# Patient Record
Sex: FEMALE | Race: WHITE | NOT HISPANIC OR LATINO | Employment: OTHER | URBAN - METROPOLITAN AREA
[De-identification: names, ages, dates, MRNs, and addresses within clinical notes are randomized per-mention and may not be internally consistent; named-entity substitution may affect disease eponyms.]

---

## 2021-05-25 ENCOUNTER — RECORDS - HEALTHEAST (OUTPATIENT)
Dept: ADMINISTRATIVE | Facility: CLINIC | Age: 86
End: 2021-05-25

## 2021-05-26 ENCOUNTER — RECORDS - HEALTHEAST (OUTPATIENT)
Dept: ADMINISTRATIVE | Facility: CLINIC | Age: 86
End: 2021-05-26

## 2021-05-27 ENCOUNTER — RECORDS - HEALTHEAST (OUTPATIENT)
Dept: ADMINISTRATIVE | Facility: CLINIC | Age: 86
End: 2021-05-27

## 2021-05-31 ENCOUNTER — RECORDS - HEALTHEAST (OUTPATIENT)
Dept: ADMINISTRATIVE | Facility: CLINIC | Age: 86
End: 2021-05-31

## 2021-06-01 ENCOUNTER — RECORDS - HEALTHEAST (OUTPATIENT)
Dept: ADMINISTRATIVE | Facility: CLINIC | Age: 86
End: 2021-06-01

## 2021-06-02 ENCOUNTER — RECORDS - HEALTHEAST (OUTPATIENT)
Dept: ADMINISTRATIVE | Facility: CLINIC | Age: 86
End: 2021-06-02

## 2021-07-13 ENCOUNTER — RECORDS - HEALTHEAST (OUTPATIENT)
Dept: ADMINISTRATIVE | Facility: CLINIC | Age: 86
End: 2021-07-13

## 2021-07-14 ENCOUNTER — RECORDS - HEALTHEAST (OUTPATIENT)
Dept: ADMINISTRATIVE | Facility: CLINIC | Age: 86
End: 2021-07-14

## 2021-07-23 ENCOUNTER — RECORDS - HEALTHEAST (OUTPATIENT)
Dept: ADMINISTRATIVE | Facility: CLINIC | Age: 86
End: 2021-07-23

## 2022-03-03 ENCOUNTER — HOSPITAL ENCOUNTER (OUTPATIENT)
Dept: MRI IMAGING | Facility: CLINIC | Age: 87
Discharge: HOME | End: 2022-03-03
Attending: PHYSICIAN ASSISTANT | Admitting: PHYSICIAN ASSISTANT
Payer: COMMERCIAL

## 2022-03-03 DIAGNOSIS — R93.89 ABNORMAL CT SCAN: ICD-10-CM

## 2022-03-03 PROCEDURE — A9585 GADOBUTROL INJECTION: HCPCS

## 2022-03-03 PROCEDURE — 250N000011 HC RX IP 250 OP 636: Performed by: RADIOLOGY

## 2022-03-03 PROCEDURE — 72197 MRI PELVIS W/O & W/DYE: CPT

## 2022-03-03 PROCEDURE — 74183 MRI ABD W/O CNTR FLWD CNTR: CPT

## 2022-03-03 PROCEDURE — 255N000002 HC RX 255 OP 636

## 2022-03-03 RX ORDER — GADOBUTROL 604.72 MG/ML
7.5 INJECTION INTRAVENOUS ONCE
Status: COMPLETED | OUTPATIENT
Start: 2022-03-03 | End: 2022-03-03

## 2022-03-03 RX ADMIN — GLUCAGON HYDROCHLORIDE 0.5 MG: KIT at 08:48

## 2022-03-03 RX ADMIN — GLUCAGON HYDROCHLORIDE 0.5 MG: KIT at 09:21

## 2022-03-03 RX ADMIN — GADOBUTROL 7.5 ML: 604.72 INJECTION INTRAVENOUS at 09:25

## 2022-07-06 ENCOUNTER — ANESTHESIA EVENT (OUTPATIENT)
Dept: SURGERY | Facility: HOSPITAL | Age: 87
End: 2022-07-06
Payer: COMMERCIAL

## 2022-07-06 RX ORDER — AMLODIPINE BESYLATE 10 MG/1
10 TABLET ORAL DAILY
COMMUNITY

## 2022-07-06 RX ORDER — MONTELUKAST SODIUM 4 MG/1
1 TABLET, CHEWABLE ORAL 2 TIMES DAILY
COMMUNITY

## 2022-07-06 ASSESSMENT — LIFESTYLE VARIABLES: TOBACCO_USE: 1

## 2022-07-06 ASSESSMENT — ENCOUNTER SYMPTOMS: SEIZURES: 1

## 2022-07-07 ENCOUNTER — HOSPITAL ENCOUNTER (OUTPATIENT)
Facility: HOSPITAL | Age: 87
Discharge: HOME | End: 2022-07-07
Attending: INTERNAL MEDICINE | Admitting: INTERNAL MEDICINE
Payer: COMMERCIAL

## 2022-07-07 ENCOUNTER — ANESTHESIA (OUTPATIENT)
Dept: SURGERY | Facility: HOSPITAL | Age: 87
End: 2022-07-07
Payer: COMMERCIAL

## 2022-07-07 VITALS
TEMPERATURE: 98 F | OXYGEN SATURATION: 93 % | SYSTOLIC BLOOD PRESSURE: 146 MMHG | DIASTOLIC BLOOD PRESSURE: 103 MMHG | WEIGHT: 160.6 LBS | RESPIRATION RATE: 18 BRPM | HEART RATE: 63 BPM

## 2022-07-07 LAB
COLONOSCOPY: NORMAL
UPPER GI ENDOSCOPY: NORMAL

## 2022-07-07 PROCEDURE — 272N000001 HC OR GENERAL SUPPLY STERILE: Performed by: INTERNAL MEDICINE

## 2022-07-07 PROCEDURE — 258N000003 HC RX IP 258 OP 636: Performed by: ANESTHESIOLOGY

## 2022-07-07 PROCEDURE — 710N000012 HC RECOVERY PHASE 2, PER MINUTE: Performed by: INTERNAL MEDICINE

## 2022-07-07 PROCEDURE — 250N000011 HC RX IP 250 OP 636: Performed by: NURSE ANESTHETIST, CERTIFIED REGISTERED

## 2022-07-07 PROCEDURE — 250N000009 HC RX 250: Performed by: NURSE ANESTHETIST, CERTIFIED REGISTERED

## 2022-07-07 PROCEDURE — 250N000009 HC RX 250: Performed by: INTERNAL MEDICINE

## 2022-07-07 PROCEDURE — 88305 TISSUE EXAM BY PATHOLOGIST: CPT | Mod: TC | Performed by: INTERNAL MEDICINE

## 2022-07-07 PROCEDURE — 370N000017 HC ANESTHESIA TECHNICAL FEE, PER MIN: Performed by: INTERNAL MEDICINE

## 2022-07-07 PROCEDURE — 360N000075 HC SURGERY LEVEL 2, PER MIN: Performed by: INTERNAL MEDICINE

## 2022-07-07 PROCEDURE — 999N000141 HC STATISTIC PRE-PROCEDURE NURSING ASSESSMENT: Performed by: INTERNAL MEDICINE

## 2022-07-07 RX ORDER — ONDANSETRON 2 MG/ML
4 INJECTION INTRAMUSCULAR; INTRAVENOUS EVERY 30 MIN PRN
Status: DISCONTINUED | OUTPATIENT
Start: 2022-07-07 | End: 2022-07-07 | Stop reason: HOSPADM

## 2022-07-07 RX ORDER — NALOXONE HYDROCHLORIDE 1 MG/ML
0.2 INJECTION INTRAMUSCULAR; INTRAVENOUS; SUBCUTANEOUS
Status: DISCONTINUED | OUTPATIENT
Start: 2022-07-07 | End: 2022-07-07 | Stop reason: HOSPADM

## 2022-07-07 RX ORDER — NALOXONE HYDROCHLORIDE 1 MG/ML
0.4 INJECTION INTRAMUSCULAR; INTRAVENOUS; SUBCUTANEOUS
Status: DISCONTINUED | OUTPATIENT
Start: 2022-07-07 | End: 2022-07-07 | Stop reason: HOSPADM

## 2022-07-07 RX ORDER — FENTANYL CITRATE 50 UG/ML
50 INJECTION, SOLUTION INTRAMUSCULAR; INTRAVENOUS EVERY 5 MIN PRN
Status: DISCONTINUED | OUTPATIENT
Start: 2022-07-07 | End: 2022-07-07 | Stop reason: HOSPADM

## 2022-07-07 RX ORDER — FENTANYL CITRATE 50 UG/ML
50 INJECTION, SOLUTION INTRAMUSCULAR; INTRAVENOUS
Status: DISCONTINUED | OUTPATIENT
Start: 2022-07-07 | End: 2022-07-07 | Stop reason: HOSPADM

## 2022-07-07 RX ORDER — ONDANSETRON 4 MG/1
4 TABLET, ORALLY DISINTEGRATING ORAL EVERY 30 MIN PRN
Status: DISCONTINUED | OUTPATIENT
Start: 2022-07-07 | End: 2022-07-07 | Stop reason: HOSPADM

## 2022-07-07 RX ORDER — OXYCODONE HYDROCHLORIDE 5 MG/1
5 TABLET ORAL EVERY 4 HOURS PRN
Status: DISCONTINUED | OUTPATIENT
Start: 2022-07-07 | End: 2022-07-07 | Stop reason: HOSPADM

## 2022-07-07 RX ORDER — HYDROMORPHONE HYDROCHLORIDE 1 MG/ML
0.4 INJECTION, SOLUTION INTRAMUSCULAR; INTRAVENOUS; SUBCUTANEOUS EVERY 5 MIN PRN
Status: DISCONTINUED | OUTPATIENT
Start: 2022-07-07 | End: 2022-07-07 | Stop reason: HOSPADM

## 2022-07-07 RX ORDER — PROPOFOL 10 MG/ML
INJECTION, EMULSION INTRAVENOUS PRN
Status: DISCONTINUED | OUTPATIENT
Start: 2022-07-07 | End: 2022-07-07

## 2022-07-07 RX ORDER — LIDOCAINE HYDROCHLORIDE 10 MG/ML
INJECTION, SOLUTION INFILTRATION; PERINEURAL PRN
Status: DISCONTINUED | OUTPATIENT
Start: 2022-07-07 | End: 2022-07-07

## 2022-07-07 RX ORDER — SODIUM CHLORIDE, SODIUM LACTATE, POTASSIUM CHLORIDE, CALCIUM CHLORIDE 600; 310; 30; 20 MG/100ML; MG/100ML; MG/100ML; MG/100ML
INJECTION, SOLUTION INTRAVENOUS CONTINUOUS
Status: DISCONTINUED | OUTPATIENT
Start: 2022-07-07 | End: 2022-07-07 | Stop reason: HOSPADM

## 2022-07-07 RX ORDER — MEPERIDINE HYDROCHLORIDE 25 MG/ML
12.5 INJECTION INTRAMUSCULAR; INTRAVENOUS; SUBCUTANEOUS
Status: DISCONTINUED | OUTPATIENT
Start: 2022-07-07 | End: 2022-07-07 | Stop reason: HOSPADM

## 2022-07-07 RX ORDER — PROPOFOL 10 MG/ML
INJECTION, EMULSION INTRAVENOUS CONTINUOUS PRN
Status: DISCONTINUED | OUTPATIENT
Start: 2022-07-07 | End: 2022-07-07

## 2022-07-07 RX ORDER — LIDOCAINE 40 MG/G
CREAM TOPICAL
Status: DISCONTINUED | OUTPATIENT
Start: 2022-07-07 | End: 2022-07-07 | Stop reason: HOSPADM

## 2022-07-07 RX ORDER — HALOPERIDOL 5 MG/ML
1 INJECTION INTRAMUSCULAR
Status: DISCONTINUED | OUTPATIENT
Start: 2022-07-07 | End: 2022-07-07 | Stop reason: HOSPADM

## 2022-07-07 RX ADMIN — PROPOFOL 150 MCG/KG/MIN: 10 INJECTION, EMULSION INTRAVENOUS at 09:21

## 2022-07-07 RX ADMIN — PROPOFOL 30 MG: 10 INJECTION, EMULSION INTRAVENOUS at 09:25

## 2022-07-07 RX ADMIN — LIDOCAINE HYDROCHLORIDE 3 ML: 10 INJECTION, SOLUTION INFILTRATION; PERINEURAL at 09:19

## 2022-07-07 RX ADMIN — PROPOFOL 50 MG: 10 INJECTION, EMULSION INTRAVENOUS at 09:23

## 2022-07-07 RX ADMIN — SODIUM CHLORIDE, POTASSIUM CHLORIDE, SODIUM LACTATE AND CALCIUM CHLORIDE: 600; 310; 30; 20 INJECTION, SOLUTION INTRAVENOUS at 07:06

## 2022-07-07 RX ADMIN — SODIUM CHLORIDE, POTASSIUM CHLORIDE, SODIUM LACTATE AND CALCIUM CHLORIDE: 600; 310; 30; 20 INJECTION, SOLUTION INTRAVENOUS at 09:18

## 2022-07-07 NOTE — DISCHARGE INSTRUCTIONS
Discharge Instructions: After Your Surgery  You ve just had surgery. During surgery, you were given medicine called anesthesia to keep you relaxed and free of pain. After surgery, you may have some pain or nausea. This is common. Here are some tips for feeling better and getting well after surgery.     Stay on schedule with your medicine.   Going home  Your healthcare provider will show you how to take care of yourself when you go home. He or she will also answer your questions. Have an adult family member or friend drive you home. For the first 24 hours after your surgery:  Don't drive or use heavy equipment.  Don't make important decisions or sign legal papers.  Don't drink alcohol.  Have someone stay with you, if needed. He or she can watch for problems and help keep you safe.  Be sure to go to all follow-up visits with your healthcare provider. And rest after your surgery for as long as your healthcare provider tells you to.  Coping with pain  If you have pain after surgery, pain medicine will help you feel better. Take it as told, before pain becomes severe. Also, ask your healthcare provider or pharmacist about other ways to control pain. This might be with heat, ice, or relaxation. And follow any other instructions your surgeon or nurse gives you.  Tips for taking pain medicine  To get the best relief possible, remember these points:  Pain medicines can upset your stomach. Taking them with a little food may help.  Most pain relievers taken by mouth need at least 20 to 30 minutes to start to work.  Don't wait till your pain becomes severe before you take your medicine. Try to time your medicine so that you can take it before starting an activity. This might be before you get dressed, go for a walk, or sit down for dinner.  Constipation is a common side effect of pain medicines. Call your healthcare provider before taking any medicines such as laxatives or stool softeners to help ease constipation. Also ask  if you should skip any foods. Drinking lots of fluids and eating foods such as fruits and vegetables that are high in fiber can also help. Remember, don't take laxatives unless your surgeon has prescribed them.  Drinking alcohol and taking pain medicine can cause dizziness and slow your breathing. It can even be deadly. Don't drink alcohol while taking pain medicine.  Pain medicine can make you react more slowly to things. Don't drive or run machinery while taking pain medicine.  Your healthcare provider may tell you to take acetaminophen to help ease your pain. Ask him or her how much you are supposed to take each day. Acetaminophen or other pain relievers may interact with your prescription medicines or other over-the-counter (OTC) medicines. Some prescription medicines have acetaminophen and other ingredients. Using both prescription and OTC acetaminophen for pain can cause you to overdose. Read the labels on your OTC medicines with care. This will help you to clearly know the list of ingredients, how much to take, and any warnings. It may also help you not take too much acetaminophen. If you have questions or don't understand the information, ask your pharmacist or healthcare provider to explain it to you before you take the OTC medicine.  Managing nausea  Some people have an upset stomach after surgery. This is often because of anesthesia, pain, or pain medicine, or the stress of surgery. These tips will help you handle nausea and eat healthy foods as you get better. If you were on a special food plan before surgery, ask your healthcare provider if you should follow it while you get better. These tips may help:  Don't push yourself to eat. Your body will tell you when to eat and how much.  Start off with clear liquids and soup. They are easier to digest.  Next try semi-solid foods, such as mashed potatoes, applesauce, and gelatin, as you feel ready.  Slowly move to solid foods. Don t eat fatty, rich, or spicy  foods at first.  Don't force yourself to have 3 large meals a day. Instead eat smaller amounts more often.  Take pain medicines with a small amount of solid food, such as crackers or toast, to prevent nausea.  When to call your healthcare provider  Call your healthcare provider if:  You still have intolerable pain an hour after taking medicine. The medicine may not be strong enough.  You feel too sleepy, dizzy, or groggy. The medicine may be too strong.  You have side effects such as nausea or vomiting, or skin changes such as rash, itching, or hives. Your healthcare provider may suggest other medicines to control side effects.  Rash, itching, or hives may mean you have an allergic reaction. Report this right away. If you have trouble breathing or facial swelling, call 911 right away.  If you have obstructive sleep apnea  You were given anesthesia medicine during surgery to keep you comfortable and free of pain. After surgery, you may have more apnea spells because of this medicine and other medicines you were given. The spells may last longer than usual.   At home:  Keep using the continuous positive airway pressure (CPAP) device when you sleep. Unless your healthcare provider tells you not to, use it when you sleep, day or night. CPAP is a common device used to treat obstructive sleep apnea.  Talk with your provider before taking any pain medicine, muscle relaxants, or sedatives. Your provider will tell you about the possible dangers of taking these medicines.  [employer] last reviewed this educational content on 3/1/2019    4636-8820 [employer]. All rights reserved. This information is not intended as a substitute for professional medical care. Always follow your healthcare professional's instructions.

## 2022-07-07 NOTE — ANESTHESIA POSTPROCEDURE EVALUATION
Patient: Chavez Tavares    Procedure: Procedure(s):  ESOPHAGOGASTRODUODENOSCOPY WITH  COLONOSCOPY       Anesthesia Type:  MAC    Note:  Disposition: Inpatient   Postop Pain Control: Uneventful            Sign Out: Well controlled pain   PONV: No   Neuro/Psych: Uneventful            Sign Out: Acceptable/Baseline neuro status   Airway/Respiratory: Uneventful            Sign Out: Acceptable/Baseline resp. status   CV/Hemodynamics: Uneventful            Sign Out: Acceptable CV status; No obvious hypovolemia; No obvious fluid overload   Other NRE: NONE   DID A NON-ROUTINE EVENT OCCUR? No           Last vitals:  Vitals Value Taken Time   /77 07/07/22 1047   Temp 36.7  C (98  F) 07/07/22 1045   Pulse 63 07/07/22 1050   Resp 18 07/07/22 1004   SpO2 95 % 07/07/22 1050   Vitals shown include unvalidated device data.    Electronically Signed By: Maria Ines Rice MD  July 7, 2022  3:28 PM

## 2022-07-07 NOTE — ANESTHESIA PREPROCEDURE EVALUATION
Anesthesia Pre-Procedure Evaluation    Patient: Chavez Tavares   MRN: [id number] : 1931        Procedure : Procedure(s):  ESOPHAGOGASTRODUODENOSCOPY WITH  COLONOSCOPY          Past Medical History:   Diagnosis Date     Chronic colitis      Epilepsy (H)      Hypertension      Mixed hyperlipidemia       Past Surgical History:   Procedure Laterality Date     ENT SURGERY       LUMPECTOMY BREAST Right 2005      Allergies   Allergen Reactions     Lisinopril Cough     Simvastatin Muscle Pain (Myalgia)     Carbamazepine Rash      Social History     Tobacco Use     Smoking status: Former Smoker     Quit date: 2000     Years since quittin.5     Smokeless tobacco: Not on file   Substance Use Topics     Alcohol use: Not on file      Wt Readings from Last 1 Encounters:   No data found for Wt        Anesthesia Evaluation   Pt has had prior anesthetic.     No history of anesthetic complications       ROS/MED HX  ENT/Pulmonary:     (+) tobacco use, Past use,     Neurologic:     (+) seizures (Epilepsy),     Cardiovascular:     (+) Dyslipidemia hypertension-----    METS/Exercise Tolerance: >4 METS    Hematologic: Comments: thrombocytopenia      Musculoskeletal:  - neg musculoskeletal ROS     GI/Hepatic:  - neg GI/hepatic ROS     Renal/Genitourinary:  - neg Renal ROS     Endo:  - neg endo ROS     Psychiatric/Substance Use:       Infectious Disease:  - neg infectious disease ROS     Malignancy:       Other:            Physical Exam    Airway        Mallampati: III   TM distance: > 3 FB   Neck ROM: full   Mouth opening: > 3 cm    Respiratory Devices and Support         Dental         B=Bridge, C=Chipped, L=Loose, M=Missing    Cardiovascular   cardiovascular exam normal          Pulmonary   pulmonary exam normal                OUTSIDE LABS:  CBC: No results found for: WBC, HGB, HCT, PLT  BMP: No results found for: NA, POTASSIUM, CHLORIDE, CO2, BUN, CR, GLC  COAGS: No results found for: PTT, INR, FIBR  POC: No  results found for: BGM, HCG, HCGS  HEPATIC: No results found for: ALBUMIN, PROTTOTAL, ALT, AST, GGT, ALKPHOS, BILITOTAL, BILIDIRECT, NICK  OTHER: No results found for: PH, LACT, A1C, SONG, PHOS, MAG, LIPASE, AMYLASE, TSH, T4, T3, CRP, SED    Anesthesia Plan    ASA Status:  2   NPO Status:  NPO Appropriate    Anesthesia Type: MAC.              Consents    Anesthesia Plan(s) and associated risks, benefits, and realistic alternatives discussed. Questions answered and patient/representative(s) expressed understanding.     - Discussed: Risks, Benefits and Alternatives for BOTH SEDATION and the PROCEDURE were discussed     - Discussed with:  Patient      - Extended Intubation/Ventilatory Support Discussed: No.      - Patient is DNR/DNI Status: No         Postoperative Care       PONV prophylaxis: Ondansetron (or other 5HT-3), Dexamethasone or Solumedrol     Comments:    Other Comments: Propofol sedation.            Maria Ines Rice MD

## 2022-07-08 LAB
PATH REPORT.COMMENTS IMP SPEC: NORMAL
PATH REPORT.COMMENTS IMP SPEC: NORMAL
PATH REPORT.FINAL DX SPEC: NORMAL
PATH REPORT.GROSS SPEC: NORMAL
PATH REPORT.MICROSCOPIC SPEC OTHER STN: NORMAL
PATH REPORT.RELEVANT HX SPEC: NORMAL
PHOTO IMAGE: NORMAL

## 2022-07-08 PROCEDURE — 88305 TISSUE EXAM BY PATHOLOGIST: CPT | Mod: 26 | Performed by: PATHOLOGY

## 2024-11-01 ENCOUNTER — APPOINTMENT (OUTPATIENT)
Dept: ULTRASOUND IMAGING | Facility: HOSPITAL | Age: 89
End: 2024-11-01
Attending: EMERGENCY MEDICINE
Payer: COMMERCIAL

## 2024-11-01 ENCOUNTER — APPOINTMENT (OUTPATIENT)
Dept: CT IMAGING | Facility: HOSPITAL | Age: 89
End: 2024-11-01
Attending: EMERGENCY MEDICINE
Payer: COMMERCIAL

## 2024-11-01 ENCOUNTER — APPOINTMENT (OUTPATIENT)
Dept: CT IMAGING | Facility: HOSPITAL | Age: 89
End: 2024-11-01
Attending: INTERNAL MEDICINE
Payer: COMMERCIAL

## 2024-11-01 ENCOUNTER — HOSPITAL ENCOUNTER (OUTPATIENT)
Facility: HOSPITAL | Age: 89
Setting detail: OBSERVATION
Discharge: HOME | End: 2024-11-02
Attending: EMERGENCY MEDICINE | Admitting: EMERGENCY MEDICINE
Payer: COMMERCIAL

## 2024-11-01 DIAGNOSIS — R55 NEAR SYNCOPE: Primary | ICD-10-CM

## 2024-11-01 DIAGNOSIS — R55 SYNCOPE, UNSPECIFIED SYNCOPE TYPE: ICD-10-CM

## 2024-11-01 DIAGNOSIS — I10 HYPERTENSION, UNSPECIFIED TYPE: ICD-10-CM

## 2024-11-01 LAB
ALBUMIN SERPL BCG-MCNC: 4.1 G/DL (ref 3.5–5.2)
ALP SERPL-CCNC: 88 U/L (ref 40–150)
ALT SERPL W P-5'-P-CCNC: 18 U/L (ref 0–50)
ANION GAP SERPL CALCULATED.3IONS-SCNC: 12 MMOL/L (ref 7–15)
AST SERPL W P-5'-P-CCNC: 25 U/L (ref 0–45)
BASOPHILS # BLD AUTO: 0.1 10E3/UL (ref 0–0.2)
BASOPHILS NFR BLD AUTO: 1 %
BILIRUB DIRECT SERPL-MCNC: <0.2 MG/DL (ref 0–0.3)
BILIRUB SERPL-MCNC: 0.3 MG/DL
BUN SERPL-MCNC: 24 MG/DL (ref 8–23)
CALCIUM SERPL-MCNC: 8.7 MG/DL (ref 8.8–10.4)
CHLORIDE SERPL-SCNC: 101 MMOL/L (ref 98–107)
CREAT SERPL-MCNC: 0.99 MG/DL (ref 0.51–0.95)
D DIMER PPP FEU-MCNC: 1.07 UG/ML FEU (ref 0–0.5)
EGFRCR SERPLBLD CKD-EPI 2021: 53 ML/MIN/1.73M2
EOSINOPHIL # BLD AUTO: 0.1 10E3/UL (ref 0–0.7)
EOSINOPHIL NFR BLD AUTO: 2 %
ERYTHROCYTE [DISTWIDTH] IN BLOOD BY AUTOMATED COUNT: 13.2 % (ref 10–15)
GLUCOSE SERPL-MCNC: 133 MG/DL (ref 70–99)
HCO3 SERPL-SCNC: 25 MMOL/L (ref 22–29)
HCT VFR BLD AUTO: 38.5 % (ref 35–47)
HGB BLD-MCNC: 12.9 G/DL (ref 11.7–15.7)
HOLD SPECIMEN: NORMAL
IMM GRANULOCYTES # BLD: 0 10E3/UL
IMM GRANULOCYTES NFR BLD: 0 %
LACTATE SERPL-SCNC: 0.9 MMOL/L (ref 0.7–2)
LYMPHOCYTES # BLD AUTO: 1.2 10E3/UL (ref 0.8–5.3)
LYMPHOCYTES NFR BLD AUTO: 20 %
MCH RBC QN AUTO: 31.6 PG (ref 26.5–33)
MCHC RBC AUTO-ENTMCNC: 33.5 G/DL (ref 31.5–36.5)
MCV RBC AUTO: 94 FL (ref 78–100)
MONOCYTES # BLD AUTO: 0.5 10E3/UL (ref 0–1.3)
MONOCYTES NFR BLD AUTO: 9 %
NEUTROPHILS # BLD AUTO: 4 10E3/UL (ref 1.6–8.3)
NEUTROPHILS NFR BLD AUTO: 68 %
NRBC # BLD AUTO: 0 10E3/UL
NRBC BLD AUTO-RTO: 0 /100
PHENYTOIN SERPL-MCNC: 1.4 UG/ML
PLATELET # BLD AUTO: 114 10E3/UL (ref 150–450)
POTASSIUM SERPL-SCNC: 3.8 MMOL/L (ref 3.4–5.3)
PROT SERPL-MCNC: 6.8 G/DL (ref 6.4–8.3)
RBC # BLD AUTO: 4.08 10E6/UL (ref 3.8–5.2)
SODIUM SERPL-SCNC: 138 MMOL/L (ref 135–145)
TROPONIN T SERPL HS-MCNC: 18 NG/L
TROPONIN T SERPL HS-MCNC: 19 NG/L
WBC # BLD AUTO: 5.9 10E3/UL (ref 4–11)

## 2024-11-01 PROCEDURE — 82248 BILIRUBIN DIRECT: CPT | Performed by: EMERGENCY MEDICINE

## 2024-11-01 PROCEDURE — 250N000011 HC RX IP 250 OP 636: Performed by: EMERGENCY MEDICINE

## 2024-11-01 PROCEDURE — 99285 EMERGENCY DEPT VISIT HI MDM: CPT | Mod: 25

## 2024-11-01 PROCEDURE — 80185 ASSAY OF PHENYTOIN TOTAL: CPT | Performed by: EMERGENCY MEDICINE

## 2024-11-01 PROCEDURE — 85379 FIBRIN DEGRADATION QUANT: CPT | Performed by: EMERGENCY MEDICINE

## 2024-11-01 PROCEDURE — 84484 ASSAY OF TROPONIN QUANT: CPT | Performed by: EMERGENCY MEDICINE

## 2024-11-01 PROCEDURE — G0378 HOSPITAL OBSERVATION PER HR: HCPCS

## 2024-11-01 PROCEDURE — 71275 CT ANGIOGRAPHY CHEST: CPT

## 2024-11-01 PROCEDURE — 36415 COLL VENOUS BLD VENIPUNCTURE: CPT | Performed by: EMERGENCY MEDICINE

## 2024-11-01 PROCEDURE — 93971 EXTREMITY STUDY: CPT | Mod: LT

## 2024-11-01 PROCEDURE — 80048 BASIC METABOLIC PNL TOTAL CA: CPT | Performed by: EMERGENCY MEDICINE

## 2024-11-01 PROCEDURE — 99222 1ST HOSP IP/OBS MODERATE 55: CPT | Performed by: INTERNAL MEDICINE

## 2024-11-01 PROCEDURE — 85004 AUTOMATED DIFF WBC COUNT: CPT | Performed by: EMERGENCY MEDICINE

## 2024-11-01 PROCEDURE — 93005 ELECTROCARDIOGRAM TRACING: CPT | Performed by: STUDENT IN AN ORGANIZED HEALTH CARE EDUCATION/TRAINING PROGRAM

## 2024-11-01 PROCEDURE — 70450 CT HEAD/BRAIN W/O DYE: CPT

## 2024-11-01 PROCEDURE — 36415 COLL VENOUS BLD VENIPUNCTURE: CPT | Performed by: STUDENT IN AN ORGANIZED HEALTH CARE EDUCATION/TRAINING PROGRAM

## 2024-11-01 PROCEDURE — 83605 ASSAY OF LACTIC ACID: CPT | Performed by: EMERGENCY MEDICINE

## 2024-11-01 RX ORDER — AMOXICILLIN 250 MG
1 CAPSULE ORAL 2 TIMES DAILY PRN
Status: DISCONTINUED | OUTPATIENT
Start: 2024-11-01 | End: 2024-11-02 | Stop reason: HOSPADM

## 2024-11-01 RX ORDER — ONDANSETRON 4 MG/1
4 TABLET, ORALLY DISINTEGRATING ORAL EVERY 6 HOURS PRN
Status: DISCONTINUED | OUTPATIENT
Start: 2024-11-01 | End: 2024-11-02 | Stop reason: HOSPADM

## 2024-11-01 RX ORDER — LOSARTAN POTASSIUM 100 MG/1
100 TABLET ORAL DAILY
COMMUNITY
Start: 2024-09-13

## 2024-11-01 RX ORDER — TRAZODONE HYDROCHLORIDE 50 MG/1
50 TABLET, FILM COATED ORAL AT BEDTIME
Status: DISCONTINUED | OUTPATIENT
Start: 2024-11-02 | End: 2024-11-02 | Stop reason: HOSPADM

## 2024-11-01 RX ORDER — PHENYTOIN SODIUM 100 MG/1
100 CAPSULE, EXTENDED RELEASE ORAL DAILY
Status: DISCONTINUED | OUTPATIENT
Start: 2024-11-02 | End: 2024-11-01

## 2024-11-01 RX ORDER — CALCIUM CARBONATE 500 MG/1
1 TABLET, CHEWABLE ORAL PRN
COMMUNITY

## 2024-11-01 RX ORDER — ONDANSETRON 2 MG/ML
4 INJECTION INTRAMUSCULAR; INTRAVENOUS EVERY 6 HOURS PRN
Status: DISCONTINUED | OUTPATIENT
Start: 2024-11-01 | End: 2024-11-02 | Stop reason: HOSPADM

## 2024-11-01 RX ORDER — TRAZODONE HYDROCHLORIDE 50 MG/1
1 TABLET, FILM COATED ORAL AT BEDTIME
COMMUNITY
Start: 2024-09-13

## 2024-11-01 RX ORDER — ACETAMINOPHEN 650 MG/1
650 SUPPOSITORY RECTAL EVERY 4 HOURS PRN
Status: DISCONTINUED | OUTPATIENT
Start: 2024-11-01 | End: 2024-11-02 | Stop reason: HOSPADM

## 2024-11-01 RX ORDER — ACETAMINOPHEN 325 MG/1
650 TABLET ORAL EVERY 4 HOURS PRN
Status: DISCONTINUED | OUTPATIENT
Start: 2024-11-01 | End: 2024-11-02 | Stop reason: HOSPADM

## 2024-11-01 RX ORDER — PHENYTOIN SODIUM 100 MG/1
100 CAPSULE, EXTENDED RELEASE ORAL DAILY
Status: DISCONTINUED | OUTPATIENT
Start: 2024-11-02 | End: 2024-11-02 | Stop reason: HOSPADM

## 2024-11-01 RX ORDER — AMOXICILLIN 250 MG
2 CAPSULE ORAL 2 TIMES DAILY PRN
Status: DISCONTINUED | OUTPATIENT
Start: 2024-11-01 | End: 2024-11-02 | Stop reason: HOSPADM

## 2024-11-01 RX ORDER — HYDROCHLOROTHIAZIDE 25 MG/1
25 TABLET ORAL DAILY
Status: DISCONTINUED | OUTPATIENT
Start: 2024-11-02 | End: 2024-11-02 | Stop reason: HOSPADM

## 2024-11-01 RX ORDER — LOSARTAN POTASSIUM 50 MG/1
100 TABLET ORAL DAILY
Status: DISCONTINUED | OUTPATIENT
Start: 2024-11-02 | End: 2024-11-02 | Stop reason: HOSPADM

## 2024-11-01 RX ORDER — AMLODIPINE BESYLATE 5 MG/1
10 TABLET ORAL DAILY
Status: DISCONTINUED | OUTPATIENT
Start: 2024-11-02 | End: 2024-11-02 | Stop reason: HOSPADM

## 2024-11-01 RX ORDER — MONTELUKAST SODIUM 4 MG/1
1 TABLET, CHEWABLE ORAL DAILY
Status: DISCONTINUED | OUTPATIENT
Start: 2024-11-02 | End: 2024-11-02 | Stop reason: HOSPADM

## 2024-11-01 RX ORDER — PRAVASTATIN SODIUM 20 MG
40 TABLET ORAL AT BEDTIME
Status: DISCONTINUED | OUTPATIENT
Start: 2024-11-02 | End: 2024-11-02 | Stop reason: HOSPADM

## 2024-11-01 RX ORDER — IOPAMIDOL 755 MG/ML
75 INJECTION, SOLUTION INTRAVASCULAR ONCE
Status: COMPLETED | OUTPATIENT
Start: 2024-11-01 | End: 2024-11-01

## 2024-11-01 RX ORDER — LORAZEPAM 2 MG/ML
1 INJECTION INTRAMUSCULAR
Status: DISCONTINUED | OUTPATIENT
Start: 2024-11-01 | End: 2024-11-02 | Stop reason: HOSPADM

## 2024-11-01 RX ORDER — CALCIUM CARBONATE 500 MG/1
1000 TABLET, CHEWABLE ORAL 4 TIMES DAILY PRN
Status: DISCONTINUED | OUTPATIENT
Start: 2024-11-01 | End: 2024-11-02 | Stop reason: HOSPADM

## 2024-11-01 RX ORDER — LIDOCAINE 40 MG/G
CREAM TOPICAL
Status: DISCONTINUED | OUTPATIENT
Start: 2024-11-01 | End: 2024-11-02 | Stop reason: HOSPADM

## 2024-11-01 RX ADMIN — IOPAMIDOL 75 ML: 755 INJECTION, SOLUTION INTRAVENOUS at 20:11

## 2024-11-01 ASSESSMENT — ACTIVITIES OF DAILY LIVING (ADL)
ADLS_ACUITY_SCORE: 0

## 2024-11-01 NOTE — ED TRIAGE NOTES
Pt here with possible syncopal episode. Pt reports she remembers everything, DTR reports that it lasted 15-30 seconds. Pt reports having diarrhea after this. 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 Pt denies blood in stool, no problems with urination. VSS> pt also voted today. EMS SR with PAC's, and possible L BB> . Denies c/p, no other falls, no thinners. Pain behind left leg 1/0. VSS. Alert and oriented. Able to make needs known.     Triage Assessment (Adult)       Row Name 11/01/24 1826          Triage Assessment    Airway WDL WDL        Respiratory WDL    Respiratory WDL WDL        Skin Circulation/Temperature WDL    Skin Circulation/Temperature WDL WDL        Cardiac WDL    Cardiac WDL X  pt denies c/p EMS report pt was having SR with frequent PACs and possible L BBB        Peripheral/Neurovascular WDL    Peripheral Neurovascular WDL WDL        Cognitive/Neuro/Behavioral WDL    Cognitive/Neuro/Behavioral WDL WDL        Riley Coma Scale    Best Eye Response 4-->(E4) spontaneous     Best Motor Response 6-->(M6) obeys commands     Best Verbal Response 5-->(V5) oriented     Simba Coma Scale Score 15

## 2024-11-01 NOTE — ED NOTES
Bed: JNED-19  Expected date: 11/1/24  Expected time: 6:07 PM  Means of arrival: Ambulance  Comments:  WBL  89F  Syncope

## 2024-11-01 NOTE — ED PROVIDER NOTES
"EMERGENCY DEPARTMENT NOTE     Name: Chavez Tavares    Age/Sex: 89 year old female   MRN: [id number]   Evaluation Date & Time:  11/1/2024  6:16 PM    PCP:    No Ref-Primary, Physician   ED Provider: Janusz Sanchez D.O.       CHIEF COMPLAINT    Syncope     HISTORY OF PRESENT ILLNESS   Chavez Tavares is a 89 year old year old female with a relevant past history of hypertension, hyperlipidemia, remote seizure history, who presents to the ED  for evaluation of syncope.  Patient was in her usual state of health and had been out with her daughters today shopping and was in usual state of health without any complaint including dizziness, chest pain or shortness of breath.  They had returned to the patient's home and the patient had been seated for period of time and daughter was giving her headache when she was noted to slump to the left with loss of consciousness.  No seizure-like activity noted.  Patient was unresponsive for approximately 1 minute and then awoke and became alert.  She had rectal urgency and had an episode of diarrhea that was nonbloody and was not reported to be melena.  No associated abdominal pain.  Patient remembers most of the events for the episode and stated that prior to it she can feel \"something was coming on\".  No subsequent chest pain or shortness of breath.  2 weeks ago she was evaluated at the [facility] for left leg pain which is still present but improving, ultrasound at that time negative for DVT.  Patient other review of systems denies headache, fever, URI symptoms, neck pain or stiffness.  No chest pain, shortness of breath, palpitations, abdominal pain or urinary tract symptoms and all other review of systems negative    DIAGNOSIS & DISPOSITION/MEDICAL DECISION MAKING     1. Syncope, unspecified syncope type        EMERGENCY DEPARTMENT COURSE   6:39 PM I met with the patient to gather history and to perform my initial exam.  We discussed treatment options and the plan for care while " in the Emergency Department.  Triage vital signs:  Differential diagnosis considered included but not limited to:  ACS  Cardiac Arrhythmia  Pericardial tamponade  Valvular Heart Disease including Aortic stenosis  Thoracic aortic dissection  Abdominal Aortic Aneurysm  Pulmonary embolism  Volume Depletion    GI bleeding     Seizure  Medication/drug induced  Metabolic Derangement  Primary Autonomic Failure         -  MDM:  ACS  ECG without ischemic changes,troponin non elevated  Cardiac Arrhythmia  Patient noted to have sinus arrhythmia on monitor, EKG showed left bundle branch block appropriately discordant, no prior EKGs for comparison.  Pericardial tamponade  No pericardial effusion on CTA  Hypertrophic Cardiomyopathy  no exercise associated symptoms  Valvular Heart Disease including Aortic stenosis  Normal cardiac Exam without murmur,no history of valvular heart disease  Thoracic aortic dissection  CTA of the chest without evidence of thoracic aortic dissection  Abdominal Aortic Aneurysm  No abdominal,back or flank pain,abdominal exam without palpable mass  Pulmonary embolism  CTA of the chest negative for pulmonary embolism  Volume Depletion    GI bleeding -hemoglobin normal,no history of hematemesis,melena,rectal bleeding    Seizure  ,no tongue injury,hx bladder incontinence, no seizure-like activity noted by daughter, patient was not postictal  Medication/drug induced  No hx alcohol use,  Patient is on antihypertensive medications but episode occurred while seated without history of orthostasis symptoms  No antiarrhythmic medications  No antianginal medications  No QTc prolonging medications     -Macrolide antibiotics     -Antiemetics      -Antipsychotics  Antidiabetic medications  Metabolic Derangement   -Normal POCT Glc,BMP including Na and glucose        High risk features identified including age and EKG with left bundle branch block of indeterminant age and patient will be admitted to cardiac telemetry for  further evaluation.  Case was discussed with the Hospitalist Service  MDM:     Discharge Vital Signs:/65 (BP Location: Right arm)   Pulse 64   Temp 98.1  F (36.7  C) (Oral)   Resp 18   SpO2 93%    PROCEDURES:   None  Diagnostic studies:  CT Head w/o Contrast   Final Result   IMPRESSION:   1.  No CT evidence for acute intracranial process.   2.  Brain atrophy and chronic ischemic changes as above.      US Lower Extremity Venous Duplex Left   Final Result   IMPRESSION:   1.  No deep venous thrombosis in the left lower extremity.      CT Chest Pulmonary Embolism w Contrast   Final Result   IMPRESSION:   1.  No PE, dissection, or aneurysm.      2.  Stable low-density lesions of both thyroid lobes, correlation with any previous ultrasound exams of thyroid gland would be of benefit, if none are available then a nonemergent ultrasound of the thyroid gland could be performed for further evaluation.      3.  Mild cardiomegaly.      4.  Moderate coronary artery calcification.      5.  Cirrhotic configuration liver with suggestion of splenomegaly.      6.  Prior seen groundglass infiltrates on CT 8/8/2024 have resolved.      Echocardiogram Complete    (Results Pending)     Labs Ordered and Resulted from Time of ED Arrival to Time of ED Departure   BASIC METABOLIC PANEL - Abnormal       Result Value    Sodium 138      Potassium 3.8      Chloride 101      Carbon Dioxide (CO2) 25      Anion Gap 12      Urea Nitrogen 24.0 (*)     Creatinine 0.99 (*)     GFR Estimate 53 (*)     Calcium 8.7 (*)     Glucose 133 (*)    TROPONIN T, HIGH SENSITIVITY - Abnormal    Troponin T, High Sensitivity 19 (*)    CBC WITH PLATELETS AND DIFFERENTIAL - Abnormal    WBC Count 5.9      RBC Count 4.08      Hemoglobin 12.9      Hematocrit 38.5      MCV 94      MCH 31.6      MCHC 33.5      RDW 13.2      Platelet Count 114 (*)     % Neutrophils 68      % Lymphocytes 20      % Monocytes 9      % Eosinophils 2      % Basophils 1      % Immature  Granulocytes 0      NRBCs per 100 WBC 0      Absolute Neutrophils 4.0      Absolute Lymphocytes 1.2      Absolute Monocytes 0.5      Absolute Eosinophils 0.1      Absolute Basophils 0.1      Absolute Immature Granulocytes 0.0      Absolute NRBCs 0.0     D DIMER QUANTITATIVE - Abnormal    D-Dimer Quantitative 1.07 (*)    TROPONIN T, HIGH SENSITIVITY - Abnormal    Troponin T, High Sensitivity 18 (*)    HEPATIC FUNCTION PANEL - Normal    Protein Total 6.8      Albumin 4.1      Bilirubin Total 0.3      Alkaline Phosphatase 88      AST 25      ALT 18      Bilirubin Direct <0.20     LACTIC ACID WHOLE BLOOD WITH 1X REPEAT IN 2 HR WHEN >2 - Normal    Lactic Acid, Initial 0.9     PHENYTOIN LEVEL - Normal    Phenytoin 1.4       ED INTERVENTIONS     Medications   lidocaine 1 % 0.1-1 mL (has no administration in time range)   lidocaine (LMX4) cream (has no administration in time range)   sodium chloride (PF) 0.9% PF flush 3 mL (has no administration in time range)   sodium chloride (PF) 0.9% PF flush 3 mL (has no administration in time range)   senna-docusate (SENOKOT-S/PERICOLACE) 8.6-50 MG per tablet 1 tablet (has no administration in time range)     Or   senna-docusate (SENOKOT-S/PERICOLACE) 8.6-50 MG per tablet 2 tablet (has no administration in time range)   calcium carbonate (TUMS) chewable tablet 1,000 mg (has no administration in time range)   acetaminophen (TYLENOL) tablet 650 mg (has no administration in time range)     Or   acetaminophen (TYLENOL) Suppository 650 mg (has no administration in time range)   ondansetron (ZOFRAN ODT) ODT tab 4 mg (has no administration in time range)     Or   ondansetron (ZOFRAN) injection 4 mg (has no administration in time range)   sodium chloride 0.9% BOLUS 500 mL (has no administration in time range)   LORazepam (ATIVAN) injection 1 mg (has no administration in time range)   amLODIPine (NORVASC) tablet 10 mg (has no administration in time range)   colestipol (COLESTID) tablet 1 g  (has no administration in time range)   hydrochlorothiazide (HYDRODIURIL) tablet 25 mg (has no administration in time range)   losartan (COZAAR) tablet 100 mg (has no administration in time range)   pravastatin (PRAVACHOL) tablet 40 mg (40 mg Oral $Given 11/2/24 0044)   traZODone (DESYREL) tablet 50 mg (50 mg Oral $Given 11/2/24 0043)   phenytoin (DILANTIN) ER capsule 100 mg (100 mg Oral $Given 11/2/24 0044)   iopamidol (ISOVUE-370) solution 75 mL (75 mLs Intravenous $Given 11/1/24 2011)     TOTAL CRITICAL CARE TIME (EXCLUDING PROCEDURES): Not applicable      DISCHARGE MEDICATIONS        Review of your medicines        UNREVIEWED medicines. Ask your doctor about these medicines        Dose / Directions   amLODIPine 10 MG tablet  Commonly known as: NORVASC      Dose: 10 mg  Take 10 mg by mouth daily  Refills: 0     CALCIUM 1200+D3 PO      Dose: 1 tablet  Take 1 tablet by mouth daily.  Refills: 0     calcium carbonate 500 MG chewable tablet  Commonly known as: TUMS      Dose: 1 chew tab  Take 1 chew tab by mouth as needed for heartburn.  Refills: 0     colestipol 1 g tablet  Commonly known as: COLESTID      Dose: 1 g  Take 1 g by mouth daily.  Refills: 0     hydrochlorothiazide 25 MG tablet  Commonly known as: HYDRODIURIL      Dose: 25 mg  [HYDROCHLOROTHIAZIDE (HYDRODIURIL) 25 MG TABLET] Take 25 mg by mouth daily.  Refills: 0     losartan 100 MG tablet  Commonly known as: COZAAR  Ask about: Which instructions should I use?      Dose: 100 mg  Take 100 mg by mouth daily.  Refills: 0     phenytoin 100 MG ER capsule  Commonly known as: DILANTIN      Dose: 100 mg  Take 100 mg by mouth daily.  Refills: 0     pravastatin 40 MG tablet  Commonly known as: PRAVACHOL      Dose: 40 mg  [PRAVASTATIN (PRAVACHOL) 40 MG TABLET] Take 40 mg by mouth bedtime.  Refills: 0     traZODone 50 MG tablet  Commonly known as: DESYREL      Dose: 1 tablet  Take 1 tablet by mouth at bedtime.  Refills: 0            DISPOSITION: Admit to cardiac  telemetry observation/[facility] [place]    Medical Decision Making    At the time of my evaluation, I do not feel the patient s symptoms are caused by sepsis      I obtained additional history from these independent historians:  Patient's 2 daughters  I reviewed these outside records:  Urgent care visit at the [facility] room and [facility] 10/23/2024 where she was seen with left lower extremity pain.  Ultrasound negative for DVT  I noted these abnormal vital signs / labs:  D-dimer 1.07    Monitor Strip Interpretation:  Sinus rhythm with sinus arrhythmia  12-Lead ECG Interpretation:  Sinus rhythm with left bundle branch block, prolonged QTc  I independently reviewed the following diagnostic studies:  CTA of the chest  I spoke to the following clinicians regard the patients care:  Hospitalist  My disposition decision is based on the following reasons:  Admit:      CT Pulmonary Angiogram:The patient had an abnormal d-dimer.  At the conclusion of the encounter I discussed the results of all of the tests and the disposition. The questions were answered. The patient or family acknowledged understanding and was agreeable with the care plan.            INFORMATION SOURCE AND LIMITATIONS    History/Exam limitations: None  Patient information was obtained from: Patient and her daughters  Use of : N/A        REVIEW OF SYSTEMS:   All other systems reviewed and are negative except as noted above in HPI.    PATIENT HISTORY     Past Medical History:   Diagnosis Date    Chronic colitis     Epilepsy (H)     Hypertension     Mixed hyperlipidemia      Patient Active Problem List   Diagnosis    Near syncope     Past Surgical History:   Procedure Laterality Date    COLONOSCOPY N/A 7/7/2022    Procedure: COLONOSCOPY;  Surgeon: Vincent Donnelly MD;  Location: [facility] [place] OR    ENT SURGERY      ESOPHAGOSCOPY, GASTROSCOPY, DUODENOSCOPY (EGD), COMBINED N/A 7/7/2022    Procedure: ESOPHAGOGASTRODUODENOSCOPY WITH;  Surgeon: Andrzej  Vincent Mixon MD;  Location: [facility] Main OR    LUMPECTOMY BREAST Right 01/01/2005       Allergies   Allergen Reactions    Lisinopril Cough    Simvastatin Muscle Pain (Myalgia)    Carbamazepine Rash       OUTPATIENT MEDICATIONS     Current Discharge Medication List         Vitals:    11/01/24 2100 11/01/24 2115 11/01/24 2200 11/01/24 2349   BP: (!) 187/88 (!) 178/97 (!) 186/91 136/65   BP Location:    Right arm   Pulse: 78 80 80 64   Resp: 26 29  18   Temp:    98.1  F (36.7  C)   TempSrc:    Oral   SpO2: 91% 95% 95% 93%       Physical Exam   Constitutional: Oriented to person, place, and time. Appears well-developed and well-nourished.   HEENT:    Head: Atraumatic.   Neck: Normal range of motion. Neck supple.   Cardiovascular: Normal rate, regular rhythm and normal heart sounds.    Pulmonary/Chest: Normal effort  and breath sounds normal.   Abdominal: Soft. Bowel sounds are normal.   Musculoskeletal: Normal range of motion.   Neurological: Alert and oriented to person, place, and time. Normal strength. No sensory deficit. No cranial nerve deficit.  Skin: Skin is warm and dry.   Psychiatric: Normal mood and affect. Behavior is normal. Thought content normal.     DIAGNOSTICS    LABORATORY FINDINGS (REVIEWED AND INTERPRETED):  Labs Ordered and Resulted from Time of ED Arrival to Time of ED Departure   BASIC METABOLIC PANEL - Abnormal       Result Value    Sodium 138      Potassium 3.8      Chloride 101      Carbon Dioxide (CO2) 25      Anion Gap 12      Urea Nitrogen 24.0 (*)     Creatinine 0.99 (*)     GFR Estimate 53 (*)     Calcium 8.7 (*)     Glucose 133 (*)    TROPONIN T, HIGH SENSITIVITY - Abnormal    Troponin T, High Sensitivity 19 (*)    CBC WITH PLATELETS AND DIFFERENTIAL - Abnormal    WBC Count 5.9      RBC Count 4.08      Hemoglobin 12.9      Hematocrit 38.5      MCV 94      MCH 31.6      MCHC 33.5      RDW 13.2      Platelet Count 114 (*)     % Neutrophils 68      % Lymphocytes 20      % Monocytes 9       % Eosinophils 2      % Basophils 1      % Immature Granulocytes 0      NRBCs per 100 WBC 0      Absolute Neutrophils 4.0      Absolute Lymphocytes 1.2      Absolute Monocytes 0.5      Absolute Eosinophils 0.1      Absolute Basophils 0.1      Absolute Immature Granulocytes 0.0      Absolute NRBCs 0.0     D DIMER QUANTITATIVE - Abnormal    D-Dimer Quantitative 1.07 (*)    TROPONIN T, HIGH SENSITIVITY - Abnormal    Troponin T, High Sensitivity 18 (*)    HEPATIC FUNCTION PANEL - Normal    Protein Total 6.8      Albumin 4.1      Bilirubin Total 0.3      Alkaline Phosphatase 88      AST 25      ALT 18      Bilirubin Direct <0.20     LACTIC ACID WHOLE BLOOD WITH 1X REPEAT IN 2 HR WHEN >2 - Normal    Lactic Acid, Initial 0.9     PHENYTOIN LEVEL - Normal    Phenytoin 1.4           IMAGING (REVIEWED AND INTERPRETED):  CT Head w/o Contrast   Final Result   IMPRESSION:   1.  No CT evidence for acute intracranial process.   2.  Brain atrophy and chronic ischemic changes as above.      US Lower Extremity Venous Duplex Left   Final Result   IMPRESSION:   1.  No deep venous thrombosis in the left lower extremity.      CT Chest Pulmonary Embolism w Contrast   Final Result   IMPRESSION:   1.  No PE, dissection, or aneurysm.      2.  Stable low-density lesions of both thyroid lobes, correlation with any previous ultrasound exams of thyroid gland would be of benefit, if none are available then a nonemergent ultrasound of the thyroid gland could be performed for further evaluation.      3.  Mild cardiomegaly.      4.  Moderate coronary artery calcification.      5.  Cirrhotic configuration liver with suggestion of splenomegaly.      6.  Prior seen groundglass infiltrates on CT 8/8/2024 have resolved.      Echocardiogram Complete    (Results Pending)         ECG (REVIEWED AND INTERPRETED):   ECG:   Performed at: 18:21  HR:  85 bpm  Rhythm: Sinus  Axis: 27  QRS duration: 136 ms  QTC: 549 ms  ST changes: No ST segment elevation or  depression, no T wave inversion,No Q wave  Interpretation: Sinus rhythm, left bundle branch block appropriately discordant, prolonged QTc   No prior for comparison    I have reviewed the patient's ECG, with comments made as listed above. Please see scanned image for full interpretation.             Janusz Sanchez D.O.  EMERGENCY MEDICINE   11/01/24  [facility] EMERGENCY DEPARTMENT  [address] [place] [id number]  [phone]  Dept: [phone]      Janusz Sanchez DO  11/02/24 0125

## 2024-11-02 ENCOUNTER — APPOINTMENT (OUTPATIENT)
Dept: NEUROLOGY | Facility: HOSPITAL | Age: 89
End: 2024-11-02
Attending: INTERNAL MEDICINE
Payer: COMMERCIAL

## 2024-11-02 ENCOUNTER — APPOINTMENT (OUTPATIENT)
Dept: CARDIOLOGY | Facility: HOSPITAL | Age: 89
End: 2024-11-02
Attending: INTERNAL MEDICINE
Payer: COMMERCIAL

## 2024-11-02 VITALS
OXYGEN SATURATION: 95 % | WEIGHT: 176.2 LBS | TEMPERATURE: 98 F | HEIGHT: 67 IN | BODY MASS INDEX: 27.65 KG/M2 | SYSTOLIC BLOOD PRESSURE: 167 MMHG | RESPIRATION RATE: 17 BRPM | DIASTOLIC BLOOD PRESSURE: 77 MMHG | HEART RATE: 83 BPM

## 2024-11-02 LAB
ANION GAP SERPL CALCULATED.3IONS-SCNC: 11 MMOL/L (ref 7–15)
ATRIAL RATE - MUSE: 85 BPM
BUN SERPL-MCNC: 18.7 MG/DL (ref 8–23)
CALCIUM SERPL-MCNC: 8.5 MG/DL (ref 8.8–10.4)
CHLORIDE SERPL-SCNC: 103 MMOL/L (ref 98–107)
CREAT SERPL-MCNC: 0.84 MG/DL (ref 0.51–0.95)
DIASTOLIC BLOOD PRESSURE - MUSE: 81 MMHG
EGFRCR SERPLBLD CKD-EPI 2021: 64 ML/MIN/1.73M2
ERYTHROCYTE [DISTWIDTH] IN BLOOD BY AUTOMATED COUNT: 13.2 % (ref 10–15)
GLUCOSE SERPL-MCNC: 116 MG/DL (ref 70–99)
HCO3 SERPL-SCNC: 26 MMOL/L (ref 22–29)
HCT VFR BLD AUTO: 37.2 % (ref 35–47)
HGB BLD-MCNC: 12.2 G/DL (ref 11.7–15.7)
INTERPRETATION ECG - MUSE: NORMAL
LVEF ECHO: NORMAL
MCH RBC QN AUTO: 30.8 PG (ref 26.5–33)
MCHC RBC AUTO-ENTMCNC: 32.8 G/DL (ref 31.5–36.5)
MCV RBC AUTO: 94 FL (ref 78–100)
P AXIS - MUSE: 56 DEGREES
PLATELET # BLD AUTO: 111 10E3/UL (ref 150–450)
POTASSIUM SERPL-SCNC: 3.6 MMOL/L (ref 3.4–5.3)
PR INTERVAL - MUSE: 146 MS
QRS DURATION - MUSE: 136 MS
QT - MUSE: 462 MS
QTC - MUSE: 549 MS
R AXIS - MUSE: 27 DEGREES
RBC # BLD AUTO: 3.96 10E6/UL (ref 3.8–5.2)
SODIUM SERPL-SCNC: 140 MMOL/L (ref 135–145)
SYSTOLIC BLOOD PRESSURE - MUSE: 171 MMHG
T AXIS - MUSE: 155 DEGREES
VENTRICULAR RATE- MUSE: 85 BPM
WBC # BLD AUTO: 4.4 10E3/UL (ref 4–11)

## 2024-11-02 PROCEDURE — 250N000013 HC RX MED GY IP 250 OP 250 PS 637: Performed by: INTERNAL MEDICINE

## 2024-11-02 PROCEDURE — G0378 HOSPITAL OBSERVATION PER HR: HCPCS

## 2024-11-02 PROCEDURE — 36415 COLL VENOUS BLD VENIPUNCTURE: CPT | Performed by: INTERNAL MEDICINE

## 2024-11-02 PROCEDURE — 85018 HEMOGLOBIN: CPT | Performed by: INTERNAL MEDICINE

## 2024-11-02 PROCEDURE — 82947 ASSAY GLUCOSE BLOOD QUANT: CPT | Performed by: INTERNAL MEDICINE

## 2024-11-02 PROCEDURE — 255N000002 HC RX 255 OP 636: Performed by: INTERNAL MEDICINE

## 2024-11-02 PROCEDURE — 99239 HOSP IP/OBS DSCHRG MGMT >30: CPT | Performed by: STUDENT IN AN ORGANIZED HEALTH CARE EDUCATION/TRAINING PROGRAM

## 2024-11-02 PROCEDURE — 999N000208 ECHOCARDIOGRAM COMPLETE

## 2024-11-02 PROCEDURE — 95812 EEG 41-60 MINUTES: CPT

## 2024-11-02 PROCEDURE — 93306 TTE W/DOPPLER COMPLETE: CPT | Mod: 26 | Performed by: INTERNAL MEDICINE

## 2024-11-02 PROCEDURE — 80048 BASIC METABOLIC PNL TOTAL CA: CPT | Performed by: INTERNAL MEDICINE

## 2024-11-02 PROCEDURE — 99222 1ST HOSP IP/OBS MODERATE 55: CPT | Performed by: STUDENT IN AN ORGANIZED HEALTH CARE EDUCATION/TRAINING PROGRAM

## 2024-11-02 RX ORDER — HYDROCHLOROTHIAZIDE 25 MG/1
12.5 TABLET ORAL DAILY
Qty: 30 TABLET | Refills: 0 | Status: SHIPPED | OUTPATIENT
Start: 2024-11-02

## 2024-11-02 RX ADMIN — PERFLUTREN 2 ML: 6.52 INJECTION, SUSPENSION INTRAVENOUS at 13:25

## 2024-11-02 RX ADMIN — TRAZODONE HYDROCHLORIDE 50 MG: 50 TABLET ORAL at 00:43

## 2024-11-02 RX ADMIN — PHENYTOIN SODIUM 100 MG: 100 CAPSULE, EXTENDED RELEASE ORAL at 09:17

## 2024-11-02 RX ADMIN — LOSARTAN POTASSIUM 100 MG: 50 TABLET, FILM COATED ORAL at 09:16

## 2024-11-02 RX ADMIN — PRAVASTATIN SODIUM 40 MG: 20 TABLET ORAL at 00:44

## 2024-11-02 RX ADMIN — PHENYTOIN SODIUM 100 MG: 100 CAPSULE, EXTENDED RELEASE ORAL at 00:44

## 2024-11-02 RX ADMIN — MONTELUKAST SODIUM 1 G: 4 TABLET, CHEWABLE ORAL at 09:17

## 2024-11-02 RX ADMIN — HYDROCHLOROTHIAZIDE 25 MG: 25 TABLET ORAL at 09:16

## 2024-11-02 RX ADMIN — AMLODIPINE BESYLATE 10 MG: 5 TABLET ORAL at 09:16

## 2024-11-02 ASSESSMENT — ACTIVITIES OF DAILY LIVING (ADL)
ADLS_ACUITY_SCORE: 0
DEPENDENT_IADLS:: INDEPENDENT;CLEANING
ADLS_ACUITY_SCORE: 0

## 2024-11-02 NOTE — PLAN OF CARE
Goal Outcome Evaluation:                  PRIMARY DIAGNOSIS: GENERALIZED WEAKNESS    OUTPATIENT/OBSERVATION GOALS TO BE MET BEFORE DISCHARGE  1. Orthostatic performed: Yes:          Lying Orthostatic BP: 176/79         Sitting Orthostatic BP: 166/78         Standing Orthostatic BP: 163/80     2. Tolerating PO medications: Yes    3. Return to near baseline physical activity: Yes    4. Cleared for discharge by consultants (if involved): No    Discharge Planner Nurse   Safe discharge environment identified: Yes  Barriers to discharge: Yes       Entered by: Leena Garay RN 11/02/2024 1:49 PM     Please review provider order for any additional goals.   Nurse to notify provider when observation goals have been met and patient is ready for discharge.

## 2024-11-02 NOTE — PROGRESS NOTES
Patient admitted to room 16 at approximately 2230 via cart from emergency room.  Reason for Admission: syncope  Report received from: Yohannes OCHOA RN  Patient was accompanied by Daughter.  Discharge transportation provided by:  Patient ambulated/transferred:  with stand-by assist. self.  Patient is alert and orientated x 3.  Outpatient Observation education provided to: (patient)  MDRO Education done if applicable (NA)  Safety risks were identified during admission:  fall and seizure.   Yellow risk/fall band applied:  Yes  Home meds sent home: Yes  Home meds sent to pharmacy:No IF YES add 1/2 sheet laminated page reminder to chart/clipboard   Detailed Belongings: blouse-2, pants-2, underwear-2, socks-2, sandals, glasses, Left hearing aid, bra.

## 2024-11-02 NOTE — ED NOTES
[facility] ED Handoff Report    ED Chief Complaint: syncope    ED Diagnosis:  No diagnosis found.     PMH:    Past Medical History:   Diagnosis Date    Chronic colitis     Epilepsy (H)     Hypertension     Mixed hyperlipidemia         Code Status:  No CPR- Do NOT Intubate     Falls Risk: Yes Band: Applied    Current Living Situation/Residence: lives in a house     Elimination Status: Continent: Yes     Activity Level: SBA    Patients Preferred Language:  English     Needed: No    Vital Signs:  BP (!) 186/91   Pulse 80   Temp 97.7  F (36.5  C) (Oral)   Resp 29   SpO2 95%      Cardiac Rhythm: NSR w/BBB    Pain Score: 0/10    Is the Patient Confused:  No    Last Food or Drink: 11/01/24     Focused Assessment:  no syncope at this time, standby assist and ambulatory with some generalized weakness.     Tests Performed: Done: Labs and Imaging    Treatments Provided: -    Family Dynamics/Concerns: No    Family Updated On Visitor Policy: Yes    Plan of Care Communicated to Family: Yes    Who Was Updated about Plan of Care: daughters    Belongings Checklist Done and Signed by Patient: Yes    Belongings Sent with Patient: clothing, glasses, left hearing aid,    Medications sent with patient: NA      RN: Yohannes Frost RN 11/1/2024 10:12 PM

## 2024-11-02 NOTE — CONSULTS
"[facility]  Neurology Consultation    Patient Name:  Chavez Tavares  MRN:  [id number]    :  1931  Date of Service:  2024  Primary care provider:  Silvia Jama      Neurology consultation service was asked to see Chavez Tavares by Dr. Vuong to evaluate for syncope.    Chief Complaint:  syncope    History of Present Illness:   Chavez Tavares is a 89 year old female with history of remote seizure disorder which has been stable for over 40 years on a low-dose of phenytoin, hypertension, hyper lipidemia, who presented after losing consciousness while in the seated position at home.  One of her daughters was giving her a haircut, she had her head leaning forward and then was unresponsive for period of minutes.  She also had a loss of bowel control at that time.  She had no abnormal convulsing movements, her eyes were open, though her daughter does question whether there may have been some rigidity as she tried to adjust her head/neck.  EMS was called and they reported to the patient's family that the blood pressure was \"low\" though I do not have any documentation of what their blood pressure reading was prior to arrival in the ED.  Her systolics were in the 160s by the time she was in the ED, and she had returned back to her baseline.    I gathered collateral history from the patient's 3 children who are at bedside who described that they do not know what her seizures look like since she has not had 1 for so many decades.  She is to be on phenytoin 300 mg daily, and has been stable on phenytoin 100 mg daily for as long as she can remember.    Chavez is able to tell me that she remembers having her haircut, and getting a funny sensation (she was seen to be diaphoretic) though cannot otherwise describe the sensation she had.  She remembers going upstairs with her daughter, and remembers getting evaluated by EMS including the questions that they asked her.  Remembers riding " "in the ambulance and being in the emergency department.  Family thinks that it took her about 2 hours to be back to her baseline.    ROS  A comprehensive ROS was performed and pertinent findings were included in HPI.     PMH  Past Medical History:   Diagnosis Date    Chronic colitis     Epilepsy (H)     Hypertension     Mixed hyperlipidemia      Past Surgical History:   Procedure Laterality Date    COLONOSCOPY N/A 7/7/2022    Procedure: COLONOSCOPY;  Surgeon: Vincent Donnelly MD;  Location: [facility]    ENT SURGERY      ESOPHAGOSCOPY, GASTROSCOPY, DUODENOSCOPY (EGD), COMBINED N/A 7/7/2022    Procedure: ESOPHAGOGASTRODUODENOSCOPY WITH;  Surgeon: Vincent Donnelly MD;  Location: [facility] OR    LUMPECTOMY BREAST Right 01/01/2005       Medications   I have personally reviewed the patient's medication list.     Allergies  I have personally reviewed the patient's allergy list.         Physical Examination   Vitals: /65 (BP Location: Right arm)   Pulse 75   Temp 97.6  F (36.4  C) (Oral)   Resp 17   Ht 1.702 m (5' 7\")   Wt 79.9 kg (176 lb 3.2 oz)   SpO2 97%   BMI 27.60 kg/m    General: Lying in bed, NAD  Head: NC/AT  Eyes: no icterus, op pink and moist  Cardiac: RRR. Extremities warm, no edema.   Respiratory: non-labored on RA  GI: S/NT/ND  Skin: No rash or lesion on exposed skin  Psych: Mood pleasant, affect congruent  Neuro:  Mental status: Awake, alert, attentive, oriented to self, time, place, and circumstance.   Cranial nerves: VFF, PERRL, conjugate gaze, EOMI, facial sensation intact, face symmetric, shoulder shrug strong, tongue/uvula midline, no dysarthria.   Motor: Normal bulk and tone. No abnormal movements.  Able to move all extremities antigravity, able to reposition in her bed without any problem  Gait: Not assessed    Investigations   I have personally reviewed pertinent labs, tests, and radiological imaging. Discussion of notable findings is included under Impression. "     I have reviewed labs including Normal sodium, potassium, creatinine, white count, platelet 111, hemoglobin 12    Was patient transferred from outside hospital?   No    Impression  #Syncope  #Hypotension/orthostatic hypotension suspected  #History of seizure disorder, stable    Given the nature of this event, the etiology is most likely syncope given that she had a prodromal sensation that included being diaphoretic, appeared to have lost consciousness for a brief amount of time, but was otherwise intact even when she was being evaluated by EMS, and transported in the ambulance.  Without any positive symptoms to suggest seizure, plus the reports of systolic blood pressure being in the 70s (this is the best recollection of the daughter who is with her at the time).  I therefore have no indication to change antiepileptic medication, and we spent some time discussing adequate hydration to prevent orthostatic hypotension.    Note that orthostatics were checked here and were normal, though this was after she was loaded with fluids in the emergency department.    Recommendations  -EEG performed, read is pending  - Continue PTA Dilantin 100 mg daily  - Discussed maintaining adequate hydration and p.o. intake with patient's 5 children  - If low blood pressure is recurrent, I would reduce patient's antihypertensive medications  - Okay to discharge from neurology perspective, family's questions were all answered    Thank you for involving Neurology in the care of Chavez Tavares.  Please do not hesitate to call with questions    Douglas Sullivan MD    Billed as a level 4 consultation due to patient presenting with an acute on chronic illness posing a severe threat to bodily function.  I have reviewed notes from the primary team, nursing notes.  And I have reviewed labs above including ALT, AST, BMP, CBC.

## 2024-11-02 NOTE — PLAN OF CARE
Goal Outcome Evaluation:Pt discharging home, discharge instruction given, pt and family verbalized understanding of discharge instruction, belongings with the pt.

## 2024-11-02 NOTE — DISCHARGE SUMMARY
"[facility]  Hospitalist Discharge Summary      Date of Admission:  11/1/2024  Date of Discharge:  11/2/2024  Discharging Provider: Indra Vuong DO  Discharge Service: Hospitalist Service    Discharge Diagnoses   Active Problems:    Near syncope        Clinically Significant Risk Factors     # Overweight: Estimated body mass index is 27.6 kg/m  as calculated from the following:    Height as of this encounter: 1.702 m (5' 7\").    Weight as of this encounter: 79.9 kg (176 lb 3.2 oz).       Follow-ups Needed After Discharge   Follow-up Appointments       Hospital Follow-up with Existing Primary Care Provider (PCP)      Please see details below         Schedule Primary Care visit within: 14 Days               Discharge Disposition   Discharged to home  Condition at discharge: Stable    Hospital Course   Patient presented 11/1/24 with near syncopal episode. Was noted to have low BP per EMS. CT head, CTA chest, TTE and cardiac work-up unrevealing other than known, stable bundle branch block. Neurology was consulted. EEG was obtained. Event likely occurred due to orthostatic hypotension from poor oral intake. She received fluids in the ER, so when orthostatics were checked in the hospital they were normal. Discussed with patient and family. Will plan to discharge today as patient remains stable. Decrease hydrochlorothiazide dose as noted below. Advised checking BP at home and will have close outpatient follow-up for further dose titration pending oral intake and BP measurements. Home RN on discharge. Advised if symptoms reoccur to present back to urgent care or ER. Patient should also follow-up with PCP regarding thyroid lesions noted on imaging.     Consultations This Hospital Stay   NEUROLOGY IP CONSULT  PHYSICAL THERAPY ADULT IP CONSULT  OCCUPATIONAL THERAPY ADULT IP CONSULT  CARE MANAGEMENT / SOCIAL WORK IP CONSULT    Code Status   No CPR- Do NOT Intubate    Time Spent on this Encounter   I, " Indra Vuong DO, personally saw the patient today and spent greater than 30 minutes discharging this patient.       Indra Vuong DO  [facility]AY  [address] [place] [id number]  Phone: [phone]  Fax: [phone]  ______________________________________________________________________    Physical Exam   Vital Signs: Temp: 98  F (36.7  C) Temp src: Oral BP: (!) 167/77 Pulse: 83   Resp: 17 SpO2: 95 % O2 Device: None (Room air) Oxygen Delivery: 2 LPM  Weight: 176 lbs 3.2 oz  General Appearance:  no acute distress, cooperative and non-toxic  Respiratory: CTAB  Cardiovascular: normal S1 and S2 with some ectopy, no mottling or cyanosis   GI: Soft nontender no edema  Skin: No erythema  Other:  No neuro weakness, upper or lower extremities normal, no focal deficits        Primary Care Physician   Silvia Jama    Discharge Orders      Home Care Referral      Reason for your hospital stay    Active Problems:    Near syncope     Activity    Your activity upon discharge: activity as tolerated     Diet    Follow this diet upon discharge: Current Diet:Orders Placed This Encounter      Combination Diet Low Saturated Fat Na <2400mg Diet, No Caffeine Diet     Hospital Follow-up with Existing Primary Care Provider (PCP)    Please see details below            Significant Results and Procedures   Results for orders placed or performed during the hospital encounter of 11/01/24   CT Chest Pulmonary Embolism w Contrast    Narrative    EXAM: CT CHEST PULMONARY EMBOLISM W CONTRAST  LOCATION: [facility]  DATE: 11/1/2024    INDICATION: Syncope, elevated d-dimer.  COMPARISON: CT 8/8/2024.  TECHNIQUE: CT chest pulmonary angiogram during arterial phase injection of IV contrast. Multiplanar reformats and MIP reconstructions were performed. Dose reduction techniques were used.   CONTRAST: Isovue-370, 75 mL.    FINDINGS:  ANGIOGRAM CHEST:  Pulmonary arteries are normal caliber and negative for pulmonary emboli. Thoracic aorta is negative for dissection. No CT evidence of right heart strain.    LUNGS AND PLEURA: Since 8/8/2024, the prior seen scattered groundglass infiltrates in the right lung have resolved. Otherwise there has been no other significant changes with again seen findings most typical for bilateral apical pleural   thickening/scarring. Chronic atelectasis versus scarring in the right middle lobe.    MEDIASTINUM/AXILLAE: Stable low-density lesions in both thyroid lobes, correlation with any previous ultrasound exams of the thyroid gland would be of help, if none are available then nonemergent ultrasound of the thyroid gland could be performed for   further evaluation, if clinically indicated. There are scattered nonpathologic-sized lymph nodes seen in the mediastinum. Mild cardiomegaly. Mild mitral annular calcification.    CORONARY ARTERY CALCIFICATION: Moderate.    UPPER ABDOMEN: There is a cirrhotic configuration seen of the liver. The spleen is not entirely included on this study, but there is a suggestion of mild splenomegaly.    MUSCULOSKELETAL: Old mid thoracic spine compression fracture. Mild scattered hypertrophic changes.      Impression    IMPRESSION:  1.  No PE, dissection, or aneurysm.    2.  Stable low-density lesions of both thyroid lobes, correlation with any previous ultrasound exams of thyroid gland would be of benefit, if none are available then a nonemergent ultrasound of the thyroid gland could be performed for further evaluation.    3.  Mild cardiomegaly.    4.  Moderate coronary artery calcification.    5.  Cirrhotic configuration liver with suggestion of splenomegaly.    6.  Prior seen groundglass infiltrates on CT 8/8/2024 have resolved.   US Lower Extremity Venous Duplex Left    Narrative    EXAM: US LOWER EXTREMITY VENOUS DUPLEX LEFT  LOCATION: [facility]  DATE: 11/1/2024    INDICATION:  left leg pain elevated D Dimer  COMPARISON: None.  TECHNIQUE: Venous Duplex ultrasound of the left lower extremity with and without compression, augmentation and duplex. Color flow and spectral Doppler with waveform analysis performed.    FINDINGS: Exam includes the common femoral, femoral, popliteal, and contralateral common femoral veins as well as segmentally visualized deep calf veins and greater saphenous vein.     LEFT: No deep vein thrombosis. No superficial thrombophlebitis. No popliteal cyst.      Impression    IMPRESSION:  1.  No deep venous thrombosis in the left lower extremity.   CT Head w/o Contrast    Narrative    EXAM: CT HEAD W/O CONTRAST  LOCATION: [facility]  DATE: 2024    INDICATION: Syncope, history of seizures.  COMPARISON: 2024.  TECHNIQUE: Routine CT Head without IV contrast. Multiplanar reformats. Dose reduction techniques were used.    FINDINGS:  INTRACRANIAL CONTENTS: No intracranial hemorrhage, extraaxial collection, or mass effect.  Chronic lacunar infarction left anterior basal ganglia. Mild presumed chronic small vessel ischemic changes. Mild generalized volume loss. No hydrocephalus.     VISUALIZED ORBITS/SINUSES/MASTOIDS: No intraorbital abnormality. No paranasal sinus mucosal disease. No middle ear or mastoid effusion.    BONES/SOFT TISSUES: No acute abnormality.      Impression    IMPRESSION:  1.  No CT evidence for acute intracranial process.  2.  Brain atrophy and chronic ischemic changes as above.   Echocardiogram Complete     Value    LVEF  60-65%    Narrative    [id number]  [id number]  [id number]  [id number]^MIC^[place] [address]     Name: DEYSI MENEZES  MRN: [id number]  : 1931  Study Date: 2024 12:47 PM  Age: 89 yrs  Gender: Female  Patient Location: [facility]  Reason For Study: Abn EKG  Ordering Physician: SCAR HAILE  Performed By: KRYSTAL     BSA: 1.9 m2  Height: 67 in  Weight:  176 lb  HR: 73  BP: 167/77 mmHg  ______________________________________________________________________________  Procedure  Complete Echo Adult. Definity (NDC #17362-358) given intravenously.  Technically difficult study. Poor acoustic windows. No hemodynamically  significant valvular abnormalities on 2D or color flow imaging. There is no  comparison study available.  ______________________________________________________________________________  Interpretation Summary     The left ventricle is normal in size with moderate concentric left ventricular  hypertrophy.  Left ventricular function is normal.The ejection fraction is 60-65%.  Normal right ventricle size and systolic function.  No hemodynamically significant valvular abnormalities on 2D or color flow  imaging.  There is no comparison study available.  ______________________________________________________________________________  Left Ventricle  The left ventricle is normal in size. Left ventricular function is normal.The  ejection fraction is 60-65%. There is moderate concentric left ventricular  hypertrophy. Left ventricular diastolic function is abnormal. No regional wall  motion abnormalities noted.     Right Ventricle  Normal right ventricle size and systolic function.     Atria  The left atrium is mildly dilated. The right atrium is mildly dilated. There  is no color Doppler evidence of an atrial shunt.     Mitral Valve  Mitral valve leaflets appear normal. There is no evidence of mitral stenosis  or clinically significant mitral regurgitation.     Tricuspid Valve  Tricuspid valve leaflets appear normal. There is no evidence of tricuspid  stenosis or clinically significant tricuspid regurgitation. Right ventricular  systolic pressure could not be approximated due to inadequate tricuspid  regurgitation.     Aortic Valve  The aortic valve is trileaflet. Aortic valve leaflets appear normal. There is  no evidence of aortic stenosis or clinically significant  aortic regurgitation.     Pulmonic Valve  The pulmonic valve is not well seen, but is grossly normal. This degree of  valvular regurgitation is within normal limits. There is trace pulmonic  valvular regurgitation.     Vessels  The aorta root is normal. Normal size ascending aorta. IVC diameter <2.1 cm  collapsing >50% with sniff suggests a normal RA pressure of 3 mmHg.     Pericardium  There is no pericardial effusion.     Rhythm  Sinus rhythm was noted.  ______________________________________________________________________________  MMode/2D Measurements & Calculations     IVSd: 1.7 cm  LVIDd: 3.8 cm  LVIDs: 2.7 cm  LVPWd: 1.2 cm  FS: 28.0 %  LV mass(C)d: 202.2 grams  LV mass(C)dI: 105.6 grams/m2  Ao root diam: 3.1 cm  LA dimension: 4.4 cm  LA/Ao: 1.4  LVOT diam: 2.0 cm  LVOT area: 3.1 cm2  Ao root diam index Ht(cm/m): 1.8  Ao root diam index BSA (cm/m2): 1.6  LA Volume Indexed (AL/bp): 37.9 ml/m2  RV Base: 3.6 cm     RWT: 0.63     Doppler Measurements & Calculations  MV E max jorge: 63.9 cm/sec  MV A max jorge: 131.0 cm/sec  MV E/A: 0.49  MV max P.3 mmHg  MV mean P.0 mmHg  MV V2 VTI: 24.6 cm  MVA(VTI): 3.7 cm2  MV dec slope: 324.0 cm/sec2  MV dec time: 0.20 sec  Ao V2 max: 164.0 cm/sec  Ao max P.0 mmHg  Ao V2 mean: 133.0 cm/sec  Ao mean P.0 mmHg  Ao V2 VTI: 35.5 cm  GENEVA(I,D): 2.5 cm2  GENEVA(V,D): 2.6 cm2  LV V1 max P.6 mmHg  LV V1 max: 138.0 cm/sec  LV V1 VTI: 28.8 cm  SV(LVOT): 90.5 ml  SI(LVOT): 47.2 ml/m2  PA acc time: 0.09 sec  AV Jorge Ratio (DI): 0.84  GENEVA Index (cm2/m2): 1.3  E/E': 16.8  E/E' av.4  Lateral E/e': 12.0  Medial E/e': 16.8     Peak E' Jorge: 3.8 cm/sec  RV S Jorge: 13.2 cm/sec     ______________________________________________________________________________  Report approved by: Keely Delcid 2024 02:46 PM             Discharge Medications   Current Discharge Medication List        CONTINUE these medications which have CHANGED    Details   hydrochlorothiazide  (HYDRODIURIL) 25 MG tablet Take 0.5 tablets (12.5 mg) by mouth daily.  Qty: 30 tablet, Refills: 0    Associated Diagnoses: Hypertension, unspecified type           CONTINUE these medications which have NOT CHANGED    Details   amLODIPine (NORVASC) 10 MG tablet Take 10 mg by mouth daily      calcium carbonate (TUMS) 500 MG chewable tablet Take 1 chew tab by mouth as needed for heartburn.      Calcium-Magnesium-Vitamin D (CALCIUM 1200+D3 PO) Take 1 tablet by mouth daily.      colestipol (COLESTID) 1 g tablet Take 1 g by mouth daily.      losartan (COZAAR) 100 MG tablet Take 100 mg by mouth daily.      phenytoin (DILANTIN) 100 MG ER capsule Take 100 mg by mouth daily.      pravastatin (PRAVACHOL) 40 MG tablet [PRAVASTATIN (PRAVACHOL) 40 MG TABLET] Take 40 mg by mouth bedtime.      traZODone (DESYREL) 50 MG tablet Take 1 tablet by mouth at bedtime.           Allergies   Allergies   Allergen Reactions    Lisinopril Cough    Simvastatin Muscle Pain (Myalgia)    Carbamazepine Rash

## 2024-11-02 NOTE — PLAN OF CARE
Problem: Adult Inpatient Plan of Care  Goal: Optimal Comfort and Wellbeing  Outcome: Progressing  Intervention: Monitor Pain and Promote Comfort  Recent Flowsheet Documentation  Taken 11/2/2024 0501 by Jonna Abreu RN  Pain Management Interventions:   rest   quiet environment facilitated  Taken 11/2/2024 0046 by Jonna Abreu RN  Pain Management Interventions:   rest   quiet environment facilitated     Problem: Pain Acute  Goal: Optimal Pain Control and Function  Outcome: Progressing  Intervention: Develop Pain Management Plan  Recent Flowsheet Documentation  Taken 11/2/2024 0501 by Jonna Abreu RN  Pain Management Interventions:   rest   quiet environment facilitated  Taken 11/2/2024 0046 by Jonna Abreu RN  Pain Management Interventions:   rest   quiet environment facilitated  Intervention: Prevent or Manage Pain  Recent Flowsheet Documentation  Taken 11/2/2024 0500 by Jonna Abreu RN  Medication Review/Management: medications reviewed  Taken 11/2/2024 0048 by Jonna Abreu RN  Medication Review/Management: medications reviewed     Problem: Seizure, Active Management  Goal: Absence of Seizure/Seizure-Related Injury  Outcome: Progressing     Problem: Adult Inpatient Plan of Care  Goal: Absence of Hospital-Acquired Illness or Injury  Intervention: Prevent Skin Injury  Recent Flowsheet Documentation  Taken 11/2/2024 0500 by Jonna Abreu RN  Body Position: position changed independently  Taken 11/2/2024 0048 by Jonna Abreu RN  Body Position: position changed independently   Goal Outcome Evaluation:       Patient admitted for near Syncope. VSS. On 2L 02 during the night. Neuro consult. On tele, Intermittent arrhythmia with BBB. Low fat sat diet, Na<2400 mg, no caffeine diet. Denies pain throughout the night. SBA with mobility. Hx of seizure disorder. Seizure pads on the bed. Left PIV saline locked. Slept well during the night.

## 2024-11-02 NOTE — CONSULTS
Care Management Initial Consult    General Information  Assessment completed with: Patient, Children, pt and son, and dtr   Type of CM/SW Visit: Initial Assessment    Primary Care Provider verified and updated as needed: Yes   Readmission within the last 30 days: no previous admission in last 30 days      Reason for Consult: discharge planning, length of stay  Advance Care Planning: Advance Care Planning Reviewed: no concerns identified, verified with patient     General Information Comments: pt lives alone and independent at baseline and no svcs except housekeeping, would benefit from some homecare visits for medication management    Communication Assessment  Patient's communication style: spoken language (English or Bilingual)    Hearing Difficulty or Deaf: yes   Wear Glasses or Blind: yes    Cognitive  Cognitive/Neuro/Behavioral: WDL                      Living Environment:   People in home: alone     Current living Arrangements: town home      Able to return to prior arrangements: yes       Family/Social Support:  Care provided by: self  Provides care for: no one  Marital Status: Single  Support system: Children          Description of Support System: Supportive, Involved    Support Assessment: Adequate family and caregiver support, Adequate social supports    Current Resources:   Patient receiving home care services: No        Community Resources: None  Equipment currently used at home: none  Supplies currently used at home: None    Employment/Financial:  Employment Status:          Financial Concerns: none   Referral to Financial Worker: No       Does the patient's insurance plan have a 3 day qualifying hospital stay waiver?  No    Lifestyle & Psychosocial Needs:  Social Drivers of Health     Food Insecurity: Low Risk  (11/1/2024)    Food Insecurity     Within the past 12 months, did you worry that your food would run out before you got money to buy more?: No     Within the past 12 months, did the food you  bought just not last and you didn t have money to get more?: No   Depression: Not at risk (11/27/2023)    Received from [facility]    PHQ-2     PHQ-2 TOTAL SCORE: 0   Housing Stability: Low Risk  (11/1/2024)    Housing Stability     Do you have housing? : Yes     Are you worried about losing your housing?: No   Tobacco Use: Medium Risk (10/23/2024)    Received from [facility]    Patient History     Smoking Tobacco Use: Former     Smokeless Tobacco Use: Never     Passive Exposure: Not on file   Financial Resource Strain: Low Risk  (11/1/2024)    Financial Resource Strain     Within the past 12 months, have you or your family members you live with been unable to get utilities (heat, electricity) when it was really needed?: No   Alcohol Use: Not on file   Transportation Needs: Low Risk  (11/1/2024)    Transportation Needs     Within the past 12 months, has lack of transportation kept you from medical appointments, getting your medicines, non-medical meetings or appointments, work, or from getting things that you need?: No   Physical Activity: Not on file   Interpersonal Safety: Not on file   Stress: Not on file   Social Connections: Socially Integrated (11/28/2023)    Received from [facility]    Social Connections     Do you often feel lonely or isolated from those around you?: 0   Health Literacy: Not on file       Functional Status:  Prior to admission patient needed assistance:   Dependent ADLs:: Independent  Dependent IADLs:: Independent, Cleaning  Assesssment of Functional Status: Not at  functional baseline    Mental Health Status:  Mental Health Status: No Current Concerns       Chemical Dependency Status:                Values/Beliefs:  Spiritual, Cultural Beliefs, Amish Practices, Values that affect care:                 Discussed  Partnership in Safe Discharge Planning  document with patient/family:  No    Additional Information:  sadie  will be  for homecare scheduling. pt lives alone and independent at baseline and no svcs except housekeeping, would benefit from some homecare visits for medication management    Next Steps: Neuro Consult, Occupational Therapy consult and DAJA Valerio RN

## 2024-11-02 NOTE — H&P
[facility]    History and Physical - Hospitalist Service       Date of Admission:  11/1/2024    89-year-old patient with history of hypertension, seizure disorder on medications, high cholesterol presenting for witnessed syncope/LOC today.    Assessment & Plan    Loss of consciousness, witnessed lasting a few minutes, with fecal incontinence, no neurodeficits on exam, given history of seizure disorder on phenytoin, also phenytoin level subtherapeutic, will admit for possible seizure, CT chest no PE, mild cardiomegaly, check CT head tonight, neurology consult, seizure pads ordered, as needed Ativan if seizure recurs    Chronic LBBB on EKG, no chest pain, troponin delta negative, given cad risk factors will rule out arrhythmia, place on cardiac monitoring, echo ordered for a.m.    History of seizure disorder, last episode 40 years ago, continues on phenytoin, EEG ordered, neurology evaluation    High cholesterol continue statin     Dehydration, mildly high creatinine and BUN ratio, IVf bolus in the ED, encourage p.o. fluid intake    Mild thrombocytopenia, this is chronic, follow platelet count closely    DNR/DNI per discussion with patient and family at bedside.    addendum  Given low phenytoin levels, , will discuss with neurology tonight if additional meds needed    Spoke to neurology , he does not recommend phenytoin load tonight, only if she has seizure recurrence       Diet: Combination Diet Low Saturated Fat Na <2400mg Diet, No Caffeine Diet    DVT Prophylaxis: Low Risk/Ambulatory with no VTE prophylaxis indicated  Harris Catheter: Not present  Lines: None     Cardiac Monitoring: ACTIVE order. Indication: Tachyarrhythmias, acute (48 hours)  Code Status: Full Code      Clinically Significant Risk Factors Present on Admission                 # Drug Induced Platelet Defect: home medication list includes an antiplatelet medication   # Hypertension: Home medication list includes  antihypertensive(s)                    Disposition Plan     Medically Ready for Discharge: Anticipated Tomorrow           Chong Jean MD  Hospitalist Service  [facility]  Securely message with [employer] (more info)  Text page via [employer] Paging/Directory     ______________________________________________________________________    Chief Complaint   Witnessed LOC today    History is obtained from the patient    History of Present Illness   Chavez Tavares is a 89 year old female who brought in by family members for evaluation of syncope which was noticed by daughter today.  Patient was sitting down At home by the daughter, she noticed patient had slumped was unconscious for about a minute, she was still breathing, no spontaneous seizure disorders noted, she did have some fecal incontinence afterwards.  Also noticed some confusion after the episode.  Denies any chest pain or shortness of breath, no facial weakness, no upper or lower extremity weakness.    Patient has a history of seizure disorder reportedly last episode was about 4 years ago.  No  or GI symptoms, no fever and no other neurological symptoms    In ED she had elevated blood pressure 164/85, pulse was 78, she was alert and conscious, not in painful distress, no facial weakness.  ED performed a CT chest which showed no PE, EKG showed chronic LBP, she is admitted for syncope evaluation      Past Medical History    Past Medical History:   Diagnosis Date    Chronic colitis     Epilepsy (H)     Hypertension     Mixed hyperlipidemia        Past Surgical History   Past Surgical History:   Procedure Laterality Date    COLONOSCOPY N/A 7/7/2022    Procedure: COLONOSCOPY;  Surgeon: Vincent Donnelly MD;  Location: [facility] [place] OR    ENT SURGERY      ESOPHAGOSCOPY, GASTROSCOPY, DUODENOSCOPY (EGD), COMBINED N/A 7/7/2022    Procedure: ESOPHAGOGASTRODUODENOSCOPY WITH;  Surgeon: Vincent Donnelly MD;  Location: [facility] [place] OR     LUMPECTOMY BREAST Right 01/01/2005       Prior to Admission Medications   Prior to Admission Medications   Prescriptions Last Dose Informant Patient Reported? Taking?   amLODIPine (NORVASC) 10 MG tablet   Yes No   Sig: Take 10 mg by mouth daily   ascorbic acid (ASCORBIC ACID WITH AASHISH HIPS) 500 MG tablet   Yes No   Sig: [ASCORBIC ACID (ASCORBIC ACID WITH AASHISH HIPS) 500 MG TABLET] Take 500 mg by mouth daily.   aspirin 81 mg chewable tablet   Yes No   Sig: [ASPIRIN 81 MG CHEWABLE TABLET] Chew 81 mg daily.   calcium carbonate (OS-SONG) 600 mg (1,500 mg) tablet   Yes No   Sig: [CALCIUM CARBONATE (OS-SONG) 600 MG (1,500 MG) TABLET] Take 600 mg by mouth 3 (three) times a day with meals.   cholecalciferol 25 MCG (1000 UT) TABS   Yes No   Sig: Take 1 tablet by mouth daily   colestipol (COLESTID) 1 g tablet   Yes No   Sig: Take 1 g by mouth 2 times daily   hydrochlorothiazide (HYDRODIURIL) 25 MG tablet   Yes No   Sig: [HYDROCHLOROTHIAZIDE (HYDRODIURIL) 25 MG TABLET] Take 25 mg by mouth daily.   ibuprofen (ADVIL,MOTRIN) 200 MG tablet   Yes No   Sig: [IBUPROFEN (ADVIL,MOTRIN) 200 MG TABLET] Take 200 mg by mouth every 6 (six) hours as needed for pain.   losartan (COZAAR) 50 MG tablet   Yes No   Sig: [LOSARTAN (COZAAR) 50 MG TABLET] Take 50 mg by mouth daily.   melatonin 5 MG tablet   Yes No   Sig: Take 5 mg by mouth nightly as needed for sleep   multivitamin with minerals (THERA-M) 9 mg iron-400 mcg Tab tablet   Yes No   Sig: [MULTIVITAMIN WITH MINERALS (THERA-M) 9 MG IRON-400 MCG TAB TABLET] Take 1 tablet by mouth daily.   phenytoin (DILANTIN) 100 MG ER capsule   Yes No   Sig: [PHENYTOIN (DILANTIN) 100 MG ER CAPSULE] Take by mouth 3 (three) times a day.   pravastatin (PRAVACHOL) 40 MG tablet   Yes No   Sig: [PRAVASTATIN (PRAVACHOL) 40 MG TABLET] Take 40 mg by mouth bedtime.      Facility-Administered Medications: None        Physical Exam   Vital Signs: Temp: 97.7  F (36.5  C) Temp src: Oral BP: (!) 158/80 Pulse: 77   Resp: 24  SpO2: 93 % O2 Device: None (Room air)    Weight: 0 lbs 0 oz    General Appearance: AAOx3, no facial weakness,  Respiratory: Clear anteriorly  Cardiovascular: S1-S2 only  GI: Soft nontender no edema  Skin: No erythema  Other: No neuro weakness, upper or lower extremities normal power,    Medical Decision Making       7 0 MINUTES SPENT BY ME on the date of service doing chart review, history, exam, documentation & further activities per the note.      Data   Imaging results reviewed over the past 24 hrs:   Recent Results (from the past 24 hours)   CT Chest Pulmonary Embolism w Contrast    Narrative    EXAM: CT CHEST PULMONARY EMBOLISM W CONTRAST  LOCATION: [facility]  DATE: 11/1/2024    INDICATION: Syncope, elevated d-dimer.  COMPARISON: CT 8/8/2024.  TECHNIQUE: CT chest pulmonary angiogram during arterial phase injection of IV contrast. Multiplanar reformats and MIP reconstructions were performed. Dose reduction techniques were used.   CONTRAST: Isovue-370, 75 mL.    FINDINGS:  ANGIOGRAM CHEST: Pulmonary arteries are normal caliber and negative for pulmonary emboli. Thoracic aorta is negative for dissection. No CT evidence of right heart strain.    LUNGS AND PLEURA: Since 8/8/2024, the prior seen scattered groundglass infiltrates in the right lung have resolved. Otherwise there has been no other significant changes with again seen findings most typical for bilateral apical pleural   thickening/scarring. Chronic atelectasis versus scarring in the right middle lobe.    MEDIASTINUM/AXILLAE: Stable low-density lesions in both thyroid lobes, correlation with any previous ultrasound exams of the thyroid gland would be of help, if none are available then nonemergent ultrasound of the thyroid gland could be performed for   further evaluation, if clinically indicated. There are scattered nonpathologic-sized lymph nodes seen in the mediastinum. Mild cardiomegaly. Mild mitral annular  calcification.    CORONARY ARTERY CALCIFICATION: Moderate.    UPPER ABDOMEN: There is a cirrhotic configuration seen of the liver. The spleen is not entirely included on this study, but there is a suggestion of mild splenomegaly.    MUSCULOSKELETAL: Old mid thoracic spine compression fracture. Mild scattered hypertrophic changes.      Impression    IMPRESSION:  1.  No PE, dissection, or aneurysm.    2.  Stable low-density lesions of both thyroid lobes, correlation with any previous ultrasound exams of thyroid gland would be of benefit, if none are available then a nonemergent ultrasound of the thyroid gland could be performed for further evaluation.    3.  Mild cardiomegaly.    4.  Moderate coronary artery calcification.    5.  Cirrhotic configuration liver with suggestion of splenomegaly.    6.  Prior seen groundglass infiltrates on CT 8/8/2024 have resolved.

## 2024-11-02 NOTE — MEDICATION SCRIBE - ADMISSION MEDICATION HISTORY
Medication Scribe Admission Medication History    Admission medication history is complete. The information provided in this note is only as accurate as the sources available at the time of the update.    Information Source(s): Prescription bottles via N/A    Pertinent Information: Patient reports self management of medications. Patient reports being unable to recall medication names or doses.    Patient's daughter Preston went back to patient's home and retrieved medications. There was no filled medication tray. There was a partially filled medication tray that contained Colestipol 1 gram tablets (2 per day). There also was several Colestipol tablets loose near the sink.     Patient was interviewed by displaying medication bottles and asking if she took this medication. Patient reports conflicting statements.     Several medications were : Phenytoin, Amlodipine, Zofran ODT    Phenytoin: patient reports taking daily, not TID    Colestipol: patient reports taking daily, not BID.     PTA list is comprised of medications that were found at patient's home. Marked as taking sometime during the last month. Medications not present were deleted from profile. Patient's daughters (Jewell) were present during discussion.     Changes made to PTA medication list:  Added: Tums, Calcium & D3  Deleted: Vit C, Aspirin, Oscal, D3, Advil, Melatonin, TheraM  Changed: Losartan from 50 to 100, Dilantin from TID to every day, Colestipol from BID to every day,     Allergies reviewed with patient and updates made in EHR: yes    Medication History Completed By: Primitivo Santos 2024 10:25 PM    PTA Med List   Medication Sig Last Dose/Taking    amLODIPine (NORVASC) 10 MG tablet Take 10 mg by mouth daily Past Month Morning    calcium carbonate (TUMS) 500 MG chewable tablet Take 1 chew tab by mouth as needed for heartburn. Taking As Needed    Calcium-Magnesium-Vitamin D (CALCIUM 1200+D3 PO) Take 1 tablet by mouth daily. Past Month  Morning    colestipol (COLESTID) 1 g tablet Take 1 g by mouth daily. Past Month Morning    hydrochlorothiazide (HYDRODIURIL) 25 MG tablet [HYDROCHLOROTHIAZIDE (HYDRODIURIL) 25 MG TABLET] Take 25 mg by mouth daily. Past Month Morning    losartan (COZAAR) 100 MG tablet Take 100 mg by mouth daily. Past Month Morning    phenytoin (DILANTIN) 100 MG ER capsule Take 100 mg by mouth daily. Past Month Morning    pravastatin (PRAVACHOL) 40 MG tablet [PRAVASTATIN (PRAVACHOL) 40 MG TABLET] Take 40 mg by mouth bedtime. Past Month Evening    traZODone (DESYREL) 50 MG tablet Take 1 tablet by mouth at bedtime. Past Month Bedtime

## 2024-11-02 NOTE — PLAN OF CARE
"PRIMARY DIAGNOSIS: \"GENERIC\" NURSING  OUTPATIENT/OBSERVATION GOALS TO BE MET BEFORE DISCHARGE:  ADLs back to baseline: No    Activity and level of assistance: Up with standby assistance.    Pain status: Pain free.    Return to near baseline physical activity: Yes     Discharge Planner Nurse   Safe discharge environment identified: Yes  Barriers to discharge: Yes       Entered by: Jonna Abreu RN 11/02/2024 3:27 AM     Please review provider order for any additional goals.   Nurse to notify provider when observation goals have been met and patient is ready for discharge.Goal Outcome Evaluation:                        "

## 2024-11-03 PROCEDURE — 95816 EEG AWAKE AND DROWSY: CPT | Mod: 26 | Performed by: PSYCHIATRY & NEUROLOGY

## 2024-11-05 ENCOUNTER — PATIENT OUTREACH (OUTPATIENT)
Dept: CARE COORDINATION | Facility: CLINIC | Age: 89
End: 2024-11-05
Payer: COMMERCIAL

## 2025-07-31 ENCOUNTER — APPOINTMENT (OUTPATIENT)
Dept: CT IMAGING | Facility: HOSPITAL | Age: OVER 89
End: 2025-07-31
Attending: EMERGENCY MEDICINE
Payer: COMMERCIAL

## 2025-07-31 ENCOUNTER — APPOINTMENT (OUTPATIENT)
Dept: CT IMAGING | Facility: HOSPITAL | Age: OVER 89
End: 2025-07-31
Payer: COMMERCIAL

## 2025-07-31 ENCOUNTER — TRANSFERRED RECORDS (OUTPATIENT)
Dept: HEALTH INFORMATION MANAGEMENT | Facility: CLINIC | Age: OVER 89
End: 2025-07-31

## 2025-07-31 ENCOUNTER — HOSPITAL ENCOUNTER (INPATIENT)
Facility: HOSPITAL | Age: OVER 89
End: 2025-07-31
Attending: EMERGENCY MEDICINE
Payer: COMMERCIAL

## 2025-07-31 VITALS
SYSTOLIC BLOOD PRESSURE: 130 MMHG | DIASTOLIC BLOOD PRESSURE: 71 MMHG | OXYGEN SATURATION: 95 % | TEMPERATURE: 97.9 F | HEIGHT: 66 IN | HEART RATE: 94 BPM | RESPIRATION RATE: 24 BRPM | BODY MASS INDEX: 29.12 KG/M2 | WEIGHT: 181.22 LBS

## 2025-07-31 DIAGNOSIS — E87.6 HYPOKALEMIA: ICD-10-CM

## 2025-07-31 DIAGNOSIS — M48.26 BAASTRUP DISEASE OF LUMBAR SPINE: ICD-10-CM

## 2025-07-31 DIAGNOSIS — I48.91 NEW ONSET ATRIAL FIBRILLATION (H): Primary | ICD-10-CM

## 2025-07-31 DIAGNOSIS — M54.50 ACUTE ON CHRONIC LOW BACK PAIN: ICD-10-CM

## 2025-07-31 DIAGNOSIS — R06.83 SNORING: ICD-10-CM

## 2025-07-31 DIAGNOSIS — M47.816 SPONDYLOSIS OF LUMBAR REGION WITHOUT MYELOPATHY OR RADICULOPATHY: ICD-10-CM

## 2025-07-31 DIAGNOSIS — A02.0 SALMONELLA ENTERITIS: ICD-10-CM

## 2025-07-31 DIAGNOSIS — I48.91 ATRIAL FIBRILLATION WITH RVR (H): ICD-10-CM

## 2025-07-31 DIAGNOSIS — G93.40 ENCEPHALOPATHY, UNSPECIFIED TYPE: ICD-10-CM

## 2025-07-31 DIAGNOSIS — K74.60 CIRRHOSIS OF LIVER WITHOUT ASCITES, UNSPECIFIED HEPATIC CIRRHOSIS TYPE (H): ICD-10-CM

## 2025-07-31 DIAGNOSIS — G89.29 ACUTE ON CHRONIC LOW BACK PAIN: ICD-10-CM

## 2025-07-31 PROBLEM — D69.6 THROMBOCYTOPENIA, UNSPECIFIED: Status: ACTIVE | Noted: 2025-07-31

## 2025-07-31 PROBLEM — M81.0 OSTEOPOROSIS, POSTMENOPAUSAL: Status: ACTIVE | Noted: 2025-07-31

## 2025-07-31 PROBLEM — I49.1 ATRIAL ECTOPY: Status: ACTIVE | Noted: 2024-11-20

## 2025-07-31 PROBLEM — Z85.3 PERSONAL HISTORY OF BREAST CANCER: Status: ACTIVE | Noted: 2025-07-31

## 2025-07-31 PROBLEM — K52.9 CHRONIC COLITIS: Status: ACTIVE | Noted: 2022-03-05

## 2025-07-31 PROBLEM — G40.209: Status: ACTIVE | Noted: 2025-07-31

## 2025-07-31 LAB
ALBUMIN SERPL BCG-MCNC: 3.2 G/DL (ref 3.5–5.2)
ALBUMIN UR-MCNC: NEGATIVE MG/DL
ALP SERPL-CCNC: 72 U/L (ref 40–150)
ALT SERPL W P-5'-P-CCNC: 14 U/L (ref 0–50)
ANION GAP SERPL CALCULATED.3IONS-SCNC: 12 MMOL/L (ref 7–15)
APPEARANCE UR: CLEAR
AST SERPL W P-5'-P-CCNC: 25 U/L (ref 0–45)
BACTERIA #/AREA URNS HPF: ABNORMAL /HPF
BASOPHILS # BLD AUTO: 0 10E3/UL (ref 0–0.2)
BASOPHILS NFR BLD AUTO: 0 %
BILIRUB DIRECT SERPL-MCNC: 0.13 MG/DL (ref 0–0.3)
BILIRUB SERPL-MCNC: 0.3 MG/DL
BILIRUB UR QL STRIP: NEGATIVE
BUN SERPL-MCNC: 30 MG/DL (ref 8–23)
CALCIUM SERPL-MCNC: 7.9 MG/DL (ref 8.8–10.4)
CHLORIDE SERPL-SCNC: 94 MMOL/L (ref 98–107)
COLOR UR AUTO: ABNORMAL
CREAT SERPL-MCNC: 1.29 MG/DL (ref 0.51–0.95)
CRP SERPL-MCNC: 102.3 MG/L
EGFRCR SERPLBLD CKD-EPI 2021: 38 ML/MIN/1.73M2
EOSINOPHIL # BLD AUTO: 0 10E3/UL (ref 0–0.7)
EOSINOPHIL NFR BLD AUTO: 1 %
ERYTHROCYTE [DISTWIDTH] IN BLOOD BY AUTOMATED COUNT: 13 % (ref 10–15)
GLUCOSE SERPL-MCNC: 121 MG/DL (ref 70–99)
GLUCOSE UR STRIP-MCNC: NEGATIVE MG/DL
HCO3 SERPL-SCNC: 25 MMOL/L (ref 22–29)
HCT VFR BLD AUTO: 38.4 % (ref 35–47)
HGB BLD-MCNC: 13.1 G/DL (ref 11.7–15.7)
HGB UR QL STRIP: NEGATIVE
IMM GRANULOCYTES # BLD: 0.1 10E3/UL
IMM GRANULOCYTES NFR BLD: 1 %
INR PPP: 1.11 (ref 0.85–1.15)
KETONES UR STRIP-MCNC: ABNORMAL MG/DL
LEUKOCYTE ESTERASE UR QL STRIP: ABNORMAL
LYMPHOCYTES # BLD AUTO: 0.8 10E3/UL (ref 0.8–5.3)
LYMPHOCYTES NFR BLD AUTO: 9 %
MAGNESIUM SERPL-MCNC: 1.9 MG/DL (ref 1.7–2.3)
MCH RBC QN AUTO: 31.6 PG (ref 26.5–33)
MCHC RBC AUTO-ENTMCNC: 34.1 G/DL (ref 31.5–36.5)
MCV RBC AUTO: 93 FL (ref 78–100)
MONOCYTES # BLD AUTO: 0.8 10E3/UL (ref 0–1.3)
MONOCYTES NFR BLD AUTO: 9 %
MUCOUS THREADS #/AREA URNS LPF: PRESENT /LPF
NEUTROPHILS # BLD AUTO: 7.1 10E3/UL (ref 1.6–8.3)
NEUTROPHILS NFR BLD AUTO: 81 %
NITRATE UR QL: NEGATIVE
NRBC # BLD AUTO: 0 10E3/UL
NRBC BLD AUTO-RTO: 0 /100
NT-PROBNP SERPL-MCNC: 5038 PG/ML (ref 0–624)
PH UR STRIP: 5 [PH] (ref 5–7)
PHOSPHATE SERPL-MCNC: 3.1 MG/DL (ref 2.5–4.5)
PLATELET # BLD AUTO: 101 10E3/UL (ref 150–450)
POTASSIUM SERPL-SCNC: 2.8 MMOL/L (ref 3.4–5.3)
POTASSIUM SERPL-SCNC: 3.6 MMOL/L (ref 3.4–5.3)
PROCALCITONIN SERPL IA-MCNC: 0.34 NG/ML
PROT SERPL-MCNC: 6 G/DL (ref 6.4–8.3)
PROTHROMBIN TIME: 14.7 SECONDS (ref 11.8–14.8)
RBC # BLD AUTO: 4.14 10E6/UL (ref 3.8–5.2)
RBC URINE: 3 /HPF
SODIUM SERPL-SCNC: 131 MMOL/L (ref 135–145)
SP GR UR STRIP: 1.03 (ref 1–1.03)
SQUAMOUS EPITHELIAL: <1 /HPF
TRANSITIONAL EPI: 1 /HPF
TROPONIN T SERPL HS-MCNC: 48 NG/L
TROPONIN T SERPL HS-MCNC: 49 NG/L
TSH SERPL DL<=0.005 MIU/L-ACNC: 2.16 UIU/ML (ref 0.3–4.2)
UROBILINOGEN UR STRIP-MCNC: NORMAL MG/DL
WBC # BLD AUTO: 8.8 10E3/UL (ref 4–11)
WBC URINE: 21 /HPF

## 2025-07-31 PROCEDURE — 96368 THER/DIAG CONCURRENT INF: CPT

## 2025-07-31 PROCEDURE — 36415 COLL VENOUS BLD VENIPUNCTURE: CPT

## 2025-07-31 PROCEDURE — 82435 ASSAY OF BLOOD CHLORIDE: CPT | Performed by: EMERGENCY MEDICINE

## 2025-07-31 PROCEDURE — 96365 THER/PROPH/DIAG IV INF INIT: CPT

## 2025-07-31 PROCEDURE — 250N000011 HC RX IP 250 OP 636

## 2025-07-31 PROCEDURE — 93005 ELECTROCARDIOGRAM TRACING: CPT | Performed by: EMERGENCY MEDICINE

## 2025-07-31 PROCEDURE — 84155 ASSAY OF PROTEIN SERUM: CPT | Performed by: EMERGENCY MEDICINE

## 2025-07-31 PROCEDURE — 258N000003 HC RX IP 258 OP 636

## 2025-07-31 PROCEDURE — 250N000011 HC RX IP 250 OP 636: Performed by: EMERGENCY MEDICINE

## 2025-07-31 PROCEDURE — 85610 PROTHROMBIN TIME: CPT | Performed by: EMERGENCY MEDICINE

## 2025-07-31 PROCEDURE — 86140 C-REACTIVE PROTEIN: CPT | Performed by: EMERGENCY MEDICINE

## 2025-07-31 PROCEDURE — 99223 1ST HOSP IP/OBS HIGH 75: CPT | Mod: FS | Performed by: STUDENT IN AN ORGANIZED HEALTH CARE EDUCATION/TRAINING PROGRAM

## 2025-07-31 PROCEDURE — 80053 COMPREHEN METABOLIC PANEL: CPT | Performed by: EMERGENCY MEDICINE

## 2025-07-31 PROCEDURE — 72128 CT CHEST SPINE W/O DYE: CPT

## 2025-07-31 PROCEDURE — 87633 RESP VIRUS 12-25 TARGETS: CPT

## 2025-07-31 PROCEDURE — 99285 EMERGENCY DEPT VISIT HI MDM: CPT | Mod: 25 | Performed by: EMERGENCY MEDICINE

## 2025-07-31 PROCEDURE — 36415 COLL VENOUS BLD VENIPUNCTURE: CPT | Performed by: EMERGENCY MEDICINE

## 2025-07-31 PROCEDURE — 70450 CT HEAD/BRAIN W/O DYE: CPT

## 2025-07-31 PROCEDURE — 83735 ASSAY OF MAGNESIUM: CPT | Performed by: EMERGENCY MEDICINE

## 2025-07-31 PROCEDURE — 250N000013 HC RX MED GY IP 250 OP 250 PS 637: Performed by: STUDENT IN AN ORGANIZED HEALTH CARE EDUCATION/TRAINING PROGRAM

## 2025-07-31 PROCEDURE — 999N000104 CT LUMBAR SPINE RECONSTRUCTED

## 2025-07-31 PROCEDURE — P9047 ALBUMIN (HUMAN), 25%, 50ML: HCPCS

## 2025-07-31 PROCEDURE — 120N000004 HC R&B MS OVERFLOW

## 2025-07-31 PROCEDURE — 250N000013 HC RX MED GY IP 250 OP 250 PS 637

## 2025-07-31 PROCEDURE — 84443 ASSAY THYROID STIM HORMONE: CPT | Performed by: EMERGENCY MEDICINE

## 2025-07-31 PROCEDURE — 250N000013 HC RX MED GY IP 250 OP 250 PS 637: Performed by: EMERGENCY MEDICINE

## 2025-07-31 PROCEDURE — 84145 PROCALCITONIN (PCT): CPT

## 2025-07-31 PROCEDURE — 85025 COMPLETE CBC W/AUTO DIFF WBC: CPT | Performed by: EMERGENCY MEDICINE

## 2025-07-31 PROCEDURE — 84132 ASSAY OF SERUM POTASSIUM: CPT | Performed by: STUDENT IN AN ORGANIZED HEALTH CARE EDUCATION/TRAINING PROGRAM

## 2025-07-31 PROCEDURE — 84075 ASSAY ALKALINE PHOSPHATASE: CPT | Performed by: EMERGENCY MEDICINE

## 2025-07-31 PROCEDURE — 87086 URINE CULTURE/COLONY COUNT: CPT | Performed by: EMERGENCY MEDICINE

## 2025-07-31 PROCEDURE — 74176 CT ABD & PELVIS W/O CONTRAST: CPT

## 2025-07-31 PROCEDURE — 87040 BLOOD CULTURE FOR BACTERIA: CPT

## 2025-07-31 PROCEDURE — 83880 ASSAY OF NATRIURETIC PEPTIDE: CPT | Performed by: EMERGENCY MEDICINE

## 2025-07-31 PROCEDURE — 81001 URINALYSIS AUTO W/SCOPE: CPT | Performed by: EMERGENCY MEDICINE

## 2025-07-31 PROCEDURE — 99207 PR APP CREDIT; MD BILLING SHARED VISIT: CPT | Mod: FS

## 2025-07-31 PROCEDURE — 84484 ASSAY OF TROPONIN QUANT: CPT

## 2025-07-31 PROCEDURE — 84100 ASSAY OF PHOSPHORUS: CPT | Performed by: STUDENT IN AN ORGANIZED HEALTH CARE EDUCATION/TRAINING PROGRAM

## 2025-07-31 RX ORDER — AMOXICILLIN 250 MG
2 CAPSULE ORAL 2 TIMES DAILY PRN
Status: ACTIVE | OUTPATIENT
Start: 2025-07-31

## 2025-07-31 RX ORDER — COLESTIPOL HYDROCHLORIDE 1 G/1
1 TABLET ORAL 2 TIMES DAILY
Status: DISPENSED | OUTPATIENT
Start: 2025-07-31

## 2025-07-31 RX ORDER — HYDROCHLOROTHIAZIDE 25 MG/1
25 TABLET ORAL EVERY MORNING
Status: ACTIVE | OUTPATIENT
Start: 2025-08-01

## 2025-07-31 RX ORDER — METOPROLOL SUCCINATE 50 MG/1
50 TABLET, EXTENDED RELEASE ORAL EVERY MORNING
Status: DISCONTINUED | OUTPATIENT
Start: 2025-08-01 | End: 2025-07-31

## 2025-07-31 RX ORDER — FENTANYL CITRATE 50 UG/ML
25 INJECTION, SOLUTION INTRAMUSCULAR; INTRAVENOUS EVERY 30 MIN PRN
Status: DISCONTINUED | OUTPATIENT
Start: 2025-07-31 | End: 2025-07-31

## 2025-07-31 RX ORDER — OXYCODONE HYDROCHLORIDE 5 MG/1
5 TABLET ORAL EVERY 4 HOURS PRN
Refills: 0 | Status: ACTIVE | OUTPATIENT
Start: 2025-07-31

## 2025-07-31 RX ORDER — TRAZODONE HYDROCHLORIDE 50 MG/1
100 TABLET ORAL AT BEDTIME
Status: DISPENSED | OUTPATIENT
Start: 2025-07-31

## 2025-07-31 RX ORDER — POLYETHYLENE GLYCOL 3350 17 G/17G
17 POWDER, FOR SOLUTION ORAL 2 TIMES DAILY PRN
Status: ACTIVE | OUTPATIENT
Start: 2025-07-31

## 2025-07-31 RX ORDER — PRAVASTATIN SODIUM 20 MG
40 TABLET ORAL AT BEDTIME
Status: DISPENSED | OUTPATIENT
Start: 2025-07-31

## 2025-07-31 RX ORDER — NALOXONE HYDROCHLORIDE 0.4 MG/ML
0.2 INJECTION, SOLUTION INTRAMUSCULAR; INTRAVENOUS; SUBCUTANEOUS
Status: ACTIVE | OUTPATIENT
Start: 2025-07-31

## 2025-07-31 RX ORDER — AMLODIPINE BESYLATE 5 MG/1
10 TABLET ORAL EVERY EVENING
Status: ACTIVE | OUTPATIENT
Start: 2025-07-31

## 2025-07-31 RX ORDER — LOSARTAN POTASSIUM 50 MG/1
100 TABLET ORAL EVERY MORNING
Status: ACTIVE | OUTPATIENT
Start: 2025-08-01

## 2025-07-31 RX ORDER — NALOXONE HYDROCHLORIDE 0.4 MG/ML
0.4 INJECTION, SOLUTION INTRAMUSCULAR; INTRAVENOUS; SUBCUTANEOUS
Status: ACTIVE | OUTPATIENT
Start: 2025-07-31

## 2025-07-31 RX ORDER — ONDANSETRON 2 MG/ML
4 INJECTION INTRAMUSCULAR; INTRAVENOUS EVERY 6 HOURS PRN
Status: ACTIVE | OUTPATIENT
Start: 2025-07-31

## 2025-07-31 RX ORDER — POTASSIUM CHLORIDE 1.5 G/1.58G
40 POWDER, FOR SOLUTION ORAL ONCE
Status: COMPLETED | OUTPATIENT
Start: 2025-07-31 | End: 2025-07-31

## 2025-07-31 RX ORDER — LIDOCAINE 4 G/G
1 PATCH TOPICAL DAILY PRN
Status: ACTIVE | OUTPATIENT
Start: 2025-07-31

## 2025-07-31 RX ORDER — HYDROCHLOROTHIAZIDE 25 MG/1
25 TABLET ORAL EVERY MORNING
Status: ON HOLD | COMMUNITY

## 2025-07-31 RX ORDER — METOPROLOL SUCCINATE 25 MG/1
2 TABLET, EXTENDED RELEASE ORAL EVERY MORNING
Status: ON HOLD | COMMUNITY
Start: 2024-12-10

## 2025-07-31 RX ORDER — PROCHLORPERAZINE MALEATE 5 MG/1
5 TABLET ORAL EVERY 6 HOURS PRN
Status: ACTIVE | OUTPATIENT
Start: 2025-07-31

## 2025-07-31 RX ORDER — LIDOCAINE 40 MG/G
CREAM TOPICAL
Status: ACTIVE | OUTPATIENT
Start: 2025-07-31

## 2025-07-31 RX ORDER — PHENYTOIN SODIUM 100 MG/1
100 CAPSULE, EXTENDED RELEASE ORAL EVERY EVENING
Status: DISPENSED | OUTPATIENT
Start: 2025-07-31

## 2025-07-31 RX ORDER — AMOXICILLIN 250 MG
1 CAPSULE ORAL 2 TIMES DAILY PRN
Status: ACTIVE | OUTPATIENT
Start: 2025-07-31

## 2025-07-31 RX ORDER — ALBUMIN (HUMAN) 12.5 G/50ML
50 SOLUTION INTRAVENOUS ONCE
Status: COMPLETED | OUTPATIENT
Start: 2025-07-31 | End: 2025-07-31

## 2025-07-31 RX ORDER — POTASSIUM CHLORIDE 7.45 MG/ML
10 INJECTION INTRAVENOUS ONCE
Status: COMPLETED | OUTPATIENT
Start: 2025-07-31 | End: 2025-07-31

## 2025-07-31 RX ORDER — METOPROLOL SUCCINATE 25 MG/1
25 TABLET, EXTENDED RELEASE ORAL ONCE
Status: COMPLETED | OUTPATIENT
Start: 2025-07-31 | End: 2025-07-31

## 2025-07-31 RX ORDER — METOPROLOL TARTRATE 1 MG/ML
5 INJECTION, SOLUTION INTRAVENOUS EVERY 5 MIN PRN
Status: ACTIVE | OUTPATIENT
Start: 2025-07-31

## 2025-07-31 RX ORDER — ONDANSETRON 4 MG/1
4 TABLET, ORALLY DISINTEGRATING ORAL EVERY 6 HOURS PRN
Status: ACTIVE | OUTPATIENT
Start: 2025-07-31

## 2025-07-31 RX ORDER — CALCIUM CARBONATE 500 MG/1
1000 TABLET, CHEWABLE ORAL 4 TIMES DAILY PRN
Status: ACTIVE | OUTPATIENT
Start: 2025-07-31

## 2025-07-31 RX ORDER — ACETAMINOPHEN 325 MG/1
975 TABLET ORAL 3 TIMES DAILY
Status: DISPENSED | OUTPATIENT
Start: 2025-07-31

## 2025-07-31 RX ORDER — SODIUM CHLORIDE 9 MG/ML
INJECTION, SOLUTION INTRAVENOUS CONTINUOUS
Status: ACTIVE | OUTPATIENT
Start: 2025-07-31 | End: 2025-08-01

## 2025-07-31 RX ORDER — MAGNESIUM SULFATE HEPTAHYDRATE 40 MG/ML
2 INJECTION, SOLUTION INTRAVENOUS ONCE
Status: COMPLETED | OUTPATIENT
Start: 2025-07-31 | End: 2025-07-31

## 2025-07-31 RX ORDER — POTASSIUM CHLORIDE 750 MG/1
10 TABLET, EXTENDED RELEASE ORAL ONCE
Status: COMPLETED | OUTPATIENT
Start: 2025-07-31 | End: 2025-07-31

## 2025-07-31 RX ADMIN — ACETAMINOPHEN 975 MG: 325 TABLET ORAL at 20:28

## 2025-07-31 RX ADMIN — ALBUMIN HUMAN 50 G: 0.25 SOLUTION INTRAVENOUS at 20:22

## 2025-07-31 RX ADMIN — POTASSIUM CHLORIDE 10 MEQ: 750 TABLET, EXTENDED RELEASE ORAL at 22:59

## 2025-07-31 RX ADMIN — METOPROLOL SUCCINATE 25 MG: 25 TABLET, FILM COATED, EXTENDED RELEASE ORAL at 20:28

## 2025-07-31 RX ADMIN — MONTELUKAST SODIUM 1 G: 4 TABLET, CHEWABLE ORAL at 21:47

## 2025-07-31 RX ADMIN — PRAVASTATIN SODIUM 40 MG: 20 TABLET ORAL at 21:46

## 2025-07-31 RX ADMIN — SODIUM CHLORIDE: 0.9 INJECTION, SOLUTION INTRAVENOUS at 20:36

## 2025-07-31 RX ADMIN — PHENYTOIN SODIUM 100 MG: 100 CAPSULE, EXTENDED RELEASE ORAL at 21:46

## 2025-07-31 RX ADMIN — POTASSIUM CHLORIDE 40 MEQ: 1.5 POWDER, FOR SOLUTION ORAL at 15:27

## 2025-07-31 RX ADMIN — MAGNESIUM SULFATE HEPTAHYDRATE 2 G: 2 INJECTION, SOLUTION INTRAVENOUS at 15:31

## 2025-07-31 RX ADMIN — FENTANYL CITRATE 25 MCG: 50 INJECTION INTRAMUSCULAR; INTRAVENOUS at 16:22

## 2025-07-31 RX ADMIN — TRAZODONE HYDROCHLORIDE 100 MG: 50 TABLET ORAL at 21:46

## 2025-07-31 RX ADMIN — POTASSIUM CHLORIDE 10 MEQ: 7.46 INJECTION, SOLUTION INTRAVENOUS at 15:28

## 2025-07-31 ASSESSMENT — ACTIVITIES OF DAILY LIVING (ADL)
ADLS_ACUITY_SCORE: 51
ADLS_ACUITY_SCORE: 53
ADLS_ACUITY_SCORE: 51

## 2025-07-31 ASSESSMENT — COLUMBIA-SUICIDE SEVERITY RATING SCALE - C-SSRS
1. IN THE PAST MONTH, HAVE YOU WISHED YOU WERE DEAD OR WISHED YOU COULD GO TO SLEEP AND NOT WAKE UP?: NO
2. HAVE YOU ACTUALLY HAD ANY THOUGHTS OF KILLING YOURSELF IN THE PAST MONTH?: NO
6. HAVE YOU EVER DONE ANYTHING, STARTED TO DO ANYTHING, OR PREPARED TO DO ANYTHING TO END YOUR LIFE?: NO

## 2025-07-31 NOTE — ED PROVIDER NOTES
EMERGENCY DEPARTMENT ENCOUNTER      NAME: Chavez Tavares  AGE: 89 year old female  YOB: 1931  MRN: [id number]  EVALUATION DATE & TIME: 7/31/2025  1:56 PM    PCP: Silvia Jama    ED PROVIDER: Robert Hoyt M.D.      No chief complaint on file.        IMPRESSION  1. New onset atrial fibrillation (H)    2. Atrial fibrillation with RVR (H)    3. Acute on chronic low back pain    4. Cirrhosis of liver without ascites, unspecified hepatic cirrhosis type (H)    5. Hypokalemia        PLAN  - admit to hospitalist for further care; cards tele admit    ED COURSE & MEDICAL DECISION MAKING    ED Course as of 07/31/25 1631   Thu Jul 31, 2025   1626 89yoF with history of atrial ectopy, no prior atrial fibrillation, unspecified cirrhosis, HTN, HLD presenting per EMS from [facility] after being found there to be in new-onset a-fib with RVR with new dyspnea the past several days; CT chest with no PE or pulmonary edema---does have mild pleural effusions. Given 1L IVF prior to arrival. Here in the ED, reports ongoing fatigue; notes a couple falls the past couple days due to weakness. No wound or bleeding. Notes her chronic back pain has been worse since the falls.    In a-fib with RVR with rates to 120s on presentation with BP 90s/60s; otherwise normal vitals. Has clear lungs, normal work of breathing, benign abdomen, increased back pain with movement attempts in bed but no reproducible back tenderness, otherwise atraumatic exam, normal neuro exam.    K low at 2.8 with mag 1.9; replaced and HR improved to 90s with SBP 110s after this. BNP high; suspect a-fib causing some cardiomyopathy for her---not hypoxic though. Labs otherwise unremarkable.    CT scans obtained given back pain after falls; no acute injuries found. Chronic compression fractures noted.    Stable for floor admission; HR 90s at this time with SBP 110s. Consulted hospitalist for admission; they agreed. Patient understood and agreed with the plan; no  further questions at the time of admission.    High-sensitivity troponin still pending at time of admission; doubt primary ACS and suspect may need trending given her a-fib with RVR with likely demand ischemia.       --------------------------------------------------------------------------------   --------------------------------------------------------------------------------   ED course timestamps entered by medical scribe:  2:15 PM I met with the patient for the initial interview and physical examination. Discussed plan for treatment and workup in the ED.  4:17 PM I spoke with the hospitalist, Dr. Gondal. We discussed the patient's case and they agree to admit the patient.     --------------------------------------------------------------------------------  --------------------------------------------------------------------------------   This patient involved a high degree of complexity in medical decision making, as significant risks were present and assessed. Recent encounters & results in medical record reviewed by me.    All workup (i.e. any EKG/labs/imaging as per charting below) reviewed and independently interpreted by me. See respective sections for details.        See additional MDM below if interested.      MEDICATIONS GIVEN IN THE EMERGENCY DEPARTMENT  Medications   fentaNYL (PF) (SUBLIMAZE) injection 25 mcg (25 mcg Intravenous $Given 7/31/25 1622)   magnesium sulfate 2 g in 50 mL sterile water intermittent infusion (2 g Intravenous $New Bag 7/31/25 1531)   potassium chloride (KLOR-CON) Packet 40 mEq (40 mEq Oral $Given 7/31/25 1527)   potassium chloride 10 mEq in 100 mL sterile water infusion (10 mEq Intravenous $New Bag 7/31/25 1528)               =================================================================      HPI  Use of : N/A      Chavez Tavares is a 89 year old female with a pertinent history of HTN, HLD, epilepsy,  who presents to this ED from urgent care by ambulance for  "evaluation of shortness of breath.    Per chart review, the patient was seen at the [facility] [place] prior to ED presentation. The patient had initially presented to urgent care after she had a fall 2 days ago, hit head bu no loss of consciousness, not on thinners, and was having worsening pain in her mid back. Also reported several months of worsening shortness of breath. EKG showed new onset atrial fibrillation. BNP 7,890, Troponin 0.128, D-dimer 3.24, Creatinine 1.5 up fro baseline of 0.8. Sodium 129 and Potassium 2.8. CT PE study negative for pulmonary embolism but showed trace bilateral pleural effusions with bibasilar atelectasis. Patient given IV fluids and oral potassium prior to transfer to the ED for further workup of newly diagnosed Afib.    Here in the ED the patient reports she feels fine when she is laying still but has intense lower back pain when she tries to move. She has been having the pain since she fell a couple days ago. She also adds that she has been short of breath \"recently.\" She denies any chest pain or palpitations. She has had some diarrhea recently but has not had any recent cough, cold symptoms, or vomiting. She currently lives alone.      --------------- MEDICAL HISTORY ---------------  PAST MEDICAL HISTORY:  Reviewed independently by me.  Past Medical History:   Diagnosis Date    Chronic colitis     Epilepsy (H)     Hypertension     Mixed hyperlipidemia      Patient Active Problem List   Diagnosis    Near syncope    Atrial ectopy    Chronic colitis    Cryptogenic partial complex epilepsy (H)    Hearing loss    Hyperglycemia    Osteoporosis, postmenopausal    Peripheral neuropathy    Personal history of breast cancer    Precordial pain    Primary insomnia    Pure hypercholesterolemia    Thrombocytopenia, unspecified    New onset atrial fibrillation (H)       PAST SURGICAL HISTORY:  Reviewed independently by me.  Past Surgical History:   Procedure Laterality Date    " COLONOSCOPY N/A 7/7/2022    Procedure: COLONOSCOPY;  Surgeon: Vincent Donnelly MD;  Location: [facility] [place] OR    ENT SURGERY      ESOPHAGOSCOPY, GASTROSCOPY, DUODENOSCOPY (EGD), COMBINED N/A 7/7/2022    Procedure: ESOPHAGOGASTRODUODENOSCOPY WITH;  Surgeon: Vincent Donnelly MD;  Location: [facility] [place] OR    LUMPECTOMY BREAST Right 01/01/2005       CURRENT MEDICATIONS:    Reviewed independently by me.    Current Facility-Administered Medications:     fentaNYL (PF) (SUBLIMAZE) injection 25 mcg, 25 mcg, Intravenous, Q30 Min PRN, Robert Hoyt MD, 25 mcg at 07/31/25 1622    Current Outpatient Medications:     amLODIPine (NORVASC) 10 MG tablet, Take 10 mg by mouth every evening., Disp: , Rfl:     calcium carbonate (TUMS) 500 MG chewable tablet, Take 1 chew tab by mouth as needed for heartburn., Disp: , Rfl:     Calcium-Magnesium-Vitamin D (CALCIUM 1200+D3 PO), Take 1 tablet by mouth every morning., Disp: , Rfl:     colestipol (COLESTID) 1 g tablet, Take 1 g by mouth 2 times daily., Disp: , Rfl:     hydrochlorothiazide (HYDRODIURIL) 25 MG tablet, Take 25 mg by mouth every morning., Disp: , Rfl:     losartan (COZAAR) 100 MG tablet, Take 100 mg by mouth every morning., Disp: , Rfl:     metoprolol succinate ER (TOPROL XL) 25 MG 24 hr tablet, Take 2 tablets by mouth every morning., Disp: , Rfl:     phenytoin (DILANTIN) 100 MG ER capsule, Take 100 mg by mouth every evening., Disp: , Rfl:     pravastatin (PRAVACHOL) 40 MG tablet, [PRAVASTATIN (PRAVACHOL) 40 MG TABLET] Take 40 mg by mouth bedtime., Disp: , Rfl:     traZODone (DESYREL) 50 MG tablet, Take 2 tablets by mouth at bedtime., Disp: , Rfl:     ALLERGIES:  Reviewed independently by me.  Allergies   Allergen Reactions    Lisinopril Cough    Simvastatin Muscle Pain (Myalgia)    Carbamazepine Rash       FAMILY HISTORY:  Reviewed independently by me.  Family History   Problem Relation Age of Onset    Coronary Artery Disease Mother     Coronary Artery  "Disease Father          SOCIAL HISTORY:   Reviewed independently by me.  Social History     Socioeconomic History    Marital status:    Tobacco Use    Smoking status: Former     Current packs/day: 0.00     Types: Cigarettes     Quit date: 2000     Years since quittin.6     Social Drivers of Health     Financial Resource Strain: Low Risk  (2024)    Financial Resource Strain     Within the past 12 months, have you or your family members you live with been unable to get utilities (heat, electricity) when it was really needed?: No   Food Insecurity: Low Risk  (2024)    Food Insecurity     Within the past 12 months, did you worry that your food would run out before you got money to buy more?: No     Within the past 12 months, did the food you bought just not last and you didn t have money to get more?: No   Transportation Needs: Low Risk  (2024)    Transportation Needs     Within the past 12 months, has lack of transportation kept you from medical appointments, getting your medicines, non-medical meetings or appointments, work, or from getting things that you need?: No   Social Connections: Socially Integrated (2023)    Received from [facility]    Social Connections     Do you often feel lonely or isolated from those around you?: 0   Housing Stability: Low Risk  (2024)    Housing Stability     Do you have housing? : Yes     Are you worried about losing your housing?: No       --------------- PHYSICAL EXAM ---------------  Nursing notes and vitals independently reviewed by me.  VITALS:  Vitals:    25 1400   BP: 114/57   Pulse: 111   Resp: 18   Temp: 97.5  F (36.4  C)   TempSrc: Oral   SpO2: 93%   Weight: 74.8 kg (165 lb)   Height: 1.676 m (5' 6\")       PHYSICAL EXAM:    General:  alert, interactive, no distress  Eyes:  conjunctivae clear, conjugate gaze  HENT:  atraumatic, no raccoon eyes or phelps sign, nose with no rhinorrhea, oropharynx " clear  Neck:  no meningismus, no c-spine tenderness with painless active ROM intact  Cardiovascular:  HR 100s-110s during exam, irregular rhythm, no murmurs, brisk cap refill  Chest:  no chest wall tenderness  Pulmonary:  no stridor, normal phonation, normal work of breathing, clear lungs bilaterally  Abdomen:  soft, nondistended, nontender  :  no CVA tenderness  Back:  no midline or paraspinal tenderness but does complain of notable lumbar pain with movement attempts, negative straight leg raise bilaterally, CMS intact to BLE  Musculoskeletal:  no pretibial edema, no calf tenderness. Gross ROM intact to joints of extremities with no obvious deformities.  Skin:  warm, dry, no rash  Neuro:  awake, alert, answers questions appropriately, follows commands, moves all limbs  Psych:  calm, normal affect      --------------- RESULTS ---------------  EKG:    Reviewed and independently interpreted by me.  - a-fib with rate 100bpm, no ST changes, unchanged LBBB with , QTc 461  - a-fib replaced NSR with PACs from prior on 11/1/24  My read.    LAB:  Reviewed and independently interpreted by me.  Results for orders placed or performed during the hospital encounter of 07/31/25   CT Thoracic Spine w/o Contrast    Impression    IMPRESSION:  THORACIC SPINE CT:  1.  No acute fracture or posttraumatic subluxation.  2.  Unchanged chronic compression fracture deformity of the T7 vertebral body.  3.  No high-grade spinal canal or neural foraminal stenosis.  4.  Multilevel spondylosis.    LUMBAR SPINE CT:  1.  No acute fracture or posttraumatic subluxation.  2.  No high-grade spinal canal or neural foraminal stenosis.  3.  Multilevel spondylosis.  4.  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     CT Abdomen Pelvis w/o Contrast    Impression    IMPRESSION:     1.  Cirrhosis. Stable benign liver cyst inferior right lobe.  2.  Hazy attenuation within the central mesentery consistent with mesenteric  congestion.  3.  Few distal colonic diverticula but no acute bowel inflammation or mechanical obstruction.     CT Lumbar Spine Reconstructed    Impression    IMPRESSION:  THORACIC SPINE CT:  1.  No acute fracture or posttraumatic subluxation.  2.  Unchanged chronic compression fracture deformity of the T7 vertebral body.  3.  No high-grade spinal canal or neural foraminal stenosis.  4.  Multilevel spondylosis.    LUMBAR SPINE CT:  1.  No acute fracture or posttraumatic subluxation.  2.  No high-grade spinal canal or neural foraminal stenosis.  3.  Multilevel spondylosis.  4.  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     INR   Result Value Ref Range    INR 1.11 0.85 - 1.15    PT 14.7 11.8 - 14.8 Seconds   Basic Metabolic Panel (Limited Occurrences)   Result Value Ref Range    Sodium 131 (L) 135 - 145 mmol/L    Potassium 2.8 (L) 3.4 - 5.3 mmol/L    Chloride 94 (L) 98 - 107 mmol/L    Carbon Dioxide (CO2) 25 22 - 29 mmol/L    Anion Gap 12 7 - 15 mmol/L    Urea Nitrogen 30.0 (H) 8.0 - 23.0 mg/dL    Creatinine 1.29 (H) 0.51 - 0.95 mg/dL    GFR Estimate 38 (L) >60 mL/min/1.73m2    Calcium 7.9 (L) 8.8 - 10.4 mg/dL    Glucose 121 (H) 70 - 99 mg/dL   Hepatic Function Panel (Limited Occurrences)   Result Value Ref Range    Protein Total 6.0 (L) 6.4 - 8.3 g/dL    Albumin 3.2 (L) 3.5 - 5.2 g/dL    Bilirubin Total 0.3 <=1.2 mg/dL    Alkaline Phosphatase 72 40 - 150 U/L    AST 25 0 - 45 U/L    ALT 14 0 - 50 U/L    Bilirubin Direct 0.13 0.00 - 0.30 mg/dL   Magnesium (Limited Occurrences)   Result Value Ref Range    Magnesium 1.9 1.7 - 2.3 mg/dL   TSH with free T4 reflex   Result Value Ref Range    TSH 2.16 0.30 - 4.20 uIU/mL   Result Value Ref Range    CRP Inflammation 102.30 (H) <5.00 mg/L   NT-proBNP   Result Value Ref Range    NT-proBNP 5,038 (H) 0 - 624 pg/mL   CBC with platelets and differential   Result Value Ref Range    WBC Count 8.8 4.0 - 11.0 10e3/uL    RBC Count 4.14 3.80 - 5.20 10e6/uL    Hemoglobin  13.1 11.7 - 15.7 g/dL    Hematocrit 38.4 35.0 - 47.0 %    MCV 93 78 - 100 fL    MCH 31.6 26.5 - 33.0 pg    MCHC 34.1 31.5 - 36.5 g/dL    RDW 13.0 10.0 - 15.0 %    Platelet Count 101 (L) 150 - 450 10e3/uL    % Neutrophils 81 %    % Lymphocytes 9 %    % Monocytes 9 %    % Eosinophils 1 %    % Basophils 0 %    % Immature Granulocytes 1 %    NRBCs per 100 WBC 0 <1 /100    Absolute Neutrophils 7.1 1.6 - 8.3 10e3/uL    Absolute Lymphocytes 0.8 0.8 - 5.3 10e3/uL    Absolute Monocytes 0.8 0.0 - 1.3 10e3/uL    Absolute Eosinophils 0.0 0.0 - 0.7 10e3/uL    Absolute Basophils 0.0 0.0 - 0.2 10e3/uL    Absolute Immature Granulocytes 0.1 <=0.4 10e3/uL    Absolute NRBCs 0.0 10e3/uL         RADIOLOGY:  Reviewed and independently interpreted by me. Please see official radiology report.  Recent Results (from the past 24 hours)   CTA Chest Redo Sternotomy Planning    Narrative    For Patients: As a result of the 21st Century Cures Act, medical imaging exams and procedure reports are released immediately into your electronic medical record. You may view this report before your referring provider. If you have questions, please contact your health care provider.    EXAM: CTA CHEST  LOCATION: [facility] [place]  DATE: 7/31/2025    INDICATION: positive dimer, shortness of breath  COMPARISON: 11/1/2024  TECHNIQUE: CT chest pulmonary angiogram during arterial phase injection of IV contrast. Multiplanar reformats and MIP reconstructions were performed. Dose reduction techniques were used.   CONTRAST: IOPAMIDOL 300 MG/ML  ML BOTTLE: 50mL    FINDINGS:  ANGIOGRAM CHEST: Pulmonary arteries are normal caliber and negative for pulmonary emboli. Thoracic aorta is negative for dissection. No CT evidence of right heart strain.    LUNGS AND PLEURA: Trace bilateral effusions with bibasilar atelectasis. Small linear areas of atelectasis and scarring are noted bilaterally. Biapical pleural-parenchymal scarring.    MEDIASTINUM/AXILLAE:  Heart is moderately enlarged. Subcentimeter mediastinal lymph nodes are noted. Mildly prominent right hilar lymph node measuring 1.1 cm short axis.    CORONARY ARTERY CALCIFICATION: Moderate.    UPPER ABDOMEN: Cirrhotic liver. Partially visualized gallbladder is distended.    MUSCULOSKELETAL: Degenerative changes of the spine.    Impression    1.  No pulmonary embolism.  2.  Trace bilateral effusions with bibasilar atelectasis.  3.  Cirrhotic liver.  4.  Stable mildly prominent right hilar lymph nodes which is nonspecific.   CT Thoracic Spine w/o Contrast    Narrative    EXAM: CT THORACIC SPINE W/O CONTRAST, CT LUMBAR SPINE RECONSTRUCTED  LOCATION: [facility]  DATE: 7/31/2025    INDICATION: fall, back pain  COMPARISON: Lumbar spine CT dated 10/23/2024. Chest CT dated 08/08/2024.  TECHNIQUE:  1) Routine CT Thoracic Spine without IV contrast. Multiplanar reformats. Dose reduction techniques were used.   2) Routine CT Lumbar Spine without IV contrast. Multiplanar reformats. Dose reduction techniques were used.     FINDINGS:    THORACIC SPINE CT:  VERTEBRA: Diffuse osteopenia. Unchanged moderate anterior superior endplate height loss of the T7 vertebral body. The remaining vertebral body heights are maintained. Minimal retrolisthesis at T7-T8 is unchanged. Disc height loss, degenerative endplate   changes and anterior osteophytes throughout the thoracic spine. No acute fracture or posttraumatic subluxation.     CANAL/FORAMINA: Small disc osteophyte complexes at multiple levels. No high-grade spinal canal or neural foraminal stenosis.    PARASPINAL: Trace bilateral pleural effusions. Biapical scarring. Bilateral dependent atelectasis. Coronary calcifications. Atherosclerotic calcifications of the aortic arch and great vessels.    LUMBAR SPINE CT:  VERTEBRA: Vertebral body heights are maintained. Mild stepwise retrolisthesis from L1-L4 is unchanged and likely degenerative in nature. No acute  fracture or posttraumatic subluxation.     CANAL/FORAMINA: Disc osteophyte complexes contribute to mild spinal canal narrowing at L2-L3, L3-L4 and L4-L5. Multilevel facet arthropathy, most pronounced at L4-L5 and L5-S1. Mild neural foraminal stenosis at multiple levels.    PARASPINAL: No acute extraspinal abnormality. Degenerative changes of the apposing surfaces of the spinous processes at L3-L4 and L4-L5 is suggestive of Baastrup's disease. Degenerative changes of the sacroiliac joints. Atrophy of the paravertebral   muscles. Atherosclerotic calcifications of the abdominal aorta and branching vessels.      Impression    IMPRESSION:  THORACIC SPINE CT:  1.  No acute fracture or posttraumatic subluxation.  2.  Unchanged chronic compression fracture deformity of the T7 vertebral body.  3.  No high-grade spinal canal or neural foraminal stenosis.  4.  Multilevel spondylosis.    LUMBAR SPINE CT:  1.  No acute fracture or posttraumatic subluxation.  2.  No high-grade spinal canal or neural foraminal stenosis.  3.  Multilevel spondylosis.  4.  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     CT Abdomen Pelvis w/o Contrast    Narrative    EXAM: CT ABDOMEN PELVIS W/O CONTRAST  LOCATION: [facility]  DATE: 7/31/2025    INDICATION: fall, pain, new cirrhosis  COMPARISON: CT angiogram of the chest, earlier today; MRI of the abdomen 3/3/2022  TECHNIQUE: CT scan of the abdomen and pelvis was performed without IV contrast. Multiplanar reformats were obtained. Dose reduction techniques were used.  CONTRAST: None.    FINDINGS:   LOWER CHEST: Moderate coronary calcifications and calcification of the aortic root. Trace pleural fluid.    HEPATOBILIARY: Liver is small with diffuse nodular contour consistent with cirrhosis. There is a circumscribed 2.6 cm cyst along the inferior right liver capsule (series 3, image 37). The gallbladder is distended. No gallbladder wall thickening or    pericholecystic inflammation. No bile duct enlargement.    PANCREAS: Normal.    SPLEEN: Normal.    ADRENAL GLANDS: Normal.    KIDNEYS/BLADDER: Bilateral parapelvic renal cysts are present and do not require imaging workup or follow-up. Excreted contrast is present in nondilated collecting systems and the upper ureters. No nephrolithiasis or hydronephrosis. There is a urine   contrast level in the urinary bladder. No bladder wall thickening or filling defects.    BOWEL: There are a few sigmoid diverticula. No dilated bowel or bowel wall thickening. There is a 13 x 23 mm fat attenuation lesion within the wall of the central small bowel (series 4, image 126) consistent with a benign lipoma. No peritoneal thickening   or ascites.    LYMPH NODES: There is hazy attenuation within the central mesentery suggesting mesenteric congestion. No enlarged lymph nodes in the abdomen or pelvis.    VASCULATURE: Severe atheromatous calcification of the abdominal aorta with lesser involvement of the iliac and common femoral arteries. No aneurysm.    PELVIC ORGANS: Normal.    MUSCULOSKELETAL: Generalized bone demineralization. Small to moderate degenerative osteophytes and related disc space narrowing. Mild lumbar facet arthropathy. No aggressive or destructive bone lesions.      Impression    IMPRESSION:     1.  Cirrhosis. Stable benign liver cyst inferior right lobe.  2.  Hazy attenuation within the central mesentery consistent with mesenteric congestion.  3.  Few distal colonic diverticula but no acute bowel inflammation or mechanical obstruction.     CT Lumbar Spine Reconstructed    Narrative    EXAM: CT THORACIC SPINE W/O CONTRAST, CT LUMBAR SPINE RECONSTRUCTED  LOCATION: [facility]  DATE: 7/31/2025    INDICATION: fall, back pain  COMPARISON: Lumbar spine CT dated 10/23/2024. Chest CT dated 08/08/2024.  TECHNIQUE:  1) Routine CT Thoracic Spine without IV contrast. Multiplanar reformats. Dose reduction  techniques were used.   2) Routine CT Lumbar Spine without IV contrast. Multiplanar reformats. Dose reduction techniques were used.     FINDINGS:    THORACIC SPINE CT:  VERTEBRA: Diffuse osteopenia. Unchanged moderate anterior superior endplate height loss of the T7 vertebral body. The remaining vertebral body heights are maintained. Minimal retrolisthesis at T7-T8 is unchanged. Disc height loss, degenerative endplate   changes and anterior osteophytes throughout the thoracic spine. No acute fracture or posttraumatic subluxation.     CANAL/FORAMINA: Small disc osteophyte complexes at multiple levels. No high-grade spinal canal or neural foraminal stenosis.    PARASPINAL: Trace bilateral pleural effusions. Biapical scarring. Bilateral dependent atelectasis. Coronary calcifications. Atherosclerotic calcifications of the aortic arch and great vessels.    LUMBAR SPINE CT:  VERTEBRA: Vertebral body heights are maintained. Mild stepwise retrolisthesis from L1-L4 is unchanged and likely degenerative in nature. No acute fracture or posttraumatic subluxation.     CANAL/FORAMINA: Disc osteophyte complexes contribute to mild spinal canal narrowing at L2-L3, L3-L4 and L4-L5. Multilevel facet arthropathy, most pronounced at L4-L5 and L5-S1. Mild neural foraminal stenosis at multiple levels.    PARASPINAL: No acute extraspinal abnormality. Degenerative changes of the apposing surfaces of the spinous processes at L3-L4 and L4-L5 is suggestive of Baastrup's disease. Degenerative changes of the sacroiliac joints. Atrophy of the paravertebral   muscles. Atherosclerotic calcifications of the abdominal aorta and branching vessels.      Impression    IMPRESSION:  THORACIC SPINE CT:  1.  No acute fracture or posttraumatic subluxation.  2.  Unchanged chronic compression fracture deformity of the T7 vertebral body.  3.  No high-grade spinal canal or neural foraminal stenosis.  4.  Multilevel spondylosis.    LUMBAR SPINE CT:  1.  No acute  fracture or posttraumatic subluxation.  2.  No high-grade spinal canal or neural foraminal stenosis.  3.  Multilevel spondylosis.  4.  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           PROCEDURES:   Procedures   --------------------------------------------------------------------------------   Cardiac telemetry monitoring ordered by me secondary to the patient's history of shortness of breath and to monitor the patient for dysrhythmia. Reviewed & independently interpreted by me. Revealed a-fib with rates from 120s-90s.  --------------------------------------------------------------------------------                     --------------- ADDITIONAL MDM ---------------  Severe Sepsis/Septic Shock/STEMI/Stroke Measures:  None    MIPS (CTPE, dental pain, Harris, sinusitis, asthma/COPD, head trauma):  Not Applicable    History:  - I considered systemic symptoms of the presenting illness.  - Supplemental history from:       -- patient, family, EMS  - External Record(s) reviewed:       -- Inpatient/outpatient record, prior labs, prior imaging       -- see above ED course & MDM for further details    Workup:  - Chart documentation above includes differential considered and my independent interpretation any EKGs, labs tests, and/or imaging  - emergent/severe conditions considered and evaluated for: see above differential & MDM  - In additional to work up documented, I considered the following work up:       -- see above ED course & MDM for further details    Independent Interpretation:  - Independent interpretation of ECG and images noted in documentation, when applicable.    External Consultation:  - Discussion of management with another provider:       -- see above ED course & MDM for details    Complicating Factors:  - Care impacted by chronic illness:       -- see above MDM, past medical history, & problem list    Disposition Considerations:  - Admit               I, Keyur Maldonado, am serving as a  scribe to document services personally performed by Dr. Robert Hoyt based on my observation and the provider's statements to me. I, Robert Hoyt MD attest that Keyur Maldonado is acting in a scribe capacity, has observed my performance of the services and has documented them in accordance with my direction.      Robert Hoyt MD  07/31/25  Emergency Medicine  [facility] EMERGENCY DEPARTMENT  [address] [place] [id number]  [phone]  Dept: [phone]     Robert Hoyt MD  07/31/25 5619

## 2025-07-31 NOTE — H&P
[facility]    History and Physical - Hospitalist Service       Date of Admission:  7/31/2025    Assessment & Plan    Chavez Tavares is a 89 year old female with PMHx of HTN, seizure disorder, DLD, PACs, LBBB who was admitted on 7/31/2025. She initially presented to ER for evaluation of back pain and SOB, sent to ER for further evaluation and management of new A fib with RVR     Afib with RVR   Elevated Troponin   This is a NEW diagnosis for patient. Previously diagnosed with frequent PACs but no known history of Afib  -- EKG atrial fib with LBBB (known since 2024)  -- Trop 49 ; 48  -- Add Toprol 25 mg now and increase PTA Toprol to 75 mg daily   -- PRN IV Lopressor for HR > 110  -- HOLD PTA amlodipine given softer pressures at admit   -- Cardiac monitoring   -- Cardiology consult   -- Electrolyte replacement protocols   -- CHADS score of at least 4 - hold on DOAC until cards consult in the AM    Elevated BNP  Endorses feeling more SOB over the past several weeks but worsening over the past few days (although notes improvement today)  On exam appears appears dry, no hypoxia. Signs of dehydration with hyponatremia, elevated BUN and LUCI on labs  -- Echo 11/2024 with EF 60-65%, moderate concentric LVH  -- CT chest showing trace bilateral pleural effusions   -- S/p 1L NS at urgency room   Plan   -- Update echo ordered   -- Cardiology consult   -- Will give x 1 dose of albumin and gentle fluids tonight   -- Pulseox ; monitor for development of hypoxia   -- Daily weights ; I+Os   -- Hold off on diuresis for now     LUCI   -- Cr 1.29 ; baseline appears to be around 1   -- S/p IVF at , gentle fluids overnight   -- Hold PTA losartan and hydrochlorothiazide   -- Trend BMP     Hyponatremia   Hypochloremia   Na 131, Cl 94, BUN 30  -- Endorses poor PO intake over the past several days   -- As above, S/p 1 L at , gentle fluids tonight   -- Trend BMP     Recurrent Falls at Home   Acute on Chronic Low Back  Pain   -- CT T and L spine showed unchanged chronic compression fracture of T7 vertebral body, 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 CHF component, deconditioning   -- Management of Afib as above  -- CT chest negative for PE   -- Checking respiratory panel   -- Monitor for hypoxia  -- PT/OT/CM     Elevated Inflammatory Markers   .3   -- Normal WBC and procal   -- Afebrile   -- Follow-up Bcx   -- Follow-up respiratory panel   -- Trend CBC, CRP     Hypomagnesemia - replacement protocols   Hypokalemia - replacement protocols     Essential HTN   -- HOLD PTA losartan and hydrochlorothiazide given LUCI and hypokalemia   -- Continue PTA metoprolol   -- hold PTA amlodipine     HLD - continue PTA statin and colestipol   History of Seizure Disorder - continue PTA phenytoin   Chronic Thrombocytopenia - trend CBC        Diet: Combination Diet Low Saturated Fat Sodium <2400mg Diet  DVT Prophylaxis: Pneumatic Compression Devices  Harris Catheter: Not present  Lines: None     Cardiac Monitoring: ACTIVE order. Indication: Tachyarrhythmias, acute (48 hours)  Code Status: No CPR- Do NOT Intubate - discussed and confirmed with patient, son and daughter at bedside     Clinically Significant Risk Factors Present on Admission        # Hypokalemia: Lowest K = 2.8 mmol/L in last 2 days, will replace as needed  # Hyponatremia: Lowest Na = 131 mmol/L in last 2 days, will monitor as appropriate  # Hypochloremia: Lowest Cl = 94 mmol/L in last 2 days, will monitor as appropriate  # Hypocalcemia: Lowest Ca = 7.9 mg/dL in last 2 days, will monitor and replace  "as appropriate     # Hypoalbuminemia: Lowest albumin = 3.2 g/dL at 7/31/2025  2:25 PM, will monitor as appropriate   # Thrombocytopenia: Lowest platelets = 101 in last 2 days, will monitor for bleeding   # Hypertension: Home medication list includes antihypertensive(s)           # Overweight: Estimated body mass index is 26.63 kg/m  as calculated from the following:    Height as of this encounter: 1.676 m (5' 6\").    Weight as of this encounter: 74.8 kg (165 lb).         # Financial/Environmental Concerns:           Disposition Plan     Medically Ready for Discharge: Anticipated in 2-4 Days       The patient's care was discussed with the Attending Physician, Dr. Saad Gondal who independently met with and assessed the patient and is in agreement with the assessment and plan     Halina Mason PA-C  Hospitalist Service  [facility]  Securely message with [employer] (more info)  Text page via [employer] Paging/Directory     ______________________________________________________________________    Chief Complaint   Back Pain   Shortness of Breath  Fatigue   Decreased Appetite     History is obtained from the patient, son and daughter     History of Present Illness   Chavez Tavares is a 89 year old female with PMHx of HTN, seizure disorder, DLD, PACs, LBBB who presented to the ER for evaluation of low back pain and shortness of breath.  Patient states that she has had worsening low back pain over the past several weeks.  States she had a fall several nights ago that exacerbated her symptoms and so presented to emergency room for further evaluation today.  She denies any numbness or tingling down lower extremities.  Pain is tolerable at rest but worsens with movement, rating it as 8/10.  She does state that she feels like she hit her head after the fall several days ago but denies any loss of consciousness.  Typically ambulates by holding onto things nearby at home, does not use cane consistently.    She also " endorses several weeks of increasing shortness of breath and fatigue but states this worsened acutely over the past several days -although states significantly improved today.  Shortness of breath worse with exertion.  Denies any orthopnea, no lower extremity edema.  No chest pain or palpitations.  Per son at bedside, patient is significantly improved from shortness of breath perspective today, states she previously had difficulty even speaking in full sentences.  Patient also endorses decreased appetite and p.o. intake over the past several days -states she has been feeling poor appetite.  Son states she has had minimal to no liquid intake over the past several days    Reviewed plans for hospital stay, including rate control overnight cardiology consultation in the morning.  Son and daughter updated at bedside, all questions answered    Past Medical History    Past Medical History:   Diagnosis Date    Chronic colitis     Epilepsy (H)     Hypertension     Mixed hyperlipidemia        Past Surgical History   Past Surgical History:   Procedure Laterality Date    COLONOSCOPY N/A 7/7/2022    Procedure: COLONOSCOPY;  Surgeon: Vincent Donnelly MD;  Location: [facility] OR    ENT SURGERY      ESOPHAGOSCOPY, GASTROSCOPY, DUODENOSCOPY (EGD), COMBINED N/A 7/7/2022    Procedure: ESOPHAGOGASTRODUODENOSCOPY WITH;  Surgeon: Vincent Donnelly MD;  Location: [facility] OR    LUMPECTOMY BREAST Right 01/01/2005       Prior to Admission Medications   Prior to Admission Medications   Prescriptions Last Dose Informant Patient Reported? Taking?   Calcium-Magnesium-Vitamin D (CALCIUM 1200+D3 PO) 7/31/2025 Morning  Yes Yes   Sig: Take 1 tablet by mouth every morning.   amLODIPine (NORVASC) 10 MG tablet 7/30/2025 Evening  Yes Yes   Sig: Take 10 mg by mouth every evening.   calcium carbonate (TUMS) 500 MG chewable tablet   Yes Yes   Sig: Take 1 chew tab by mouth as needed for heartburn.   colestipol (COLESTID) 1 g tablet  7/31/2025 Morning  Yes Yes   Sig: Take 1 g by mouth 2 times daily.   hydrochlorothiazide (HYDRODIURIL) 25 MG tablet 7/31/2025 Morning  Yes Yes   Sig: Take 25 mg by mouth every morning.   losartan (COZAAR) 100 MG tablet 7/31/2025 Morning  Yes Yes   Sig: Take 100 mg by mouth every morning.   metoprolol succinate ER (TOPROL XL) 25 MG 24 hr tablet 7/31/2025 Morning  Yes Yes   Sig: Take 2 tablets by mouth every morning.   phenytoin (DILANTIN) 100 MG ER capsule 7/30/2025 Evening  Yes Yes   Sig: Take 100 mg by mouth every evening.   pravastatin (PRAVACHOL) 40 MG tablet 7/30/2025 Evening  Yes Yes   Sig: [PRAVASTATIN (PRAVACHOL) 40 MG TABLET] Take 40 mg by mouth bedtime.   traZODone (DESYREL) 50 MG tablet 7/30/2025 Bedtime  Yes Yes   Sig: Take 2 tablets by mouth at bedtime.      Facility-Administered Medications: None           Physical Exam   Vital Signs: Temp: 97.5  F (36.4  C) Temp src: Oral BP: 114/57 Pulse: 111   Resp: 18 SpO2: 93 % O2 Device: None (Room air)    Weight: 165 lbs 0 oz    Constitutional: awake, alert, no apparent distress, and appears stated age  HEENT: Dry mucous membrane  Respiratory: No increased work of breathing, no accessory muscle use, clear to auscultation bilaterally, no crackles or wheezing.  Speaking in full sentences  Cardiovascular: Irregularly irregular rate and rhythm  Skin: Normal skin color, texture, turgor. No rashes and no jaundice  Musculoskeletal: no lower extremity pitting edema present. 2+ DP pulses  Neurologic: Awake, alert.   Neuropsychiatric: Appropriate mood, affect and eye contact. Cooperative.    Medical Decision Making             Data     I have personally reviewed the following data over the past 24 hrs:    8.8  \   13.1   / 101 (L)     131 (L) 94 (L) 30.0 (H) /  121 (H)   2.8 (L) 25 1.29 (H) \     ALT: 14 AST: 25 AP: 72 TBILI: 0.3   ALB: 3.2 (L) TOT PROTEIN: 6.0 (L) LIPASE: N/A     Trop: 48 (H) BNP: 5,038 (H)     TSH: 2.16 T4: N/A A1C: N/A     Procal: 0.34 CRP: 102.30 (H)  Lactic Acid: N/A       INR:  1.11 PTT:  N/A   D-dimer:  N/A Fibrinogen:  N/A       Imaging results reviewed over the past 24 hrs:   Recent Results (from the past 24 hours)   CTA Chest Redo Sternotomy Planning    Narrative    For Patients: As a result of the 21st Century Cures Act, medical imaging exams and procedure reports are released immediately into your electronic medical record. You may view this report before your referring provider. If you have questions, please contact your health care provider.    EXAM: CTA CHEST  LOCATION: [facility]  DATE: 7/31/2025    INDICATION: positive dimer, shortness of breath  COMPARISON: 11/1/2024  TECHNIQUE: CT chest pulmonary angiogram during arterial phase injection of IV contrast. Multiplanar reformats and MIP reconstructions were performed. Dose reduction techniques were used.   CONTRAST: IOPAMIDOL 300 MG/ML  ML BOTTLE: 50mL    FINDINGS:  ANGIOGRAM CHEST: Pulmonary arteries are normal caliber and negative for pulmonary emboli. Thoracic aorta is negative for dissection. No CT evidence of right heart strain.    LUNGS AND PLEURA: Trace bilateral effusions with bibasilar atelectasis. Small linear areas of atelectasis and scarring are noted bilaterally. Biapical pleural-parenchymal scarring.    MEDIASTINUM/AXILLAE: Heart is moderately enlarged. Subcentimeter mediastinal lymph nodes are noted. Mildly prominent right hilar lymph node measuring 1.1 cm short axis.    CORONARY ARTERY CALCIFICATION: Moderate.    UPPER ABDOMEN: Cirrhotic liver. Partially visualized gallbladder is distended.    MUSCULOSKELETAL: Degenerative changes of the spine.    Impression    1.  No pulmonary embolism.  2.  Trace bilateral effusions with bibasilar atelectasis.  3.  Cirrhotic liver.  4.  Stable mildly prominent right hilar lymph nodes which is nonspecific.   CT Thoracic Spine w/o Contrast    Narrative    EXAM: CT THORACIC SPINE W/O CONTRAST, CT LUMBAR SPINE  RECONSTRUCTED  LOCATION: [facility]  DATE: 7/31/2025    INDICATION: fall, back pain  COMPARISON: Lumbar spine CT dated 10/23/2024. Chest CT dated 08/08/2024.  TECHNIQUE:  1) Routine CT Thoracic Spine without IV contrast. Multiplanar reformats. Dose reduction techniques were used.   2) Routine CT Lumbar Spine without IV contrast. Multiplanar reformats. Dose reduction techniques were used.     FINDINGS:    THORACIC SPINE CT:  VERTEBRA: Diffuse osteopenia. Unchanged moderate anterior superior endplate height loss of the T7 vertebral body. The remaining vertebral body heights are maintained. Minimal retrolisthesis at T7-T8 is unchanged. Disc height loss, degenerative endplate   changes and anterior osteophytes throughout the thoracic spine. No acute fracture or posttraumatic subluxation.     CANAL/FORAMINA: Small disc osteophyte complexes at multiple levels. No high-grade spinal canal or neural foraminal stenosis.    PARASPINAL: Trace bilateral pleural effusions. Biapical scarring. Bilateral dependent atelectasis. Coronary calcifications. Atherosclerotic calcifications of the aortic arch and great vessels.    LUMBAR SPINE CT:  VERTEBRA: Vertebral body heights are maintained. Mild stepwise retrolisthesis from L1-L4 is unchanged and likely degenerative in nature. No acute fracture or posttraumatic subluxation.     CANAL/FORAMINA: Disc osteophyte complexes contribute to mild spinal canal narrowing at L2-L3, L3-L4 and L4-L5. Multilevel facet arthropathy, most pronounced at L4-L5 and L5-S1. Mild neural foraminal stenosis at multiple levels.    PARASPINAL: No acute extraspinal abnormality. Degenerative changes of the apposing surfaces of the spinous processes at L3-L4 and L4-L5 is suggestive of Baastrup's disease. Degenerative changes of the sacroiliac joints. Atrophy of the paravertebral   muscles. Atherosclerotic calcifications of the abdominal aorta and branching vessels.      Impression     IMPRESSION:  THORACIC SPINE CT:  1.  No acute fracture or posttraumatic subluxation.  2.  Unchanged chronic compression fracture deformity of the T7 vertebral body.  3.  No high-grade spinal canal or neural foraminal stenosis.  4.  Multilevel spondylosis.    LUMBAR SPINE CT:  1.  No acute fracture or posttraumatic subluxation.  2.  No high-grade spinal canal or neural foraminal stenosis.  3.  Multilevel spondylosis.  4.  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     CT Abdomen Pelvis w/o Contrast    Narrative    EXAM: CT ABDOMEN PELVIS W/O CONTRAST  LOCATION: [facility]  DATE: 7/31/2025    INDICATION: fall, pain, new cirrhosis  COMPARISON: CT angiogram of the chest, earlier today; MRI of the abdomen 3/3/2022  TECHNIQUE: CT scan of the abdomen and pelvis was performed without IV contrast. Multiplanar reformats were obtained. Dose reduction techniques were used.  CONTRAST: None.    FINDINGS:   LOWER CHEST: Moderate coronary calcifications and calcification of the aortic root. Trace pleural fluid.    HEPATOBILIARY: Liver is small with diffuse nodular contour consistent with cirrhosis. There is a circumscribed 2.6 cm cyst along the inferior right liver capsule (series 3, image 37). The gallbladder is distended. No gallbladder wall thickening or   pericholecystic inflammation. No bile duct enlargement.    PANCREAS: Normal.    SPLEEN: Normal.    ADRENAL GLANDS: Normal.    KIDNEYS/BLADDER: Bilateral parapelvic renal cysts are present and do not require imaging workup or follow-up. Excreted contrast is present in nondilated collecting systems and the upper ureters. No nephrolithiasis or hydronephrosis. There is a urine   contrast level in the urinary bladder. No bladder wall thickening or filling defects.    BOWEL: There are a few sigmoid diverticula. No dilated bowel or bowel wall thickening. There is a 13 x 23 mm fat attenuation lesion within the wall of the central small bowel  (series 4, image 126) consistent with a benign lipoma. No peritoneal thickening   or ascites.    LYMPH NODES: There is hazy attenuation within the central mesentery suggesting mesenteric congestion. No enlarged lymph nodes in the abdomen or pelvis.    VASCULATURE: Severe atheromatous calcification of the abdominal aorta with lesser involvement of the iliac and common femoral arteries. No aneurysm.    PELVIC ORGANS: Normal.    MUSCULOSKELETAL: Generalized bone demineralization. Small to moderate degenerative osteophytes and related disc space narrowing. Mild lumbar facet arthropathy. No aggressive or destructive bone lesions.      Impression    IMPRESSION:     1.  Cirrhosis. Stable benign liver cyst inferior right lobe.  2.  Hazy attenuation within the central mesentery consistent with mesenteric congestion.  3.  Few distal colonic diverticula but no acute bowel inflammation or mechanical obstruction.     CT Lumbar Spine Reconstructed    Narrative    EXAM: CT THORACIC SPINE W/O CONTRAST, CT LUMBAR SPINE RECONSTRUCTED  LOCATION: [facility]  DATE: 7/31/2025    INDICATION: fall, back pain  COMPARISON: Lumbar spine CT dated 10/23/2024. Chest CT dated 08/08/2024.  TECHNIQUE:  1) Routine CT Thoracic Spine without IV contrast. Multiplanar reformats. Dose reduction techniques were used.   2) Routine CT Lumbar Spine without IV contrast. Multiplanar reformats. Dose reduction techniques were used.     FINDINGS:    THORACIC SPINE CT:  VERTEBRA: Diffuse osteopenia. Unchanged moderate anterior superior endplate height loss of the T7 vertebral body. The remaining vertebral body heights are maintained. Minimal retrolisthesis at T7-T8 is unchanged. Disc height loss, degenerative endplate   changes and anterior osteophytes throughout the thoracic spine. No acute fracture or posttraumatic subluxation.     CANAL/FORAMINA: Small disc osteophyte complexes at multiple levels. No high-grade spinal canal or neural  foraminal stenosis.    PARASPINAL: Trace bilateral pleural effusions. Biapical scarring. Bilateral dependent atelectasis. Coronary calcifications. Atherosclerotic calcifications of the aortic arch and great vessels.    LUMBAR SPINE CT:  VERTEBRA: Vertebral body heights are maintained. Mild stepwise retrolisthesis from L1-L4 is unchanged and likely degenerative in nature. No acute fracture or posttraumatic subluxation.     CANAL/FORAMINA: Disc osteophyte complexes contribute to mild spinal canal narrowing at L2-L3, L3-L4 and L4-L5. Multilevel facet arthropathy, most pronounced at L4-L5 and L5-S1. Mild neural foraminal stenosis at multiple levels.    PARASPINAL: No acute extraspinal abnormality. Degenerative changes of the apposing surfaces of the spinous processes at L3-L4 and L4-L5 is suggestive of Baastrup's disease. Degenerative changes of the sacroiliac joints. Atrophy of the paravertebral   muscles. Atherosclerotic calcifications of the abdominal aorta and branching vessels.      Impression    IMPRESSION:  THORACIC SPINE CT:  1.  No acute fracture or posttraumatic subluxation.  2.  Unchanged chronic compression fracture deformity of the T7 vertebral body.  3.  No high-grade spinal canal or neural foraminal stenosis.  4.  Multilevel spondylosis.    LUMBAR SPINE CT:  1.  No acute fracture or posttraumatic subluxation.  2.  No high-grade spinal canal or neural foraminal stenosis.  3.  Multilevel spondylosis.  4.  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

## 2025-07-31 NOTE — ED TRIAGE NOTES
Pt arrived via ems from urgent care at [facility]. Pt had a fall around Tuesday night also been feeling short of breath for a few weeks. Also back pain. Seen at , where they found pt to be in afib w/ rvr. Labs resulted w/ elevated trop & bnp per . Currently pt denies chest pain, & SOB.      Triage Assessment (Adult)       Row Name 07/31/25 1400          Triage Assessment    Airway WDL WDL        Respiratory WDL    Respiratory WDL WDL        Skin Circulation/Temperature WDL    Skin Circulation/Temperature WDL WDL        Cardiac WDL    Cardiac WDL X;rhythm     Pulse Rate & Regularity apical pulse irregular;tachycardic        Peripheral/Neurovascular WDL    Peripheral Neurovascular WDL WDL        Cognitive/Neuro/Behavioral WDL    Cognitive/Neuro/Behavioral WDL WDL

## 2025-07-31 NOTE — ED NOTES
Bed: JNED-19  Expected date:   Expected time:   Means of arrival:   Comments:  [facility] 89 female a- fib RVR new onset coming from Clinic

## 2025-07-31 NOTE — MEDICATION SCRIBE - ADMISSION MEDICATION HISTORY
Medication Scribe Admission Medication History    Admission medication history is complete. The information provided in this note is only as accurate as the sources available at the time of the update.    Information Source(s): Patient and Family member via in-person    Pertinent Information: Patient's medications are being managed by daughters.     Changes made to PTA medication list:  Added: Metoprolol  Deleted: None  Changed:   Colestipol from every day to BID (per daughter)  Hydrochlorothiazide from 0.5 tab to full tab every day (per daughter)  Trazodone from 50 to 100 at bedtime (per daughter)    Allergies reviewed with patient and updates made in EHR: yes    Medication History Completed By: Primitivo Santos 7/31/2025 4:11 PM    PTA Med List   Medication Sig Last Dose/Taking    amLODIPine (NORVASC) 10 MG tablet Take 10 mg by mouth every evening. 7/30/2025 Evening    calcium carbonate (TUMS) 500 MG chewable tablet Take 1 chew tab by mouth as needed for heartburn. Taking As Needed    Calcium-Magnesium-Vitamin D (CALCIUM 1200+D3 PO) Take 1 tablet by mouth every morning. 7/31/2025 Morning    colestipol (COLESTID) 1 g tablet Take 1 g by mouth 2 times daily. 7/31/2025 Morning    hydrochlorothiazide (HYDRODIURIL) 25 MG tablet Take 25 mg by mouth every morning. 7/31/2025 Morning    losartan (COZAAR) 100 MG tablet Take 100 mg by mouth every morning. 7/31/2025 Morning    metoprolol succinate ER (TOPROL XL) 25 MG 24 hr tablet Take 2 tablets by mouth every morning. 7/31/2025 Morning    phenytoin (DILANTIN) 100 MG ER capsule Take 100 mg by mouth every evening. 7/30/2025 Evening    pravastatin (PRAVACHOL) 40 MG tablet [PRAVASTATIN (PRAVACHOL) 40 MG TABLET] Take 40 mg by mouth bedtime. 7/30/2025 Evening    traZODone (DESYREL) 50 MG tablet Take 2 tablets by mouth at bedtime. 7/30/2025 Bedtime

## 2025-07-31 NOTE — ED NOTES
Expected Patient Referral to ER    1:18 PM    Referring clinic/provider: [facility].    Reason for referral: Presented with back pain. Discussed that she had 3 months of SOB. Found to have elevated troponin, new onset heart failure and new onset afib. 93%. Bp 90/60. Got 1L NS. Creat 1.5, up from 0.8. troponin elevated. Bnp 7000. Getting CT PE study, they will start anticoagulation if its positive. Tried calling for a bed, but was told we were full so sending to ER for continued heart failure/afib workup and treatment.    Mode of arrival: Ambulance       Butch Moses MD  07/31/25 0469

## 2025-08-01 ENCOUNTER — APPOINTMENT (OUTPATIENT)
Dept: NEUROLOGY | Facility: HOSPITAL | Age: OVER 89
End: 2025-08-01
Attending: INTERNAL MEDICINE
Payer: COMMERCIAL

## 2025-08-01 ENCOUNTER — APPOINTMENT (OUTPATIENT)
Dept: CARDIOLOGY | Facility: HOSPITAL | Age: OVER 89
End: 2025-08-01
Payer: COMMERCIAL

## 2025-08-01 ENCOUNTER — APPOINTMENT (OUTPATIENT)
Dept: PHYSICAL THERAPY | Facility: HOSPITAL | Age: OVER 89
End: 2025-08-01
Payer: COMMERCIAL

## 2025-08-01 LAB
ALBUMIN SERPL BCG-MCNC: 3.7 G/DL (ref 3.5–5.2)
ALP SERPL-CCNC: 69 U/L (ref 40–150)
ALT SERPL W P-5'-P-CCNC: 12 U/L (ref 0–50)
AMMONIA PLAS-SCNC: 16 UMOL/L (ref 11–51)
ANION GAP SERPL CALCULATED.3IONS-SCNC: 11 MMOL/L (ref 7–15)
AST SERPL W P-5'-P-CCNC: 19 U/L (ref 0–45)
ATRIAL RATE - MUSE: 111 BPM
BILIRUB DIRECT SERPL-MCNC: 0.18 MG/DL (ref 0–0.3)
BILIRUB SERPL-MCNC: 0.4 MG/DL
BUN SERPL-MCNC: 22.5 MG/DL (ref 8–23)
C PNEUM DNA SPEC QL NAA+PROBE: NOT DETECTED
CALCIUM SERPL-MCNC: 8.3 MG/DL (ref 8.8–10.4)
CHLORIDE SERPL-SCNC: 100 MMOL/L (ref 98–107)
CREAT SERPL-MCNC: 1 MG/DL (ref 0.51–0.95)
DIASTOLIC BLOOD PRESSURE - MUSE: 68 MMHG
EGFRCR SERPLBLD CKD-EPI 2021: 52 ML/MIN/1.73M2
ERYTHROCYTE [DISTWIDTH] IN BLOOD BY AUTOMATED COUNT: 12.9 % (ref 10–15)
ETHANOL SERPL-MCNC: <0.01 G/DL
FLUAV H1 2009 PAND RNA SPEC QL NAA+PROBE: NOT DETECTED
FLUAV H1 RNA SPEC QL NAA+PROBE: NOT DETECTED
FLUAV H3 RNA SPEC QL NAA+PROBE: NOT DETECTED
FLUAV RNA SPEC QL NAA+PROBE: NOT DETECTED
FLUBV RNA SPEC QL NAA+PROBE: NOT DETECTED
GLUCOSE SERPL-MCNC: 103 MG/DL (ref 70–99)
HADV DNA SPEC QL NAA+PROBE: NOT DETECTED
HCO3 SERPL-SCNC: 24 MMOL/L (ref 22–29)
HCOV PNL SPEC NAA+PROBE: NOT DETECTED
HCT VFR BLD AUTO: 34 % (ref 35–47)
HGB BLD-MCNC: 11.9 G/DL (ref 11.7–15.7)
HMPV RNA SPEC QL NAA+PROBE: NOT DETECTED
HOLD SPECIMEN: NORMAL
HPIV1 RNA SPEC QL NAA+PROBE: NOT DETECTED
HPIV2 RNA SPEC QL NAA+PROBE: NOT DETECTED
HPIV3 RNA SPEC QL NAA+PROBE: NOT DETECTED
HPIV4 RNA SPEC QL NAA+PROBE: NOT DETECTED
INR PPP: 1.17 (ref 0.85–1.15)
INTERPRETATION ECG - MUSE: NORMAL
LVEF ECHO: NORMAL
M PNEUMO DNA SPEC QL NAA+PROBE: NOT DETECTED
MAGNESIUM SERPL-MCNC: 2.2 MG/DL (ref 1.7–2.3)
MCH RBC QN AUTO: 32.1 PG (ref 26.5–33)
MCHC RBC AUTO-ENTMCNC: 35 G/DL (ref 31.5–36.5)
MCV RBC AUTO: 92 FL (ref 78–100)
P AXIS - MUSE: NORMAL DEGREES
PHOSPHATE SERPL-MCNC: 2.1 MG/DL (ref 2.5–4.5)
PLATELET # BLD AUTO: 81 10E3/UL (ref 150–450)
POTASSIUM SERPL-SCNC: 3.4 MMOL/L (ref 3.4–5.3)
POTASSIUM SERPL-SCNC: 4.6 MMOL/L (ref 3.4–5.3)
PR INTERVAL - MUSE: NORMAL MS
PROT SERPL-MCNC: 6.1 G/DL (ref 6.4–8.3)
PROTHROMBIN TIME: 15.2 SECONDS (ref 11.8–14.8)
QRS DURATION - MUSE: 148 MS
QT - MUSE: 358 MS
QTC - MUSE: 461 MS
R AXIS - MUSE: 55 DEGREES
RBC # BLD AUTO: 3.71 10E6/UL (ref 3.8–5.2)
RSV RNA SPEC QL NAA+PROBE: NOT DETECTED
RSV RNA SPEC QL NAA+PROBE: NOT DETECTED
RV+EV RNA SPEC QL NAA+PROBE: NOT DETECTED
SODIUM SERPL-SCNC: 135 MMOL/L (ref 135–145)
SYSTOLIC BLOOD PRESSURE - MUSE: 87 MMHG
T AXIS - MUSE: 242 DEGREES
TROPONIN T SERPL HS-MCNC: 45 NG/L
VENTRICULAR RATE- MUSE: 100 BPM
WBC # BLD AUTO: 6.6 10E3/UL (ref 4–11)

## 2025-08-01 PROCEDURE — 250N000013 HC RX MED GY IP 250 OP 250 PS 637

## 2025-08-01 PROCEDURE — 250N000013 HC RX MED GY IP 250 OP 250 PS 637: Performed by: STUDENT IN AN ORGANIZED HEALTH CARE EDUCATION/TRAINING PROGRAM

## 2025-08-01 PROCEDURE — 255N000002 HC RX 255 OP 636

## 2025-08-01 PROCEDURE — 99222 1ST HOSP IP/OBS MODERATE 55: CPT | Performed by: PSYCHIATRY & NEUROLOGY

## 2025-08-01 PROCEDURE — 36415 COLL VENOUS BLD VENIPUNCTURE: CPT | Performed by: INTERNAL MEDICINE

## 2025-08-01 PROCEDURE — 95819 EEG AWAKE AND ASLEEP: CPT

## 2025-08-01 PROCEDURE — 250N000011 HC RX IP 250 OP 636: Performed by: INTERNAL MEDICINE

## 2025-08-01 PROCEDURE — 97116 GAIT TRAINING THERAPY: CPT | Mod: GP

## 2025-08-01 PROCEDURE — 999N000208 ECHOCARDIOGRAM COMPLETE

## 2025-08-01 PROCEDURE — 82077 ASSAY SPEC XCP UR&BREATH IA: CPT | Performed by: INTERNAL MEDICINE

## 2025-08-01 PROCEDURE — 85610 PROTHROMBIN TIME: CPT | Performed by: INTERNAL MEDICINE

## 2025-08-01 PROCEDURE — 84132 ASSAY OF SERUM POTASSIUM: CPT | Performed by: STUDENT IN AN ORGANIZED HEALTH CARE EDUCATION/TRAINING PROGRAM

## 2025-08-01 PROCEDURE — 95816 EEG AWAKE AND DROWSY: CPT | Mod: 26 | Performed by: PSYCHIATRY & NEUROLOGY

## 2025-08-01 PROCEDURE — 84100 ASSAY OF PHOSPHORUS: CPT | Performed by: STUDENT IN AN ORGANIZED HEALTH CARE EDUCATION/TRAINING PROGRAM

## 2025-08-01 PROCEDURE — 97161 PT EVAL LOW COMPLEX 20 MIN: CPT | Mod: GP

## 2025-08-01 PROCEDURE — 85018 HEMOGLOBIN: CPT

## 2025-08-01 PROCEDURE — 80048 BASIC METABOLIC PNL TOTAL CA: CPT

## 2025-08-01 PROCEDURE — 87040 BLOOD CULTURE FOR BACTERIA: CPT

## 2025-08-01 PROCEDURE — 83735 ASSAY OF MAGNESIUM: CPT | Performed by: STUDENT IN AN ORGANIZED HEALTH CARE EDUCATION/TRAINING PROGRAM

## 2025-08-01 PROCEDURE — 250N000013 HC RX MED GY IP 250 OP 250 PS 637: Performed by: PHYSICIAN ASSISTANT

## 2025-08-01 PROCEDURE — 93306 TTE W/DOPPLER COMPLETE: CPT | Mod: 26 | Performed by: INTERNAL MEDICINE

## 2025-08-01 PROCEDURE — 120N000004 HC R&B MS OVERFLOW

## 2025-08-01 PROCEDURE — 99233 SBSQ HOSP IP/OBS HIGH 50: CPT | Performed by: INTERNAL MEDICINE

## 2025-08-01 PROCEDURE — 250N000013 HC RX MED GY IP 250 OP 250 PS 637: Performed by: INTERNAL MEDICINE

## 2025-08-01 PROCEDURE — 82140 ASSAY OF AMMONIA: CPT | Performed by: INTERNAL MEDICINE

## 2025-08-01 PROCEDURE — 99222 1ST HOSP IP/OBS MODERATE 55: CPT | Performed by: PHYSICIAN ASSISTANT

## 2025-08-01 PROCEDURE — 84155 ASSAY OF PROTEIN SERUM: CPT | Performed by: INTERNAL MEDICINE

## 2025-08-01 PROCEDURE — 84484 ASSAY OF TROPONIN QUANT: CPT | Performed by: INTERNAL MEDICINE

## 2025-08-01 PROCEDURE — 36415 COLL VENOUS BLD VENIPUNCTURE: CPT

## 2025-08-01 RX ORDER — METOPROLOL SUCCINATE 100 MG/1
100 TABLET, EXTENDED RELEASE ORAL EVERY MORNING
Status: DISCONTINUED | OUTPATIENT
Start: 2025-08-02 | End: 2025-08-05

## 2025-08-01 RX ORDER — POTASSIUM CHLORIDE 1500 MG/1
40 TABLET, EXTENDED RELEASE ORAL ONCE
Status: COMPLETED | OUTPATIENT
Start: 2025-08-01 | End: 2025-08-01

## 2025-08-01 RX ORDER — METOPROLOL TARTRATE 25 MG/1
25 TABLET, FILM COATED ORAL EVERY 6 HOURS
Status: COMPLETED | OUTPATIENT
Start: 2025-08-01 | End: 2025-08-01

## 2025-08-01 RX ORDER — METOPROLOL SUCCINATE 50 MG/1
50 TABLET, EXTENDED RELEASE ORAL EVERY MORNING
Status: DISCONTINUED | OUTPATIENT
Start: 2025-08-01 | End: 2025-08-01

## 2025-08-01 RX ORDER — POTASSIUM CHLORIDE 1.5 G/1.58G
40 POWDER, FOR SOLUTION ORAL ONCE
Status: COMPLETED | OUTPATIENT
Start: 2025-08-01 | End: 2025-08-01

## 2025-08-01 RX ORDER — CEFTRIAXONE 1 G/1
1 INJECTION, POWDER, FOR SOLUTION INTRAMUSCULAR; INTRAVENOUS EVERY 24 HOURS
Status: DISCONTINUED | OUTPATIENT
Start: 2025-08-01 | End: 2025-08-04

## 2025-08-01 RX ORDER — THIAMINE HYDROCHLORIDE 100 MG/ML
100 INJECTION, SOLUTION INTRAMUSCULAR; INTRAVENOUS DAILY
Status: DISCONTINUED | OUTPATIENT
Start: 2025-08-01 | End: 2025-08-07 | Stop reason: HOSPADM

## 2025-08-01 RX ORDER — FOLIC ACID 1 MG/1
1 TABLET ORAL DAILY
Status: DISCONTINUED | OUTPATIENT
Start: 2025-08-01 | End: 2025-08-07 | Stop reason: HOSPADM

## 2025-08-01 RX ADMIN — POTASSIUM & SODIUM PHOSPHATES POWDER PACK 280-160-250 MG 1 PACKET: 280-160-250 PACK at 14:56

## 2025-08-01 RX ADMIN — POTASSIUM CHLORIDE 40 MEQ: 1500 TABLET, EXTENDED RELEASE ORAL at 06:34

## 2025-08-01 RX ADMIN — METOPROLOL SUCCINATE 50 MG: 50 TABLET, EXTENDED RELEASE ORAL at 08:49

## 2025-08-01 RX ADMIN — TRAZODONE HYDROCHLORIDE 100 MG: 50 TABLET ORAL at 20:12

## 2025-08-01 RX ADMIN — ACETAMINOPHEN 975 MG: 325 TABLET ORAL at 08:51

## 2025-08-01 RX ADMIN — PHENYTOIN SODIUM 100 MG: 100 CAPSULE, EXTENDED RELEASE ORAL at 20:08

## 2025-08-01 RX ADMIN — PERFLUTREN 2 ML (DILUTED): 6.52 INJECTION, SUSPENSION INTRAVENOUS at 13:30

## 2025-08-01 RX ADMIN — METOPROLOL TARTRATE 25 MG: 25 TABLET, FILM COATED ORAL at 20:08

## 2025-08-01 RX ADMIN — MONTELUKAST SODIUM 1 G: 4 TABLET, CHEWABLE ORAL at 08:53

## 2025-08-01 RX ADMIN — POTASSIUM & SODIUM PHOSPHATES POWDER PACK 280-160-250 MG 1 PACKET: 280-160-250 PACK at 06:34

## 2025-08-01 RX ADMIN — CEFTRIAXONE SODIUM 1 G: 1 INJECTION, POWDER, FOR SOLUTION INTRAMUSCULAR; INTRAVENOUS at 08:48

## 2025-08-01 RX ADMIN — POTASSIUM CHLORIDE 40 MEQ: 1.5 POWDER, FOR SOLUTION ORAL at 08:50

## 2025-08-01 RX ADMIN — METOPROLOL TARTRATE 25 MG: 25 TABLET, FILM COATED ORAL at 15:51

## 2025-08-01 RX ADMIN — MONTELUKAST SODIUM 1 G: 4 TABLET, CHEWABLE ORAL at 20:08

## 2025-08-01 RX ADMIN — THIAMINE HYDROCHLORIDE 100 MG: 100 INJECTION, SOLUTION INTRAMUSCULAR; INTRAVENOUS at 09:55

## 2025-08-01 RX ADMIN — FOLIC ACID 1 MG: 1 TABLET ORAL at 09:55

## 2025-08-01 RX ADMIN — ACETAMINOPHEN 975 MG: 325 TABLET ORAL at 20:08

## 2025-08-01 RX ADMIN — POTASSIUM & SODIUM PHOSPHATES POWDER PACK 280-160-250 MG 1 PACKET: 280-160-250 PACK at 09:54

## 2025-08-01 ASSESSMENT — ACTIVITIES OF DAILY LIVING (ADL)
ADLS_ACUITY_SCORE: 53
ADLS_ACUITY_SCORE: 53
ADLS_ACUITY_SCORE: 63
ADLS_ACUITY_SCORE: 53
ADLS_ACUITY_SCORE: 63
ADLS_ACUITY_SCORE: 53
ADLS_ACUITY_SCORE: 61
ADLS_ACUITY_SCORE: 63
ADLS_ACUITY_SCORE: 66
ADLS_ACUITY_SCORE: 63
ADLS_ACUITY_SCORE: 61
ADLS_ACUITY_SCORE: 66
ADLS_ACUITY_SCORE: 53
ADLS_ACUITY_SCORE: 61
ADLS_ACUITY_SCORE: 53
ADLS_ACUITY_SCORE: 63
ADLS_ACUITY_SCORE: 53
ADLS_ACUITY_SCORE: 53

## 2025-08-01 NOTE — PROGRESS NOTES
08/01/25 0806   Appointment Info   Signing Clinician's Name / Credentials (PT) Rosario Overton PT   Living Environment   People in Home alone   Current Living Arrangements house  ([facility])   Home Accessibility stairs to enter home   Number of Stairs, Main Entrance 2   Stair Railings, Main Entrance railings safe and in good condition   Transportation Anticipated family or friend will provide   Self-Care   Equipment Currently Used at Home cane, straight;walker, rolling   Fall history within last six months yes   Number of times patient has fallen within last six months 1   Activity/Exercise/Self-Care Comment ambulates without AD; independent ADL's; dtr assist with shopping and transportation; has cleaning assist every three weeks   General Information   Onset of Illness/Injury or Date of Surgery 07/31/25   Referring Physician Dr. Jasso   Patient/Family Therapy Goals Statement (PT) go home with home care   Pertinent History of Current Problem (include personal factors and/or comorbidities that impact the POC) afib, fall, encephalopathy, [place]'s disease L3-4, L4-5, old T7 comp. fx, seizure history   Existing Precautions/Restrictions fall;oxygen therapy device and L/min  (O2 2L NC)   Cognition   Affect/Mental Status (Cognition) WFL   Orientation Status (Cognition) oriented x 3   Follows Commands (Cognition) WFL   Range of Motion (ROM)   Range of Motion ROM is WFL   Strength (Manual Muscle Testing)   Strength (Manual Muscle Testing) Deficits observed during functional mobility   Bed Mobility   Bed Mobility supine-sit   Supine-Sit [place] (Bed Mobility) supervision   Transfers   Transfers sit-stand transfer   Sit-Stand Transfer   Sit-Stand [place] (Transfers) contact guard;verbal cues   Gait/Stairs (Locomotion)   [place] Level (Gait) minimum assist (75% patient effort);verbal cues   Assistive Device (Gait) other (see comments)   Distance in Feet (Gait) 10   Pattern (Gait) step-to   Deviations/Abnormal  Patterns (Gait) gait speed decreased;weight shifting decreased;stride length decreased;florin decreased   Comment, (Gait/Stairs) unable to tolerate stairs this date due to fatigue, weakness, dyspnea   Balance   Balance Comments needing assist of 1 and UE support for dynamic standing; cga static standing   Clinical Impression   Criteria for Skilled Therapeutic Intervention Yes, treatment indicated   PT Diagnosis (PT) impaired functional mobility   Influenced by the following impairments decreased balance, strength, activity tolerance   Functional limitations due to impairments gait, transfers, stairs   Clinical Presentation (PT Evaluation Complexity) evolving   Clinical Presentation Rationale pt presents as medically diagnosed   Clinical Decision Making (Complexity) low complexity   Planned Therapy Interventions (PT) balance training;bed mobility training;gait training;home exercise program;neuromuscular re-education;patient/family education;stair training;strengthening;transfer training   Risk & Benefits of therapy have been explained evaluation/treatment results reviewed;patient   PT Total Evaluation Time   PT Eval, Low Complexity Minutes (17202) 12   Physical Therapy Goals   PT Frequency Daily   PT Predicted Duration/Target Date for Goal Attainment 08/08/25   PT Goals Bed Mobility;Transfers;Gait;Stairs   PT: Bed Mobility Independent;Supine to/from sit   PT: Transfers Modified independent;Sit to/from stand;Assistive device   PT: Gait Modified independent;Assistive device;150 feet   PT: Stairs 2 stairs;Rail on left;Rail on right;Minimal assist   Interventions   Interventions Quick Adds Gait Training   Gait Training   Gait Training Minutes (23233) 8   Symptoms Noted During/After Treatment (Gait Training) fatigue;shortness of breath   Treatment Detail/Skilled Intervention gait training with SEC, min assist for balance, pt holding onto therapist with free arm for support; O2 RA 92%   Distance in Feet 40   [place]  Level (Gait Training) minimum assist (75% patient effort)   Physical Assistance Level (Gait Training) verbal cues;1 person assist   Weight Bearing (Gait Training) full weight-bearing   Assistive Device (Gait Training) straight cane   Pattern Analysis (Gait Training) swing-through gait   Gait Analysis Deviations decreased step length;decreased florin;increased time in double stance;decreased weight-shifting ability;decreased toe-to-floor clearance   Impairments (Gait Analysis/Training) balance impaired;strength decreased   PT Discharge Planning   PT Plan gait with SEC or FWW; LE ex; 2 NEVAEH   PT Discharge Recommendation (DC Rec) Transitional Care Facility   PT Rationale for DC Rec recommend SEC or FWW for use at home and continued PT; pt needing assist of 1 for safe mobility; if home, pt needs 24 hour supervision and home PT, otherwise continued PT at TCU for strengthening and mobility training with AD use   PT Brief overview of current status amb. min assist with cane 40 feet   PT Total Distance Amb During Session (feet) 50   PT Equipment Needed at Discharge walker, rolling;cane, straight   Physical Therapy Time and Intention   Timed Code Treatment Minutes 8   Total Session Time (sum of timed and untimed services) 20

## 2025-08-01 NOTE — CONSULTS
Care Management Follow Up    Length of Stay (days): 1    Expected Discharge Date: 08/02/2025     Concerns to be Addressed: discharge planning, patient on 1:1 for confusion, multiple consultations    Patient plan of care discussed at interdisciplinary rounds: Yes    Anticipated Discharge Disposition: Other (Comments) (unknown at this time)     Anticipated Discharge Services: Other (see comment) (unknown at this time, but daughter would like Home Care help if she qualifies at discharge.)  Anticipated Discharge DME: None    Patient/family educated on Medicare website which has current facility and service quality ratings:  will be done  Education Provided on the Discharge Plan:  will be done  Patient/Family in Agreement with the Plan:  TBD    Referrals Placed by CM/SW:  To be placed  Private pay costs discussed: Will discuss when choosing TCU    Discussed  Partnership in Safe Discharge Planning  document with patient/family: No     Handoff Completed: No, handoff not indicated or clinically appropriate  - Handoff would be clinically appropriate at discharge- Patient sees Silvia Jama  at [facility]    Additional Information:  MD anticipates discharge to TCU- quite a few of consultations need to happen prior to this determination. CM to follow for discharge recommendations.    Next Steps: Patient requires a lot of consultations to date per MD: neurology, spine, PT and OT.    Mar Cruz, [facility]   08/01/25

## 2025-08-01 NOTE — PROGRESS NOTES
Pt arrived onto unit at 2240 H this evening. VSS, denied pain at rest, on 2 L NC, afib HR 90s. Will hand-off to NOC RN.

## 2025-08-01 NOTE — PROGRESS NOTES
Bedside EEG was performed that included photic stimulation; hyperventilation was deferred. The patient was awake and asleep throughout the recording.  [id number] used.

## 2025-08-01 NOTE — CONSULTS
"Care Management Initial Consult    General Information  Assessment completed with: Patient, Children, Chavez and daughter  via phone  Type of CM/SW Visit: Initial Assessment    Primary Care Provider verified and updated as needed: Yes   Readmission within the last 30 days: no previous admission in last 30 days      Reason for Consult: discharge planning  Advance Care Planning: Advance Care Planning Reviewed: no concerns identified          Communication Assessment  Patient's communication style: spoken language (English or Bilingual)             Cognitive  Cognitive/Neuro/Behavioral: WDL                      Living Environment:   People in home: alone     Current living Arrangements: town home (\"2 steps in from the garage or 2 steps in from the front of the house. 2 level townhouse. She has a full set of steps to the basement, but rarely uses them. She has everything she needs on the main level\".)      Able to return to prior arrangements: other (see comments) (unknown at this time)       Family/Social Support:  Care provided by: self, child(quirino)  Provides care for: no one  Marital Status:   Support system: Children          Description of Support System: Supportive, Involved    Support Assessment: Adequate family and caregiver support, Adequate social supports, Patient communicates needs well met    Current Resources:   Patient receiving home care services: No (\"She has had Home Care before and we liked it. It is done now. I don't remember the name of the agency she had back in Nov 2024.\")        Community Resources: Housekeeping/Chore Agency (\"she has housekeeping help every few weeks\")  Equipment currently used at home: other (see comments) (\"she has a cane at home, but never uses it. She always says, 'I don't need it'.\")  Supplies currently used at home: Incontinence Supplies, Hearing Aid Batteries, Other (\"She occasionally wears depends. Hearing aids. Glasses\")    Employment/Financial:  Employment " "Status: retired     Employment/ Comments: \"no  history\"  Financial Concerns: none   Referral to Financial Worker: No       Does the patient's insurance plan have a 3 day qualifying hospital stay waiver?  Yes     Which insurance plan 3 day waiver is available? Alternative insurance waiver    Will the waiver be used for post-acute placement? Undetermined at this time    Lifestyle & Psychosocial Needs:  Social Drivers of Health     Food Insecurity: Low Risk  (11/1/2024)    Food Insecurity     Within the past 12 months, did you worry that your food would run out before you got money to buy more?: No     Within the past 12 months, did the food you bought just not last and you didn t have money to get more?: No   Depression: Not at risk (12/2/2024)    Received from [employer]    PHQ-2     PHQ-2 TOTAL SCORE: 0   Housing Stability: Low Risk  (11/1/2024)    Housing Stability     Do you have housing? : Yes     Are you worried about losing your housing?: No   Tobacco Use: Medium Risk (7/31/2025)    Patient History     Smoking Tobacco Use: Former     Smokeless Tobacco Use: Unknown     Passive Exposure: Not on file   Financial Resource Strain: Low Risk  (11/1/2024)    Financial Resource Strain     Within the past 12 months, have you or your family members you live with been unable to get utilities (heat, electricity) when it was really needed?: No   Alcohol Use: Not on file   Transportation Needs: Low Risk  (11/1/2024)    Transportation Needs     Within the past 12 months, has lack of transportation kept you from medical appointments, getting your medicines, non-medical meetings or appointments, work, or from getting things that you need?: No   Physical Activity: Not on file   Interpersonal Safety: Not on file   Stress: Not on file   Social Connections: Socially Integrated (11/28/2023)    Received from [employer]    Social Connections     Do you " "often feel lonely or isolated from those around you?: 0   Health Literacy: Not on file       Functional Status:  Prior to admission patient needed assistance:   Dependent ADLs:: Ambulation-no assistive device, Independent  Dependent IADLs:: Cleaning, Shopping, Transportation, Medication Management (daughter sets up her medications and she takes them on her own.  states, \"she is more often forgetting to take some of them daily.\")  Assesssment of Functional Status:  (unknown at this time)    Mental Health Status:  Mental Health Status: No Current Concerns       Chemical Dependency Status:  Chemical Dependency Status: No Current Concerns             Values/Beliefs:  Spiritual, Cultural Beliefs, Temple Practices, Values that affect care: no               Discussed  Partnership in Safe Discharge Planning  document with patient/family: No    Additional Information:  Chavez lives in a townhouse alone. There are \"2 steps in from the garage or 2 steps in from the front of the house. It is a 2 level townhouse. She has a full set of steps to the basement, but rarely uses them. She has everything she needs on the main level\".    She is independent with ADLs and gets help with some IADLs. \"She has housekeeping help every few weeks\". Daughter  sets up her medications and she takes them on her own.  states, \"she is more often forgetting to take some of them daily. It is usually just the AM or just the PM medications.\"     Her daughter  states, \"one of her kids don't check on her physically everyday, but every few days someone is there. One of us often calls her daily.    Per , \"she has had Home Care before and we liked it. It is done now. I don't remember the name of the agency she had back in Nov 2024.\"    Ride: Family    CM to follow for medical progression of care, discharge recommendations, and final discharge plan. Writer verified patient demographics and updated any changes needed in the patient " chart.    Next Steps:   Medical plan/delay: Medical clearance. Cardiology plan. PT/OT recommendations.    Dispo: return to home; may benefit from Home Care.    CM to do: awaiting needs.    Michelle Alicea RN

## 2025-08-01 NOTE — CARE PLAN
89-year-old female presented to the ER for evaluation of low back pain and shortness of breath, was seen to be in A-fib with RVR, also had elevated troponin, has no diagnosis of A-fib with RVR in the past, troponin elevated but flat and downtrending, already on Toprol 50, increased to 75 also placed on IV Lopressor as needed for for elevated heart rate, patient heart rate has improved, shortness of breath has self resolved, appears to be quite dehydrated for which gentle IV hydration has been started, monitor on telemetry monitoring and monitor volume status closely, cardiology consulted, follow-up echocardiogram, also has hyponatremia, hypochloremia and an LUCI, monitor BMP closely, also has hypoalbuminemia for which albumin was repleted, holding hydrochlorothiazide and losartan because of LUCI, has a history of recurrent falls at home along with back pain, CT scan of the thoracic and lumbar spine negative for acute process, CT scan of the head ordered, pending results, continue with pain management as per protocol, continued PT and OT evaluation, fall precautions, case management consulted for discharge planning and disposition, CT scan of the chest negative for PE, monitor for worsening respiratory distress and hypoxia, follow-up respiratory panel, follow-up blood cultures and urine cultures, trend CRP, also appears to be severely hypokalemia, potassium repleted and hypokalemia resolved, BNP elevated but do not suspect CHF as the cause and suspecting most likely in setting of tachycardia induced cardiomyopathy and A-fib, patient appears to be dry, as above closely monitor volume status and wants to resolve can take off of fluids as appropriate, patient also has thrombocytopenia which appears to be chronic, monitor CBC closely, no current complaint of chest pain, repeat EKG for symptoms, rest as per PA note.

## 2025-08-01 NOTE — PLAN OF CARE
Problem: Adult Inpatient Plan of Care  Goal: Plan of Care Review  Description: The Plan of Care Review/Shift note should be completed every shift.  The Outcome Evaluation is a brief statement about your assessment that the patient is improving, declining, or no change.  This information will be displayed automatically on your shift  note.  Outcome: Progressing   Goal Outcome Evaluation:               Pt A&Ox4. Pt does demonstrate how to use a call light when asked how to call for help. Pt has not been trying to get out of bed. Family is at bedside and is aware that pt needs to call before getting up. Bed alarm is on.

## 2025-08-01 NOTE — CONSULTS
[facility]  [place]    Chavez Tavares MRN# [id number]   Age: 89 year old YOB: 1931               Assessment and Plan:      AMS       I suspect she does have some underlying mild cognitive impairment which puts her at greater risk for metabolic encephalopathy such as from UTI and dehydration  Head CT was fine yesterday  EEG shows no seizure activity    While she did have a seizure about 40 years ago and continues on a very small dose of phenytoin, I am not convinced that she had seizure yesterday or anytime recently.      Will hold off on further neuro work up, I'll sign off, please call if questions.              Chief Complaint/HPI:     89 y/o woman sent to [facility] from urgent care with back pain, shortness of breath for 3 months, new onset heart failure, new onset afib, elevated Cr and BNP.  She was found to have UTI and Neurology consult is requested to evaluate for encephalopathy.  Previous notes indicate some underlying cognitive impairment.  She tells me she did have seizure in the past, but none in 40 years. H&PP and other notes reviewed in detail.              Past Medical History:    has a past medical history of Chronic colitis, Epilepsy (H), Hypertension, and Mixed hyperlipidemia.          Past Surgical History:    has a past surgical history that includes Lumpectomy breast (Right, 2005); ENT surgery; Esophagoscopy, gastroscopy, duodenoscopy (EGD), combined (N/A, 2022); and Colonoscopy (N/A, 2022).          Social History:     Social History     Tobacco Use    Smoking status: Former     Current packs/day: 0.00     Types: Cigarettes     Quit date: 2000     Years since quittin.6    Smokeless tobacco: Not on file   Substance Use Topics    Alcohol use: Not on file             Family History:     Family History   Problem Relation Age of Onset    Coronary Artery Disease Mother     Coronary Artery Disease Father                 Allergies:     Allergies    Allergen Reactions    Lisinopril Cough    Simvastatin Muscle Pain (Myalgia)    Carbamazepine Rash             Medications:     Current Facility-Administered Medications:     acetaminophen (TYLENOL) tablet 975 mg, 975 mg, Oral, TID, Halina Mason PA-C, 975 mg at 08/01/25 0851    [Held by provider] amLODIPine (NORVASC) tablet 10 mg, 10 mg, Oral, QPM, Halina Mason PA-C    calcium carbonate (TUMS) chewable tablet 1,000 mg, 1,000 mg, Oral, 4x Daily PRN, Halina Mason PA-C    cefTRIAXone (ROCEPHIN) 1 g vial to attach to  mL bag for ADULTS or NS 50 mL bag for PEDS, 1 g, Intravenous, Q24H, Tanna Jasso MD, 1 g at 08/01/25 0848    colestipol (COLESTID) tablet 1 g, 1 g, Oral, BID, Halina Mason PA-C, 1 g at 08/01/25 0853    folic acid (FOLVITE) tablet 1 mg, 1 mg, Oral, Daily, Tanna Jasso MD, 1 mg at 08/01/25 0955    [Held by provider] hydrochlorothiazide (HYDRODIURIL) tablet 25 mg, 25 mg, Oral, Isabella SUAREZ Emma, PA-C    Lidocaine (LIDOCARE) 4 % Patch 1 patch, 1 patch, Transdermal, Daily PRN, Halina Mason PA-C    lidocaine (LMX4) cream, , Topical, Q1H PRN, Halina Mason PA-C    lidocaine 1 % 0.1-1 mL, 0.1-1 mL, Other, Q1H PRN, Halina Mason PA-C    [Held by provider] losartan (COZAAR) tablet 100 mg, 100 mg, Oral, Isabella SUAREZ Emma, PA-C    metoprolol (LOPRESSOR) injection 5 mg, 5 mg, Intravenous, Q5 Min PRN, Halina Mason PA-C    metoprolol succinate ER (TOPROL XL) 24 hr tablet 50 mg, 50 mg, Oral, DANIELA, Vaishali James PA-C, 50 mg at 08/01/25 0849    naloxone (NARCAN) injection 0.2 mg, 0.2 mg, Intravenous, Q2 Min PRN **OR** naloxone (NARCAN) injection 0.4 mg, 0.4 mg, Intravenous, Q2 Min PRN **OR** naloxone (NARCAN) injection 0.2 mg, 0.2 mg, Intramuscular, Q2 Min PRN **OR** naloxone (NARCAN) injection 0.4 mg, 0.4 mg, Intramuscular, Q2 Min PRN, Gondal, Saad J, MD    ondansetron (ZOFRAN ODT) ODT tab 4 mg, 4 mg, Oral, Q6H PRN **OR** ondansetron (ZOFRAN) injection 4 mg, 4 mg, Intravenous, Q6H PRN, Halina Mason PA-C    " oxyCODONE (ROXICODONE) tablet 5 mg, 5 mg, Oral, Q4H PRN, Halina Mason PA-C    oxyCODONE IR (ROXICODONE) half-tab 2.5 mg, 2.5 mg, Oral, Q4H PRN, Halina Mason PA-C    phenytoin (DILANTIN) ER capsule 100 mg, 100 mg, Oral, QPM, Halina Mason PA-C, 100 mg at 07/31/25 2146    polyethylene glycol (MIRALAX) Packet 17 g, 17 g, Oral, BID PRN, Halina Mason PA-C    potassium & sodium phosphates (NEUTRA-PHOS) Packet 1 packet, 1 packet, Oral or Feeding Tube, Q4H, Gondal, Saad J, MD, 1 packet at 08/01/25 0954    pravastatin (PRAVACHOL) tablet 40 mg, 40 mg, Oral, At Bedtime, Halina Mason PA-C, 40 mg at 07/31/25 2146    prochlorperazine (COMPAZINE) injection 5 mg, 5 mg, Intravenous, Q6H PRN **OR** prochlorperazine (COMPAZINE) tablet 5 mg, 5 mg, Oral, Q6H PRN, Halina Mason PA-C    senna-docusate (SENOKOT-S/PERICOLACE) 8.6-50 MG per tablet 1 tablet, 1 tablet, Oral, BID PRN **OR** senna-docusate (SENOKOT-S/PERICOLACE) 8.6-50 MG per tablet 2 tablet, 2 tablet, Oral, BID PRN, Halina Mason PA-C    sodium chloride (PF) 0.9% PF flush 3 mL, 3 mL, Intracatheter, Q8H [place], Halina Mason PA-C, 3 mL at 08/01/25 0551    sodium chloride (PF) 0.9% PF flush 3 mL, 3 mL, Intracatheter, q1 min prn, Halina Mason PA-C    thiamine (B-1) injection 100 mg, 100 mg, Intravenous, Daily, Tanna Jasso MD, 100 mg at 08/01/25 0955    traZODone (DESYREL) tablet 100 mg, 100 mg, Oral, At Bedtime, Halina Mason PA-C, 100 mg at 07/31/25 0000              Physical Exam:      Vitals: BP (!) 142/82 (BP Location: Left arm)   Pulse 118   Temp 97.4  F (36.3  C) (Oral)   Resp 18   Ht 1.676 m (5' 6\")   Wt 82.2 kg (181 lb 3.5 oz)   SpO2 94%   BMI 29.25 kg/m    BMI= Body mass index is 29.25 kg/m .     Patient is alert and in no acute distress. Neck was supple, no carotid bruits, thyromegaly, lymphadenopathy or JVD noted.   Neurological Exam:   Mental status: Patient is quite awake and alert, Speech is clear and fluent   She knows [employer] but was off on the floor " number  She tells me the correct month (by number).    She had trouble calculating nickels in $1 until I had her do dimes in $1, then she got it.  After a few minutes she was able to remember 3rd floor.     CN II: Visual fields are full to confrontation.  PERRLA.   CN III, IV, VI: EOMI.    CN VII: Face is symmetric   CN VII: Hearing is normal to conversation   CN IX, X: Phonation is normal.    CN XI: Head turning and shoulder shrug are intact   CN XII: Tongue is midline with normal movements    Motor: Muscle bulk and tone are normal. No pronator drift. Strength is 5/5 bilaterally. No fasciculations noted.   Reflexes: Reflexes are symmetric, diminished throughout. Plantar responses are flexor.   Sensory: Light touch, pinprick, and vibration sense are intact bilaterally.    Coordination: Rapid alternating movements and fine finger movements are intact. There is no dysmetria on finger-to-nose testing.    Gait: gait is limited by back pain       Prasanna Lopez MD       More than 60 minutes spent reviewing records and results, evaluating patient, discussing with pt and family and on documentation

## 2025-08-01 NOTE — PLAN OF CARE
Problem: Adult Inpatient Plan of Care  Goal: Optimal Comfort and Wellbeing  Outcome: Progressing     Problem: Dysrhythmia  Goal: Normalized Cardiac Rhythm  Outcome: Progressing     Problem: Heart Failure  Goal: Fluid and Electrolyte Balance  Outcome: Progressing     Problem: Heart Failure  Goal: Effective Oxygenation and Ventilation  Outcome: Progressing     Problem: Fall Injury Risk  Goal: Absence of Fall and Fall-Related Injury  Outcome: Progressing     Problem: Infection  Goal: Absence of Infection Signs and Symptoms  Outcome: Progressing    Pt A/Ox4 most of shift with intermittent confusion. Pt noted to be disoriented to place and situation towards the evening, mostly redirectable. X6 soft/loose stools this shift, denies abdominal pain. Dr. Jasso notified and pt placed on enteric precautions. Stool sample to be collected, supplies at bedside. Afib rates 80s-100s. Plan of care continues.

## 2025-08-01 NOTE — PLAN OF CARE
Problem: Dysrhythmia  Goal: Normalized Cardiac Rhythm  Outcome: Progressing     Problem: Heart Failure  Goal: Effective Breathing Pattern During Sleep  Outcome: Progressing  Intervention: Monitor and Manage Obstructive Sleep Apnea  Recent Flowsheet Documentation  Taken 7/31/2025 8975 by Jesenia Kelly RN  Medication Review/Management: medications reviewed   Goal Outcome Evaluation:       A fib w/ BBB, HR 90's-100's. Pt on 2L O2. BP's stable. Pt very confused overnight, disoriented x3 and frequently setting off bed alarm. Became a little agitated when staff tried to redirect, but overall being cooperative. Pt getting up to bathroom w/ 1-2 assist, unsteady and grabbing onto furniture. Pt declined walker. NS running at 50ml/hr. K, Mg, P protocol ran, K and P replaced. K recheck at 1100. Mg and P next AM.

## 2025-08-01 NOTE — PROGRESS NOTES
Care Management Follow Up    Length of Stay (days): 1    Expected Discharge Date: 08/04/2025     Concerns to be Addressed: discharge planning     Patient plan of care discussed at interdisciplinary rounds: Yes    Anticipated Discharge Disposition: Other (Comments) (unknown at this time)       Anticipated Discharge Services: Other (see comment) (unknown at this time, but daughter would like Home Care help if she qualifies at discharge.)  Anticipated Discharge DME: None    Patient/family educated on Medicare website which has current facility and service quality ratings:  yes  Education Provided on the Discharge Plan:  yes  Patient/Family in Agreement with the Plan:  yes    Referrals Placed by CM/SW:   [facility] and [facility]  Private pay costs discussed: Not applicable    Discussed  Partnership in Safe Discharge Planning  document with patient/family: No     Handoff Completed: No, handoff not indicated or clinically appropriate    Additional Information:  MD reported that she thought TCU was most appropriate and OT/PT recs and discussing with family.  Pc to  and discussed recommendations. She reported her mom used to volunteer at [facility] and would love to go there. We put [facility] down as a second. She was hesitant about her mom going too far but might consider [facility] if others dont accept.    Next Steps: Follow up on TCU referrals    Mar Cruz, [facility]  4:20 PM  08/01/25

## 2025-08-01 NOTE — CONSULTS
[facility]    Neurosurgery Consultation     Date of Admission:  7/31/2025  Date of Consult: 08/01/25    Assessment   Chavez Tavares is a 89 year old female with history of HTN, seizure disorder, DLD, PACs, LBBB who was admitted on 7/31/2025 after presenting to the ED from [facility] with new onset a-fib with RVR and dyspnea. At the time patient initially presented to [facility] for evaluation of low back pain which has been ongoing since a fall on 7/29/2025. Patient admitted for rate control and cardiology consultation.     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 Neurosurgery was consulted for severe low back pain and imaging demonstrating Baastrup's disease.    On exam, patient is awake and alert, sitting up in chair in no acute distress.  Family at bedside.   Reports approximately 1 year of lower back pain which has worsened over the last few weeks.  Patient denies any falls, trauma, and/or accidents at the time of initial back pain onset.  Per family, patient did have a fall in August of last year. Reports pain is localized to the low back.  Reports certain activities and movements seem to exacerbate her pain.  Denies any numbness, tingling, and/or new perceived weakness of the bilateral lower extremities.  Denies any radicular pain of the lower extremities.  Denies bowel or bladder incontinence. 5/5 strength throughout bilateral lower extremities.  Sensation intact light touch throughout bilateral lower extremities.  No tenderness to palpation of lumbar spine.    Plan   -No role for urgent/emergent neurosurgical intervention indicated at this time  -Recommend OP physical therapy for back and  "core strengthening exercises  -Pain control measures as needed  -Follow-up outpatient with spine med clinic, referral placed. Discussed option for possible OP injections in the future if pain persists or worsens.   -No need for outpatient NSGY follow-up at this time  -Our team will sign off at this time.  Please reconsult any new neurological concerns.    I have discussed the following assessment and plan with Dr. Nava.     Corinne Fanta MSN, AGACNP-BC  [facility]  [facility]  Text page via [employer]    Code Status    No CPR- Do NOT Intubate    Reason for Consult   I was asked by Tanna Jasso MD to evaluate this patient for \"severe lower back pain.\"    Primary Care Physician   Silvia Jama    Chief Complaint   Back Pain   Shortness of Breath  Fatigue   Decreased Appetite    History is obtained from the patient, chart review, and patient's family.     History of Present Illness   Chavez Tavares is a 89 year old female with history of HTN, seizure disorder, DLD, PACs, LBBB who was admitted on 7/31/2025 after presenting to the ED from [facility] with new onset a-fib with RVR and dyspnea. At the time patient initially presented to [facility] for evaluation of low back pain which has been ongoing since a fall on 7/29/2025. Patient admitted for rate control and cardiology consultation.     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 Neurosurgery was consulted for severe low back pain and imaging demonstrating Baastrup's disease.    On exam, patient is awake and alert, sitting up in chair in no acute distress.  Family at bedside.   Reports approximately 1 year of " lower back pain which has worsened over the last few weeks.  Patient denies any falls, trauma, and/or accidents at the time of initial back pain onset.  Per family, patient did have a fall in August of last year. Reports pain is localized to the low back.  Reports certain activities and movements seem to exacerbate her pain.  Denies any numbness, tingling, and/or new perceived weakness of the bilateral lower extremities.  Denies any radicular pain of the lower extremities.  Denies bowel or bladder incontinence. 5/5 strength throughout bilateral lower extremities.  Sensation intact light touch throughout bilateral lower extremities.  No tenderness to palpation of lumbar spine.    Physical Exam   Vital signs with ranges:   Temp:  [97.5  F (36.4  C)-98.7  F (37.1  C)] 98.7  F (37.1  C)  Pulse:  [] 102  Resp:  [18-29] 22  BP: (107-146)/(57-88) 139/82  SpO2:  [90 %-95 %] 92 %  181 lbs 3.49 oz    NEUROLOGICAL EXAMINATION:   Patient appears comfortable, conversational, and in no apparent distress.   Awake and alert speech is fluent, following commands  Lumbar spine and paraspinous muscles bilaterally non-tender to palpation   Sensation intact to light touch throughout lower extremities.     LE muscle strength  Right  Left    Iliopsoas (hip flexion)  5/5  5/5    Quad (knee extension)  5/5  5/5    Hamstring (knee flexion)  5/5  5/5    Gastrocnemius (PF)  5/5  5/5    Tibialis Ant. (DF)  5/5  5/5    EHL  5/5  5/5      Negative for clonus.     Data   Sodium 135  WBC  6.6  Hemoglobin 11.9  Platelets  81    Results for orders placed or performed during the hospital encounter of 07/31/25   CT Thoracic Spine w/o Contrast    Impression    IMPRESSION:  THORACIC SPINE CT:  1.  No acute fracture or posttraumatic subluxation.  2.  Unchanged chronic compression fracture deformity of the T7 vertebral body.  3.  No high-grade spinal canal or neural foraminal stenosis.  4.  Multilevel spondylosis.    LUMBAR SPINE CT:  1.  No acute  fracture or posttraumatic subluxation.  2.  No high-grade spinal canal or neural foraminal stenosis.  3.  Multilevel spondylosis.  4.  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     CT Abdomen Pelvis w/o Contrast    Impression    IMPRESSION:     1.  Cirrhosis. Stable benign liver cyst inferior right lobe.  2.  Hazy attenuation within the central mesentery consistent with mesenteric congestion.  3.  Few distal colonic diverticula but no acute bowel inflammation or mechanical obstruction.     CT Lumbar Spine Reconstructed    Impression    IMPRESSION:  THORACIC SPINE CT:  1.  No acute fracture or posttraumatic subluxation.  2.  Unchanged chronic compression fracture deformity of the T7 vertebral body.  3.  No high-grade spinal canal or neural foraminal stenosis.  4.  Multilevel spondylosis.    LUMBAR SPINE CT:  1.  No acute fracture or posttraumatic subluxation.  2.  No high-grade spinal canal or neural foraminal stenosis.  3.  Multilevel spondylosis.  4.  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     CT Head w/o Contrast    Impression    IMPRESSION:  1.  No CT finding of a mass, hemorrhage or focal area suggestive of acute infarct.  2.  Stable diffuse small vessel ischemic disease and focal encephalomalacia anterior left basal ganglia and caudate head.  3.  Stable minimal volume loss for age.       Past Medical History   I have reviewed this patient's medical history and updated it with pertinent information if needed.   Past Medical History:   Diagnosis Date    Chronic colitis     Epilepsy (H)     Hypertension     Mixed hyperlipidemia      Past Surgical History   I have reviewed this patient's surgical history and updated it with pertinent information if needed.  Past Surgical History:   Procedure Laterality Date    COLONOSCOPY N/A 7/7/2022    Procedure: COLONOSCOPY;  Surgeon: Vincent Donnelly MD;  Location: [facility] [place] OR    ENT SURGERY      ESOPHAGOSCOPY,  GASTROSCOPY, DUODENOSCOPY (EGD), COMBINED N/A 7/7/2022    Procedure: ESOPHAGOGASTRODUODENOSCOPY WITH;  Surgeon: Vincent Donnelly MD;  Location: [facility] OR    LUMPECTOMY BREAST Right 01/01/2005     Prior to Admission Medications   Prior to Admission Medications   Prescriptions Last Dose Informant Patient Reported? Taking?   Calcium-Magnesium-Vitamin D (CALCIUM 1200+D3 PO) 7/31/2025 Morning  Yes Yes   Sig: Take 1 tablet by mouth every morning.   amLODIPine (NORVASC) 10 MG tablet 7/30/2025 Evening  Yes Yes   Sig: Take 10 mg by mouth every evening.   calcium carbonate (TUMS) 500 MG chewable tablet   Yes Yes   Sig: Take 1 chew tab by mouth as needed for heartburn.   colestipol (COLESTID) 1 g tablet 7/31/2025 Morning  Yes Yes   Sig: Take 1 g by mouth 2 times daily.   hydrochlorothiazide (HYDRODIURIL) 25 MG tablet 7/31/2025 Morning  Yes Yes   Sig: Take 25 mg by mouth every morning.   losartan (COZAAR) 100 MG tablet 7/31/2025 Morning  Yes Yes   Sig: Take 100 mg by mouth every morning.   metoprolol succinate ER (TOPROL XL) 25 MG 24 hr tablet 7/31/2025 Morning  Yes Yes   Sig: Take 2 tablets by mouth every morning.   phenytoin (DILANTIN) 100 MG ER capsule 7/30/2025 Evening  Yes Yes   Sig: Take 100 mg by mouth every evening.   pravastatin (PRAVACHOL) 40 MG tablet 7/30/2025 Evening  Yes Yes   Sig: [PRAVASTATIN (PRAVACHOL) 40 MG TABLET] Take 40 mg by mouth bedtime.   traZODone (DESYREL) 50 MG tablet 7/30/2025 Bedtime  Yes Yes   Sig: Take 2 tablets by mouth at bedtime.      Facility-Administered Medications: None     Allergies   Allergies   Allergen Reactions    Lisinopril Cough    Simvastatin Muscle Pain (Myalgia)    Carbamazepine Rash

## 2025-08-01 NOTE — CONSULTS
[facility]  Cardiology Consultation   Date of Admission:  7/31/2025    Assessment & Plan   Chavez Tavares is a 89 year old female with past medical history of hypertension, seizure disorder, dyslipidemia, PACs, left bundle branch block and more outlined below who was admitted on 7/31/2025 with fall Tuesday evening, dyspnea and back pain.  She was noted to be in atrial fibrillation with rapid ventricular response.  CT without evidence of PE or pulmonary edema but was found of mild pleural effusions.  Her potassium was 2.8 upon arrival magnesium 1.9.  After correction of this her heart rate improved to the 90s and her systolic pressures improved as well.    New onset atrial fibrillation, atrial fibrillation with rapid ventricular response, in the setting of a few weeks of shortness of breath and fatigue.  Potassium 2.8 and magnesium 1.9 and LUCI. After repletion of electrolytes and rehydration, heart rates improve and BP improved.  She has a known history of PACs and PVCs.  In November 2020 for LV systolic performance was normal and her stress test was negative for distal ischemia or infarction. Rates now controled. [id number] at least 4 for age, sex and HTN history.  Regarding the risk and benefits of potential oral anticoagulation therapy, she does have increased risk for VTE based on after mentioned risk factors, however, she did have a recent unprovoked fall for unclear causes.  Fortunately no head trauma.  I would like to discuss this topic when her family is present.  Hypokalemia, resolved  Hypomagnesemia, resolved  Pleural effusions, mild based on CT. No hypoia.   Left bundle branch block, no  Elevated NT proBNP in the setting of signs of dehydration and hyponatremia as well as LUCI and elevated BUN.  She was treated yesterday with albumin and gentle fluids.  She remains of 1 to 2 L of nasal cannula today.  No orthopnea or leg swelling to suggest heart failure.  Her symptoms have not  worsened with the administration of fluids, which is reassuring.  LUCI, creatinine of 1.3, baseline around 1.  She received IV fluids at urgent care prior to admission and gentle rehydration overnight.  This morning her renal function has improved to baseline.    Plan:  Increase Toprol XL from 25 mg daily to 50 mg daily, received 25 mg late yesterday evening which is essentially 75 mg daily  HR goal <110  bpm  Addendum: HR not at goal this afternoon. Will administer metoprolol tartrate 25 mg q 6 hours x 2, then increase Toprol XL to 100 mg daily tomrorow   TTE today  Do not recommend diuresis  Maintain K >4 and Mag >2, ordered 40 meQ Kcl this morning; hydrochlorothiazide may not be the best medication for this patient   Will discuss anticoagulation with the patient with her family present, please call me when patient is present today  Addendum: I discussed anticoagulation directly with the patient and her son, Cash, who actually has atrial fibrillation and recently discontinued his anticoagulation.  We discussed the risk and benefits of oral anticoagulation in the setting of atrial fibrillation and her risk factors for VTE including age, hypertension, and sex.  We also discussed her risk of bleeding including including her age, fragility and recent falls.  Through shared decision making with the patient, her son and myself, they have elected to defer anticoagulation at this time.      Moderate complexity     Vaishali James PA-C    Primary Care Physician   Silvia Jama    Reason for Consult   Reason for consult: I was asked by internal medicine to evaluate this patient for new onset atrial fibrillation with rapid ventricular response.    History of Present Illness   Chavez Tavares is a 89 year old female who presents with back pain and shortness of breath noted to have recent fall, found to have atrial fibrillation with rapid ventricular response.  States she had worsening back pain over the several last weeks after  a fall multiple nights ago that exacerbated her back pain.  No numbness or tingling in the extremities.  Also several weeks of increasing shortness of breath and fatigue worsened acutely over the past few days.  She reports that the shortness of breath has improved today.  Denies chest pain or palpitations, no orthopnea or PND.  Patient son report that even at times the patient difficulty speaking and complete sentences.    Patient was seen in November 2024 for near syncope.  She TTE, echocardiogram EEG were all reassuring.  Symptoms felt to be related to dehydration and and home thiazide diuretic was discontinued.  Holter monitor around that time demonstrated a 25% burden of PVC while on beta-blocker.    Prior to admission medications: Hydrochlorothiazide 25 mg daily, losartan 100 mg daily, Toprol-XL 25 mg daily    Diagnostics Reviewed:  November 2024 echocardiogram:  The left ventricle is normal in size with moderate concentric left ventricular  hypertrophy.  Left ventricular function is normal.The ejection fraction is 60-65%.  Normal right ventricle size and systolic function.  No hemodynamically significant valvular abnormalities on 2D or color flow  imaging.  There is no comparison study available.    November 2024 stress test: Lexiscan stress test and rest images demonstrate normal left ventricular size and uptake pattern of the tracer.  The gated images revealed normal resting regional wall motion and global Systolic performance.  Lexiscan stress test is negative for inducible myocardial ischemia or infarction    CT Head 7/30/2025:  IMPRESSION:  1.  No CT finding of a mass, hemorrhage or focal area suggestive of acute infarct.  2.  Stable diffuse small vessel ischemic disease and focal encephalomalacia anterior left basal ganglia and caudate head.  3.  Stable minimal volume loss for age.    CT Thoracic Spine 7/31/2025:  IMPRESSION:  THORACIC SPINE CT:  1.  No acute fracture or posttraumatic subluxation.  2.   Unchanged chronic compression fracture deformity of the T7 vertebral body.  3.  No high-grade spinal canal or neural foraminal stenosis.  4.  Multilevel spondylosis.     LUMBAR SPINE CT:  1.  No acute fracture or posttraumatic subluxation.  2.  No high-grade spinal canal or neural foraminal stenosis.  3.  Multilevel spondylosis.  4.  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       Past Medical History   Past Medical History:   Diagnosis Date    Chronic colitis     Epilepsy (H)     Hypertension     Mixed hyperlipidemia          Past Surgical History   Past Surgical History:   Procedure Laterality Date    COLONOSCOPY N/A 7/7/2022    Procedure: COLONOSCOPY;  Surgeon: Vincent Donnelly MD;  [place] [facility] [place]GERY      ESOPHAGOSCOPY, GASTROSCOPY, DUODENOSCOPY (EGD), COMBINED N/A 7/7/2022    Procedure: ESOPHAGOGASTRODUODENOSCOPY WITH;  Surgeon: Vincent Donnelly MD;  [place] [facility] [place]    LUMPECTOMY BREAST Right 01/01/2005         Prior to Admission Medications   Prior to Admission Medications   Prescriptions Last Dose Informant Patient Reported? Taking?   Calcium-Magnesium-Vitamin D (CALCIUM 1200+D3 PO) 7/31/2025 Morning  Yes Yes   Sig: Take 1 tablet by mouth every morning.   amLODIPine (NORVASC) 10 MG tablet 7/30/2025 Evening  Yes Yes   Sig: Take 10 mg by mouth every evening.   calcium carbonate (TUMS) 500 MG chewable tablet   Yes Yes   Sig: Take 1 chew tab by mouth as needed for heartburn.   colestipol (COLESTID) 1 g tablet 7/31/2025 Morning  Yes Yes   Sig: Take 1 g by mouth 2 times daily.   hydrochlorothiazide (HYDRODIURIL) 25 MG tablet 7/31/2025 Morning  Yes Yes   Sig: Take 25 mg by mouth every morning.   losartan (COZAAR) 100 MG tablet 7/31/2025 Morning  Yes Yes   Sig: Take 100 mg by mouth every morning.   metoprolol succinate ER (TOPROL XL) 25 MG 24 hr tablet 7/31/2025 Morning  Yes Yes   Sig: Take 2 tablets by mouth every morning.   phenytoin (DILANTIN)  100 MG ER capsule 7/30/2025 Evening  Yes Yes   Sig: Take 100 mg by mouth every evening.   pravastatin (PRAVACHOL) 40 MG tablet 7/30/2025 Evening  Yes Yes   Sig: [PRAVASTATIN (PRAVACHOL) 40 MG TABLET] Take 40 mg by mouth bedtime.   traZODone (DESYREL) 50 MG tablet 7/30/2025 Bedtime  Yes Yes   Sig: Take 2 tablets by mouth at bedtime.      Facility-Administered Medications: None     Current Facility-Administered Medications   Medication Dose Route Frequency Provider Last Rate Last Admin    acetaminophen (TYLENOL) tablet 975 mg  975 mg Oral TID Halina Mason PA-C   975 mg at 08/01/25 0851    [Held by provider] amLODIPine (NORVASC) tablet 10 mg  10 mg Oral QPM Halina Mason PA-C        calcium carbonate (TUMS) chewable tablet 1,000 mg  1,000 mg Oral 4x Daily PRN Halina Mason PA-C        cefTRIAXone (ROCEPHIN) 1 g vial to attach to  mL bag for ADULTS or NS 50 mL bag for PEDS  1 g Intravenous Q24H Tanna Jasso MD   1 g at 08/01/25 0848    colestipol (COLESTID) tablet 1 g  1 g Oral BID Halina Mason PA-C   1 g at 08/01/25 0853    folic acid (FOLVITE) tablet 1 mg  1 mg Oral Daily Tanna Jasso MD   1 mg at 08/01/25 0955    [Held by provider] hydrochlorothiazide (HYDRODIURIL) tablet 25 mg  25 mg Oral QAM Halina Mason PA-C        Lidocaine (LIDOCARE) 4 % Patch 1 patch  1 patch Transdermal Daily PRN Halina Mason PA-C        lidocaine (LMX4) cream   Topical Q1H PRN aHlina Mason PA-C        lidocaine 1 % 0.1-1 mL  0.1-1 mL Other Q1H PRN Halina Mason PA-C        [Held by provider] losartan (COZAAR) tablet 100 mg  100 mg Oral QAM Halina Mason PA-C        metoprolol (LOPRESSOR) injection 5 mg  5 mg Intravenous Q5 Min PRN Halina Mason PA-C        metoprolol succinate ER (TOPROL XL) 24 hr tablet 50 mg  50 mg Oral Vaishali Toney PA-C   50 mg at 08/01/25 0849    naloxone (NARCAN) injection 0.2 mg  0.2 mg Intravenous Q2 Min PRN Gondal, Saad J, MD        Or    naloxone (NARCAN) injection 0.4 mg  0.4 mg Intravenous Q2  Min PRN Gondal, Saad J, MD        Or    naloxone (NARCAN) injection 0.2 mg  0.2 mg Intramuscular Q2 Min PRN Gondal, Saad J, MD        Or    naloxone (NARCAN) injection 0.4 mg  0.4 mg Intramuscular Q2 Min PRN Gondal, Saad J, MD        ondansetron (ZOFRAN ODT) ODT tab 4 mg  4 mg Oral Q6H PRN Halina Mason PA-C        Or    ondansetron (ZOFRAN) injection 4 mg  4 mg Intravenous Q6H PRN Halina Mason PA-C        oxyCODONE (ROXICODONE) tablet 5 mg  5 mg Oral Q4H PRN Halina Mason PA-C        oxyCODONE IR (ROXICODONE) half-tab 2.5 mg  2.5 mg Oral Q4H PRN Halina Mason PA-C        phenytoin (DILANTIN) ER capsule 100 mg  100 mg Oral QPM Halina Mason PA-C   100 mg at 07/31/25 2146    polyethylene glycol (MIRALAX) Packet 17 g  17 g Oral BID PRN Halina Mason PA-C        potassium & sodium phosphates (NEUTRA-PHOS) Packet 1 packet  1 packet Oral or Feeding Tube Q4H Gondal, Saad J, MD   1 packet at 08/01/25 0954    pravastatin (PRAVACHOL) tablet 40 mg  40 mg Oral At Bedtime Halina Mason PA-C   40 mg at 07/31/25 2146    prochlorperazine (COMPAZINE) injection 5 mg  5 mg Intravenous Q6H PRN Halina Mason PA-C        Or    prochlorperazine (COMPAZINE) tablet 5 mg  5 mg Oral Q6H PRN Halina Mason PA-C        senna-docusate (SENOKOT-S/PERICOLACE) 8.6-50 MG per tablet 1 tablet  1 tablet Oral BID PRN Halina Mason PA-C        Or    senna-docusate (SENOKOT-S/PERICOLACE) 8.6-50 MG per tablet 2 tablet  2 tablet Oral BID PRN Halina Mason PA-C        sodium chloride (PF) 0.9% PF flush 3 mL  3 mL Intracatheter Q8H LONDON Halina Mason PA-C   3 mL at 08/01/25 0551    sodium chloride (PF) 0.9% PF flush 3 mL  3 mL Intracatheter q1 min prn Halina Mason PA-C        thiamine (B-1) injection 100 mg  100 mg Intravenous Daily Tanna Jasso MD   100 mg at 08/01/25 0955    traZODone (DESYREL) tablet 100 mg  100 mg Oral At Bedtime Halina Mason PA-C   100 mg at 07/31/25 1644     Current Facility-Administered Medications   Medication Dose Route Frequency Provider  Last Rate Last Admin    acetaminophen (TYLENOL) tablet 975 mg  975 mg Oral TID Halina Mason PA-C   975 mg at 08/01/25 0851    [Held by provider] amLODIPine (NORVASC) tablet 10 mg  10 mg Oral QPM Halina Mason PA-C        calcium carbonate (TUMS) chewable tablet 1,000 mg  1,000 mg Oral 4x Daily PRN Halina Mason PA-C        cefTRIAXone (ROCEPHIN) 1 g vial to attach to  mL bag for ADULTS or NS 50 mL bag for PEDS  1 g Intravenous Q24H Tanna Jasso MD   1 g at 08/01/25 0848    colestipol (COLESTID) tablet 1 g  1 g Oral BID Halina Mason PA-C   1 g at 08/01/25 0853    folic acid (FOLVITE) tablet 1 mg  1 mg Oral Daily Tanna Jasso MD   1 mg at 08/01/25 0955    [Held by provider] hydrochlorothiazide (HYDRODIURIL) tablet 25 mg  25 mg Oral JOSEPHM Halina Mason PA-C        Lidocaine (LIDOCARE) 4 % Patch 1 patch  1 patch Transdermal Daily PRN Halina Mason PA-C        lidocaine (LMX4) cream   Topical Q1H PRN Halina Mason PA-C        lidocaine 1 % 0.1-1 mL  0.1-1 mL Other Q1H PRN Halina Mason PA-C        [Held by provider] losartan (COZAAR) tablet 100 mg  100 mg Oral Halina Suarez PA-C        metoprolol (LOPRESSOR) injection 5 mg  5 mg Intravenous Q5 Min PRN Halina Mason PA-C        metoprolol succinate ER (TOPROL XL) 24 hr tablet 50 mg  50 mg Oral Vaishali Toney PA-C   50 mg at 08/01/25 0849    naloxone (NARCAN) injection 0.2 mg  0.2 mg Intravenous Q2 Min PRN Gondal, Saad J, MD        Or    naloxone (NARCAN) injection 0.4 mg  0.4 mg Intravenous Q2 Min PRN Gondal, Saad J, MD        Or    naloxone (NARCAN) injection 0.2 mg  0.2 mg Intramuscular Q2 Min PRN Gondal, Saad J, MD        Or    naloxone (NARCAN) injection 0.4 mg  0.4 mg Intramuscular Q2 Min PRN Gondal, Saad J, MD        ondansetron (ZOFRAN ODT) ODT tab 4 mg  4 mg Oral Q6H PRN Halina Mason PA-C        Or    ondansetron (ZOFRAN) injection 4 mg  4 mg Intravenous Q6H PRN Halina Mason PA-C        oxyCODONE (ROXICODONE) tablet 5 mg  5 mg Oral Q4H PRN  Halina Mason PA-C        oxyCODONE IR (ROXICODONE) half-tab 2.5 mg  2.5 mg Oral Q4H PRN Halina Mason PA-C        phenytoin (DILANTIN) ER capsule 100 mg  100 mg Oral QPM Halina Mason PA-C   100 mg at 07/31/25 2146    polyethylene glycol (MIRALAX) Packet 17 g  17 g Oral BID PRN Hlaina Mason PA-C        potassium & sodium phosphates (NEUTRA-PHOS) Packet 1 packet  1 packet Oral or Feeding Tube Q4H Gondal, Saad J, MD   1 packet at 08/01/25 0954    pravastatin (PRAVACHOL) tablet 40 mg  40 mg Oral At Bedtime Halina Mason PA-C   40 mg at 07/31/25 2146    prochlorperazine (COMPAZINE) injection 5 mg  5 mg Intravenous Q6H PRN Halina Mason PA-C        Or    prochlorperazine (COMPAZINE) tablet 5 mg  5 mg Oral Q6H PRN Halina Mason PA-C        senna-docusate (SENOKOT-S/PERICOLACE) 8.6-50 MG per tablet 1 tablet  1 tablet Oral BID PRN Halina Mason PA-C        Or    senna-docusate (SENOKOT-S/PERICOLACE) 8.6-50 MG per tablet 2 tablet  2 tablet Oral BID PRN Halina Mason PA-C        sodium chloride (PF) 0.9% PF flush 3 mL  3 mL Intracatheter Q8H LONDON Halina aMson PA-C   3 mL at 08/01/25 0551    sodium chloride (PF) 0.9% PF flush 3 mL  3 mL Intracatheter q1 min prn Halina Mason PA-C        thiamine (B-1) injection 100 mg  100 mg Intravenous Daily Tanna Jasso MD   100 mg at 08/01/25 0955    traZODone (DESYREL) tablet 100 mg  100 mg Oral At Bedtime Halina Mason PA-C   100 mg at 07/31/25 2146     Allergies   Allergies   Allergen Reactions    Lisinopril Cough    Simvastatin Muscle Pain (Myalgia)    Carbamazepine Rash       Social History    reports that she quit smoking about 24 years ago. She does not have any smokeless tobacco history on file.      Family History   I have reviewed this patient's family history and updated it with pertinent information if needed.  Family History   Problem Relation Age of Onset    Coronary Artery Disease Mother     Coronary Artery Disease Father           Review of Systems   A comprehensive review of  "system was performed and is negative other than that noted in the HPI or here.     Physical Exam   Vital Signs with Ranges  Temp:  [97.5  F (36.4  C)-98.7  F (37.1  C)] 98.7  F (37.1  C)  Pulse:  [] 102  Resp:  [18-29] 22  BP: (107-146)/(57-88) 139/82  SpO2:  [90 %-95 %] 92 %  Wt Readings from Last 4 Encounters:   07/31/25 82.2 kg (181 lb 3.5 oz)   11/02/24 79.9 kg (176 lb 3.2 oz)   07/07/22 72.8 kg (160 lb 9.6 oz)     I/O last 3 completed shifts:  In: 1203 [P.O.:360; I.V.:493; IV Piggyback:150]  Out: 1200 [Urine:1200]      Vitals: /82   Pulse 102   Temp 98.7  F (37.1  C) (Oral)   Resp 22   Ht 1.676 m (5' 6\")   Wt 82.2 kg (181 lb 3.5 oz)   SpO2 92%   BMI 29.25 kg/m      Physical Exam:   General - Alert and oriented to time place and person in no acute distress  Eyes - No scleral icterus  HEENT - Neck supple, moist mucous membranes  Cardiovascular - irregularly irregular no murmur  Extremities - There is no edema  Respiratory - breathing non labored on oxygen   Skin - No pallor or cyanosis  Gastrointestinal - Non tender and non distended without rebound or guarding  Psych - Appropriate affect   Neurological - No gross motor neurological focal deficits    No lab results found in last 7 days.    Invalid input(s): \"TROPONINIES\"    Recent Labs   Lab 08/01/25  0649 08/01/25  0442 07/31/25  1821 07/31/25  1425   WBC  --  6.6  --  8.8   HGB  --  11.9  --  13.1   MCV  --  92  --  93   PLT  --  81*  --  101*   INR 1.17*  --   --  1.11   NA  --  135  --  131*   POTASSIUM  --  3.4 3.6 2.8*   CHLORIDE  --  100  --  94*   CO2  --  24  --  25   BUN  --  22.5  --  30.0*   CR  --  1.00*  --  1.29*   GFRESTIMATED  --  52*  --  38*   ANIONGAP  --  11  --  12   SONG  --  8.3*  --  7.9*   GLC  --  103*  --  121*   ALBUMIN 3.7  --   --  3.2*   PROTTOTAL 6.1*  --   --  6.0*   BILITOTAL 0.4  --   --  0.3   ALKPHOS 69  --   --  72   ALT 12  --   --  14   AST 19  --   --  25     No results for input(s): \"CHOL\", \"HDL\", " "\"LDL\", \"TRIG\", \"CHOLHDLRATIO\" in the last 83418 hours.  Recent Labs   Lab 08/01/25  0442 07/31/25  1425   WBC 6.6 8.8   HGB 11.9 13.1   HCT 34.0* 38.4   MCV 92 93   PLT 81* 101*     No results for input(s): \"PH\", \"PHV\", \"PO2\", \"PO2V\", \"SAT\", \"PCO2\", \"PCO2V\", \"HCO3\", \"HCO3V\" in the last 168 hours.  Recent Labs   Lab 07/31/25  1425   NTBNP 5,038*     No results for input(s): \"DD\" in the last 168 hours.  No results for input(s): \"SED\", \"CRP\" in the last 168 hours.  Recent Labs   Lab 08/01/25  0442 07/31/25  1425   PLT 81* 101*     Recent Labs   Lab 07/31/25  1425   TSH 2.16     Recent Labs   Lab 07/31/25  1945   COLOR Light Yellow   APPEARANCE Clear   URINEGLC Negative   URINEBILI Negative   URINEKETONE Trace*   SG 1.030   UBLD Negative   URINEPH 5.0   PROTEIN Negative   NITRITE Negative   LEUKEST 500 Yonatan/uL*   RBCU 3*   WBCU 21*       Imaging:  Recent Results (from the past 48 hours)   CTA Chest Redo Sternotomy Planning    Narrative    For Patients: As a result of the 21st Century Cures Act, medical imaging exams and procedure reports are released immediately into your electronic medical record. You may view this report before your referring provider. If you have questions, please contact your health care provider.    EXAM: CTA CHEST  LOCATION: [facility]  DATE: 7/31/2025    INDICATION: positive dimer, shortness of breath  COMPARISON: 11/1/2024  TECHNIQUE: CT chest pulmonary angiogram during arterial phase injection of IV contrast. Multiplanar reformats and MIP reconstructions were performed. Dose reduction techniques were used.   CONTRAST: IOPAMIDOL 300 MG/ML  ML BOTTLE: 50mL    FINDINGS:  ANGIOGRAM CHEST: Pulmonary arteries are normal caliber and negative for pulmonary emboli. Thoracic aorta is negative for dissection. No CT evidence of right heart strain.    LUNGS AND PLEURA: Trace bilateral effusions with bibasilar atelectasis. Small linear areas of atelectasis and scarring are noted " bilaterally. Biapical pleural-parenchymal scarring.    MEDIASTINUM/AXILLAE: Heart is moderately enlarged. Subcentimeter mediastinal lymph nodes are noted. Mildly prominent right hilar lymph node measuring 1.1 cm short axis.    CORONARY ARTERY CALCIFICATION: Moderate.    UPPER ABDOMEN: Cirrhotic liver. Partially visualized gallbladder is distended.    MUSCULOSKELETAL: Degenerative changes of the spine.    Impression    1.  No pulmonary embolism.  2.  Trace bilateral effusions with bibasilar atelectasis.  3.  Cirrhotic liver.  4.  Stable mildly prominent right hilar lymph nodes which is nonspecific.   CT Thoracic Spine w/o Contrast    Narrative    EXAM: CT THORACIC SPINE W/O CONTRAST, CT LUMBAR SPINE RECONSTRUCTED  LOCATION: [facility]  DATE: 7/31/2025    INDICATION: fall, back pain  COMPARISON: Lumbar spine CT dated 10/23/2024. Chest CT dated 08/08/2024.  TECHNIQUE:  1) Routine CT Thoracic Spine without IV contrast. Multiplanar reformats. Dose reduction techniques were used.   2) Routine CT Lumbar Spine without IV contrast. Multiplanar reformats. Dose reduction techniques were used.     FINDINGS:    THORACIC SPINE CT:  VERTEBRA: Diffuse osteopenia. Unchanged moderate anterior superior endplate height loss of the T7 vertebral body. The remaining vertebral body heights are maintained. Minimal retrolisthesis at T7-T8 is unchanged. Disc height loss, degenerative endplate   changes and anterior osteophytes throughout the thoracic spine. No acute fracture or posttraumatic subluxation.     CANAL/FORAMINA: Small disc osteophyte complexes at multiple levels. No high-grade spinal canal or neural foraminal stenosis.    PARASPINAL: Trace bilateral pleural effusions. Biapical scarring. Bilateral dependent atelectasis. Coronary calcifications. Atherosclerotic calcifications of the aortic arch and great vessels.    LUMBAR SPINE CT:  VERTEBRA: Vertebral body heights are maintained. Mild stepwise  retrolisthesis from L1-L4 is unchanged and likely degenerative in nature. No acute fracture or posttraumatic subluxation.     CANAL/FORAMINA: Disc osteophyte complexes contribute to mild spinal canal narrowing at L2-L3, L3-L4 and L4-L5. Multilevel facet arthropathy, most pronounced at L4-L5 and L5-S1. Mild neural foraminal stenosis at multiple levels.    PARASPINAL: No acute extraspinal abnormality. Degenerative changes of the apposing surfaces of the spinous processes at L3-L4 and L4-L5 is suggestive of Baastrup's disease. Degenerative changes of the sacroiliac joints. Atrophy of the paravertebral   muscles. Atherosclerotic calcifications of the abdominal aorta and branching vessels.      Impression    IMPRESSION:  THORACIC SPINE CT:  1.  No acute fracture or posttraumatic subluxation.  2.  Unchanged chronic compression fracture deformity of the T7 vertebral body.  3.  No high-grade spinal canal or neural foraminal stenosis.  4.  Multilevel spondylosis.    LUMBAR SPINE CT:  1.  No acute fracture or posttraumatic subluxation.  2.  No high-grade spinal canal or neural foraminal stenosis.  3.  Multilevel spondylosis.  4.  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     CT Abdomen Pelvis w/o Contrast    Narrative    EXAM: CT ABDOMEN PELVIS W/O CONTRAST  LOCATION: [facility]  DATE: 7/31/2025    INDICATION: fall, pain, new cirrhosis  COMPARISON: CT angiogram of the chest, earlier today; MRI of the abdomen 3/3/2022  TECHNIQUE: CT scan of the abdomen and pelvis was performed without IV contrast. Multiplanar reformats were obtained. Dose reduction techniques were used.  CONTRAST: None.    FINDINGS:   LOWER CHEST: Moderate coronary calcifications and calcification of the aortic root. Trace pleural fluid.    HEPATOBILIARY: Liver is small with diffuse nodular contour consistent with cirrhosis. There is a circumscribed 2.6 cm cyst along the inferior right liver capsule (series 3, image  37). The gallbladder is distended. No gallbladder wall thickening or   pericholecystic inflammation. No bile duct enlargement.    PANCREAS: Normal.    SPLEEN: Normal.    ADRENAL GLANDS: Normal.    KIDNEYS/BLADDER: Bilateral parapelvic renal cysts are present and do not require imaging workup or follow-up. Excreted contrast is present in nondilated collecting systems and the upper ureters. No nephrolithiasis or hydronephrosis. There is a urine   contrast level in the urinary bladder. No bladder wall thickening or filling defects.    BOWEL: There are a few sigmoid diverticula. No dilated bowel or bowel wall thickening. There is a 13 x 23 mm fat attenuation lesion within the wall of the central small bowel (series 4, image 126) consistent with a benign lipoma. No peritoneal thickening   or ascites.    LYMPH NODES: There is hazy attenuation within the central mesentery suggesting mesenteric congestion. No enlarged lymph nodes in the abdomen or pelvis.    VASCULATURE: Severe atheromatous calcification of the abdominal aorta with lesser involvement of the iliac and common femoral arteries. No aneurysm.    PELVIC ORGANS: Normal.    MUSCULOSKELETAL: Generalized bone demineralization. Small to moderate degenerative osteophytes and related disc space narrowing. Mild lumbar facet arthropathy. No aggressive or destructive bone lesions.      Impression    IMPRESSION:     1.  Cirrhosis. Stable benign liver cyst inferior right lobe.  2.  Hazy attenuation within the central mesentery consistent with mesenteric congestion.  3.  Few distal colonic diverticula but no acute bowel inflammation or mechanical obstruction.     CT Lumbar Spine Reconstructed    Narrative    EXAM: CT THORACIC SPINE W/O CONTRAST, CT LUMBAR SPINE RECONSTRUCTED  LOCATION: [facility]  DATE: 7/31/2025    INDICATION: fall, back pain  COMPARISON: Lumbar spine CT dated 10/23/2024. Chest CT dated 08/08/2024.  TECHNIQUE:  1) Routine CT Thoracic  Spine without IV contrast. Multiplanar reformats. Dose reduction techniques were used.   2) Routine CT Lumbar Spine without IV contrast. Multiplanar reformats. Dose reduction techniques were used.     FINDINGS:    THORACIC SPINE CT:  VERTEBRA: Diffuse osteopenia. Unchanged moderate anterior superior endplate height loss of the T7 vertebral body. The remaining vertebral body heights are maintained. Minimal retrolisthesis at T7-T8 is unchanged. Disc height loss, degenerative endplate   changes and anterior osteophytes throughout the thoracic spine. No acute fracture or posttraumatic subluxation.     CANAL/FORAMINA: Small disc osteophyte complexes at multiple levels. No high-grade spinal canal or neural foraminal stenosis.    PARASPINAL: Trace bilateral pleural effusions. Biapical scarring. Bilateral dependent atelectasis. Coronary calcifications. Atherosclerotic calcifications of the aortic arch and great vessels.    LUMBAR SPINE CT:  VERTEBRA: Vertebral body heights are maintained. Mild stepwise retrolisthesis from L1-L4 is unchanged and likely degenerative in nature. No acute fracture or posttraumatic subluxation.     CANAL/FORAMINA: Disc osteophyte complexes contribute to mild spinal canal narrowing at L2-L3, L3-L4 and L4-L5. Multilevel facet arthropathy, most pronounced at L4-L5 and L5-S1. Mild neural foraminal stenosis at multiple levels.    PARASPINAL: No acute extraspinal abnormality. Degenerative changes of the apposing surfaces of the spinous processes at L3-L4 and L4-L5 is suggestive of Baastrup's disease. Degenerative changes of the sacroiliac joints. Atrophy of the paravertebral   muscles. Atherosclerotic calcifications of the abdominal aorta and branching vessels.      Impression    IMPRESSION:  THORACIC SPINE CT:  1.  No acute fracture or posttraumatic subluxation.  2.  Unchanged chronic compression fracture deformity of the T7 vertebral body.  3.  No high-grade spinal canal or neural foraminal  stenosis.  4.  Multilevel spondylosis.    LUMBAR SPINE CT:  1.  No acute fracture or posttraumatic subluxation.  2.  No high-grade spinal canal or neural foraminal stenosis.  3.  Multilevel spondylosis.  4.  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     CT Head w/o Contrast    Narrative    EXAM: CT HEAD W/O CONTRAST  LOCATION: [facility]  DATE: 7/31/2025    INDICATION: Fall at home with head injury  COMPARISON: 11/1/2024.  TECHNIQUE: Routine CT Head without IV contrast. Multiplanar reformats. Dose reduction techniques were used.    FINDINGS:  INTRACRANIAL CONTENTS: No intracranial hemorrhage, extraaxial collection, or mass effect.  No CT evidence of acute infarct. There is diffuse small vessel disease ischemia disease. There is stable focal encephalomalacia of the anterior left basal ganglia   and caudate head. The ventricular system, basal cisterns and the cortical sulci are consistent with minimal volume loss for age.     VISUALIZED ORBITS/SINUSES/MASTOIDS: No intraorbital abnormality. No paranasal sinus mucosal disease. No middle ear or mastoid effusion.    BONES/SOFT TISSUES: No acute abnormality.      Impression    IMPRESSION:  1.  No CT finding of a mass, hemorrhage or focal area suggestive of acute infarct.  2.  Stable diffuse small vessel ischemic disease and focal encephalomalacia anterior left basal ganglia and caudate head.  3.  Stable minimal volume loss for age.         Clinically Significant Risk Factors Present on Admission        # Hypokalemia: Lowest K = 2.8 mmol/L in last 2 days, will replace as needed  # Hyponatremia: Lowest Na = 131 mmol/L in last 2 days, will monitor as appropriate  # Hypochloremia: Lowest Cl = 94 mmol/L in last 2 days, will monitor as appropriate  # Hypocalcemia: Lowest Ca = 7.9 mg/dL in last 2 days, will monitor and replace as appropriate     # Hypoalbuminemia: Lowest albumin = 3.2 g/dL at 7/31/2025  2:25 PM, will monitor as  "appropriate  # Coagulation Defect: INR = 1.17 (Ref range: 0.85 - 1.15) and/or PTT = N/A, will monitor for bleeding  # Thrombocytopenia: Lowest platelets = 81 in last 2 days, will monitor for bleeding   # Hypertension: Home medication list includes antihypertensive(s)           # Overweight: Estimated body mass index is 29.25 kg/m  as calculated from the following:    Height as of this encounter: 1.676 m (5' 6\").    Weight as of this encounter: 82.2 kg (181 lb 3.5 oz).         # Financial/Environmental Concerns: none        Cardiac Arrhythmia: Atrial fibrillation: Unspecified      Hypokalemia            "

## 2025-08-01 NOTE — CONSULTS
8/1- Test claim for DOAC'S- First 30 days or first fill of either Eliquis or Xarelto will be $291 while meeting a unmet deductible, there after the monthly cost will be $47 until the end of the year. Thank you for allowing me to help with your patient  [facility]  Pharmacy Discharge Liaison   [facility] locations  Available on [facility] and [facility]

## 2025-08-01 NOTE — PROGRESS NOTES
[facility]    Medicine Progress Note - Hospitalist Service    Date of Admission:  7/31/2025    Assessment & Plan     Chavez Tavares is a 89 year old female with PMHx of HTN, seizure disorder, DLD, PACs, LBBB who was admitted on 7/31/2025. She initially presented to ER for evaluation of back pain and SOB, sent to ER for further evaluation and management of new A fib with RVR      1.Afib with RVR -new  -Elevated Troponin , no cp  -- EKG atrial fib with LBBB (known since 2024)  -- Trop 49 ; 48, 45  -- Added Toprol 25 mg now and increase PTA Toprol to 75 mg daily   -- PRN IV Lopressor for HR > 110  -- HOLD PTA amlodipine given softer pressures at admit   -- Cardiac monitoring   -- Cardiology consult   -- Electrolyte replacement protocols   -- CHADS score of at least 4 - I am concerned with thrombocytopenia and falls--cards to eval  -will also do referral for sleep eval(family notes snoring loud and never tested for SATISH)     2.Elevated BNP  -stop ivf  -echo  -rate control afib  -will not be surprised if EF lower with afib  -cards to see     3.LUCI   -- Cr 1.29 ; baseline appears to be around 1 --back to baseline, stop ivf now  -- Hold PTA losartan and hydrochlorothiazide   -- Trend BMP      4.Hyponatremia   Hypochloremia   Na 131, Cl 94, BUN 30--now   -- Endorses poor PO intake over the past several days   -stop ivf     5.Recurrent Falls at Home   Acute on Chronic Low Back Pain   -- CT T and L spine showed unchanged chronic compression fracture of T7 vertebral body, 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--will ask spine to see   -- CT Head ordered    -- Pain control with scheduled Tylenol, lido patch, low dose PRN oxycodone   -- PT/OT/CM  -- Fall precautions     6.Shortness of Breath   Fatigue   Deconditioning   Endorses SOB  Likely multifactorial in the setting of Afib with RVR,  -- Management of Afib as above  -- CT chest negative for PE   -- Checked respiratory panel -neg  -- Monitor for  hypoxia  -- PT/OT/CM      7.Elevated Inflammatory Markers   .3   -- Normal WBC and procal   -- Afebrile   -- Follow-up Bcx   -- Follow-up respiratory panel   -- Trend CBC, CRP  --treat uti  --I suspect this could be up from severe spine changes       8.Hypomagnesemia - replacement protocols   Hypokalemia - replacement protocols      9.Essential HTN   -- HOLD PTA losartan and hydrochlorothiazide given LUCI and hypokalemia   -- Continue  metoprolol higher dose    10.Abnl UA  -concern uti  -ceftriaxone and check UC    11.Acute encephalopathy  -could be toxic uti, also at risk age, hospital delirium  -1:1  -treat uti  -check ammonia   -head ct neg acute  -will also ask neuro to see with zoran gaspar , check eeg    12.Cirrhosis on scan  -not clear if this was known,   -check LFT's and inr, ammonia  -likely causing low plt  -I did speak to family and they were aware and pt still drinks wine  -start thiamine and folic acid  Watch for withdrawal symptoms  -told family she needs to stop    13.Thrombocytopenia  -likely from above  -trend    14.HLD - continue PTA statin and colestipol     15.History of Seizure Disorder - continue PTA phenytoin        Updated family in [place]  Son and 2 daughters  Going forward  will be main contact     -at 1445 updated by RN 5 loose stools  -check stool studies now            Diet: Combination Diet Low Saturated Fat Sodium <2400mg Diet    DVT Prophylaxis: Pneumatic Compression Devices  Harris Catheter: Not present  Lines: None     Cardiac Monitoring: ACTIVE order. Indication: Tachyarrhythmias, acute (48 hours)  Code Status: No CPR- Do NOT Intubate      Clinically Significant Risk Factors Present on Admission        # Hypokalemia: Lowest K = 2.8 mmol/L in last 2 days, will replace as needed  # Hyponatremia: Lowest Na = 131 mmol/L in last 2 days, will monitor as appropriate  # Hypochloremia: Lowest Cl = 94 mmol/L in last 2 days, will monitor as appropriate  # Hypocalcemia: Lowest Ca = 7.9 mg/dL  "in last 2 days, will monitor and replace as appropriate     # Hypoalbuminemia: Lowest albumin = 3.2 g/dL at 7/31/2025  2:25 PM, will monitor as appropriate  # Coagulation Defect: INR = 1.17 (Ref range: 0.85 - 1.15) and/or PTT = N/A, will monitor for bleeding  # Thrombocytopenia: Lowest platelets = 81 in last 2 days, will monitor for bleeding   # Hypertension: Home medication list includes antihypertensive(s)           # Overweight: Estimated body mass index is 29.25 kg/m  as calculated from the following:    Height as of this encounter: 1.676 m (5' 6\").    Weight as of this encounter: 82.2 kg (181 lb 3.5 oz).       # Financial/Environmental Concerns: none         Social Drivers of Health    Tobacco Use: Medium Risk (7/31/2025)    Patient History     Smoking Tobacco Use: Former     Smokeless Tobacco Use: Unknown          Disposition Plan     Medically Ready for Discharge: Anticipated in 2-4 Days             Tanna Jasso MD  Hospitalist Service  [facility]  Securely message with [facility] (more info)  Text page via [employer] Paging/Directory   ______________________________________________________________________    Interval History   She was confused overnight   This am denied cp at all  Notes at home had sob  Does not know where she is , but did know date of 8/25  [facility]    No headache  Back pain for years lower back and thinks getting worse       Physical Exam   Vital Signs: Temp: 98.5  F (36.9  C) Temp src: Oral BP: (!) 146/69 Pulse: 112   Resp: 26 SpO2: 93 % O2 Device: Nasal cannula Oxygen Delivery: 2 LPM  Weight: 181 lbs 3.49 oz    Constitutional: awake, fatigued, alert, cooperative, and no apparent distress  Eyes: sclera clear  Respiratory: no increased work of breathing, good air exchange, no retractions, and clear to auscultation  Cardiovascular: irregularly irregular rhythm  GI: normal bowel sounds, soft, non-distended, and non-tender  Skin: no bruising or " bleeding  Musculoskeletal: no lower extremity pitting edema present  Neurologic: Mental Status Exam:  Level of Alertness:   awake  Orientation:   person, time--not to place  Language:  normal  Motor Exam:  moves all extremities well and symmetrically  Neuropsychiatric: General: normal, calm, and normal eye contact    Medical Decision Making       55 MINUTES SPENT BY ME on the date of service doing chart review, history, exam, documentation & further activities per the note.      Data     I have personally reviewed the following data over the past 24 hrs:    6.6  \   11.9   / 81 (L)     135 100 22.5 /  103 (H)   3.4 24 1.00 (H) \     ALT: 12 AST: 19 AP: 69 TBILI: 0.4   ALB: 3.7 TOT PROTEIN: 6.1 (L) LIPASE: N/A     Trop: 45 (H) BNP: 5,038 (H)     TSH: 2.16 T4: N/A A1C: N/A     Procal: 0.34 CRP: 102.30 (H) Lactic Acid: N/A       INR:  1.17 (H) PTT:  N/A   D-dimer:  N/A Fibrinogen:  N/A       Imaging results reviewed over the past 24 hrs:   Recent Results (from the past 24 hours)   CTA Chest Redo Sternotomy Planning    Narrative    For Patients: As a result of the 21st Century Cures Act, medical imaging exams and procedure reports are released immediately into your electronic medical record. You may view this report before your referring provider. If you have questions, please contact your health care provider.    EXAM: CTA CHEST  LOCATION: [facility] [place]  DATE: 7/31/2025    INDICATION: positive dimer, shortness of breath  COMPARISON: 11/1/2024  TECHNIQUE: CT chest pulmonary angiogram during arterial phase injection of IV contrast. Multiplanar reformats and MIP reconstructions were performed. Dose reduction techniques were used.   CONTRAST: IOPAMIDOL 300 MG/ML  ML BOTTLE: 50mL    FINDINGS:  ANGIOGRAM CHEST: Pulmonary arteries are normal caliber and negative for pulmonary emboli. Thoracic aorta is negative for dissection. No CT evidence of right heart strain.    LUNGS AND PLEURA: Trace bilateral  effusions with bibasilar atelectasis. Small linear areas of atelectasis and scarring are noted bilaterally. Biapical pleural-parenchymal scarring.    MEDIASTINUM/AXILLAE: Heart is moderately enlarged. Subcentimeter mediastinal lymph nodes are noted. Mildly prominent right hilar lymph node measuring 1.1 cm short axis.    CORONARY ARTERY CALCIFICATION: Moderate.    UPPER ABDOMEN: Cirrhotic liver. Partially visualized gallbladder is distended.    MUSCULOSKELETAL: Degenerative changes of the spine.    Impression    1.  No pulmonary embolism.  2.  Trace bilateral effusions with bibasilar atelectasis.  3.  Cirrhotic liver.  4.  Stable mildly prominent right hilar lymph nodes which is nonspecific.   CT Thoracic Spine w/o Contrast    Narrative    EXAM: CT THORACIC SPINE W/O CONTRAST, CT LUMBAR SPINE RECONSTRUCTED  LOCATION: [facility]  DATE: 7/31/2025    INDICATION: fall, back pain  COMPARISON: Lumbar spine CT dated 10/23/2024. Chest CT dated 08/08/2024.  TECHNIQUE:  1) Routine CT Thoracic Spine without IV contrast. Multiplanar reformats. Dose reduction techniques were used.   2) Routine CT Lumbar Spine without IV contrast. Multiplanar reformats. Dose reduction techniques were used.     FINDINGS:    THORACIC SPINE CT:  VERTEBRA: Diffuse osteopenia. Unchanged moderate anterior superior endplate height loss of the T7 vertebral body. The remaining vertebral body heights are maintained. Minimal retrolisthesis at T7-T8 is unchanged. Disc height loss, degenerative endplate   changes and anterior osteophytes throughout the thoracic spine. No acute fracture or posttraumatic subluxation.     CANAL/FORAMINA: Small disc osteophyte complexes at multiple levels. No high-grade spinal canal or neural foraminal stenosis.    PARASPINAL: Trace bilateral pleural effusions. Biapical scarring. Bilateral dependent atelectasis. Coronary calcifications. Atherosclerotic calcifications of the aortic arch and great  vessels.    LUMBAR SPINE CT:  VERTEBRA: Vertebral body heights are maintained. Mild stepwise retrolisthesis from L1-L4 is unchanged and likely degenerative in nature. No acute fracture or posttraumatic subluxation.     CANAL/FORAMINA: Disc osteophyte complexes contribute to mild spinal canal narrowing at L2-L3, L3-L4 and L4-L5. Multilevel facet arthropathy, most pronounced at L4-L5 and L5-S1. Mild neural foraminal stenosis at multiple levels.    PARASPINAL: No acute extraspinal abnormality. Degenerative changes of the apposing surfaces of the spinous processes at L3-L4 and L4-L5 is suggestive of Baastrup's disease. Degenerative changes of the sacroiliac joints. Atrophy of the paravertebral   muscles. Atherosclerotic calcifications of the abdominal aorta and branching vessels.      Impression    IMPRESSION:  THORACIC SPINE CT:  1.  No acute fracture or posttraumatic subluxation.  2.  Unchanged chronic compression fracture deformity of the T7 vertebral body.  3.  No high-grade spinal canal or neural foraminal stenosis.  4.  Multilevel spondylosis.    LUMBAR SPINE CT:  1.  No acute fracture or posttraumatic subluxation.  2.  No high-grade spinal canal or neural foraminal stenosis.  3.  Multilevel spondylosis.  4.  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     CT Abdomen Pelvis w/o Contrast    Narrative    EXAM: CT ABDOMEN PELVIS W/O CONTRAST  LOCATION: [facility]  DATE: 7/31/2025    INDICATION: fall, pain, new cirrhosis  COMPARISON: CT angiogram of the chest, earlier today; MRI of the abdomen 3/3/2022  TECHNIQUE: CT scan of the abdomen and pelvis was performed without IV contrast. Multiplanar reformats were obtained. Dose reduction techniques were used.  CONTRAST: None.    FINDINGS:   LOWER CHEST: Moderate coronary calcifications and calcification of the aortic root. Trace pleural fluid.    HEPATOBILIARY: Liver is small with diffuse nodular contour consistent with cirrhosis.  There is a circumscribed 2.6 cm cyst along the inferior right liver capsule (series 3, image 37). The gallbladder is distended. No gallbladder wall thickening or   pericholecystic inflammation. No bile duct enlargement.    PANCREAS: Normal.    SPLEEN: Normal.    ADRENAL GLANDS: Normal.    KIDNEYS/BLADDER: Bilateral parapelvic renal cysts are present and do not require imaging workup or follow-up. Excreted contrast is present in nondilated collecting systems and the upper ureters. No nephrolithiasis or hydronephrosis. There is a urine   contrast level in the urinary bladder. No bladder wall thickening or filling defects.    BOWEL: There are a few sigmoid diverticula. No dilated bowel or bowel wall thickening. There is a 13 x 23 mm fat attenuation lesion within the wall of the central small bowel (series 4, image 126) consistent with a benign lipoma. No peritoneal thickening   or ascites.    LYMPH NODES: There is hazy attenuation within the central mesentery suggesting mesenteric congestion. No enlarged lymph nodes in the abdomen or pelvis.    VASCULATURE: Severe atheromatous calcification of the abdominal aorta with lesser involvement of the iliac and common femoral arteries. No aneurysm.    PELVIC ORGANS: Normal.    MUSCULOSKELETAL: Generalized bone demineralization. Small to moderate degenerative osteophytes and related disc space narrowing. Mild lumbar facet arthropathy. No aggressive or destructive bone lesions.      Impression    IMPRESSION:     1.  Cirrhosis. Stable benign liver cyst inferior right lobe.  2.  Hazy attenuation within the central mesentery consistent with mesenteric congestion.  3.  Few distal colonic diverticula but no acute bowel inflammation or mechanical obstruction.     CT Lumbar Spine Reconstructed    Narrative    EXAM: CT THORACIC SPINE W/O CONTRAST, CT LUMBAR SPINE RECONSTRUCTED  LOCATION: [facility]  DATE: 7/31/2025    INDICATION: fall, back pain  COMPARISON:  Lumbar spine CT dated 10/23/2024. Chest CT dated 08/08/2024.  TECHNIQUE:  1) Routine CT Thoracic Spine without IV contrast. Multiplanar reformats. Dose reduction techniques were used.   2) Routine CT Lumbar Spine without IV contrast. Multiplanar reformats. Dose reduction techniques were used.     FINDINGS:    THORACIC SPINE CT:  VERTEBRA: Diffuse osteopenia. Unchanged moderate anterior superior endplate height loss of the T7 vertebral body. The remaining vertebral body heights are maintained. Minimal retrolisthesis at T7-T8 is unchanged. Disc height loss, degenerative endplate   changes and anterior osteophytes throughout the thoracic spine. No acute fracture or posttraumatic subluxation.     CANAL/FORAMINA: Small disc osteophyte complexes at multiple levels. No high-grade spinal canal or neural foraminal stenosis.    PARASPINAL: Trace bilateral pleural effusions. Biapical scarring. Bilateral dependent atelectasis. Coronary calcifications. Atherosclerotic calcifications of the aortic arch and great vessels.    LUMBAR SPINE CT:  VERTEBRA: Vertebral body heights are maintained. Mild stepwise retrolisthesis from L1-L4 is unchanged and likely degenerative in nature. No acute fracture or posttraumatic subluxation.     CANAL/FORAMINA: Disc osteophyte complexes contribute to mild spinal canal narrowing at L2-L3, L3-L4 and L4-L5. Multilevel facet arthropathy, most pronounced at L4-L5 and L5-S1. Mild neural foraminal stenosis at multiple levels.    PARASPINAL: No acute extraspinal abnormality. Degenerative changes of the apposing surfaces of the spinous processes at L3-L4 and L4-L5 is suggestive of Baastrup's disease. Degenerative changes of the sacroiliac joints. Atrophy of the paravertebral   muscles. Atherosclerotic calcifications of the abdominal aorta and branching vessels.      Impression    IMPRESSION:  THORACIC SPINE CT:  1.  No acute fracture or posttraumatic subluxation.  2.  Unchanged chronic compression fracture  deformity of the T7 vertebral body.  3.  No high-grade spinal canal or neural foraminal stenosis.  4.  Multilevel spondylosis.    LUMBAR SPINE CT:  1.  No acute fracture or posttraumatic subluxation.  2.  No high-grade spinal canal or neural foraminal stenosis.  3.  Multilevel spondylosis.  4.  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     CT Head w/o Contrast    Narrative    EXAM: CT HEAD W/O CONTRAST  LOCATION: [facility]  DATE: 7/31/2025    INDICATION: Fall at home with head injury  COMPARISON: 11/1/2024.  TECHNIQUE: Routine CT Head without IV contrast. Multiplanar reformats. Dose reduction techniques were used.    FINDINGS:  INTRACRANIAL CONTENTS: No intracranial hemorrhage, extraaxial collection, or mass effect.  No CT evidence of acute infarct. There is diffuse small vessel disease ischemia disease. There is stable focal encephalomalacia of the anterior left basal ganglia   and caudate head. The ventricular system, basal cisterns and the cortical sulci are consistent with minimal volume loss for age.     VISUALIZED ORBITS/SINUSES/MASTOIDS: No intraorbital abnormality. No paranasal sinus mucosal disease. No middle ear or mastoid effusion.    BONES/SOFT TISSUES: No acute abnormality.      Impression    IMPRESSION:  1.  No CT finding of a mass, hemorrhage or focal area suggestive of acute infarct.  2.  Stable diffuse small vessel ischemic disease and focal encephalomalacia anterior left basal ganglia and caudate head.  3.  Stable minimal volume loss for age.

## 2025-08-01 NOTE — PLAN OF CARE
Goal Outcome Evaluation:      Plan of Care Reviewed With: patient          Outcome Evaluation: Return to home at discharge.

## 2025-08-02 ENCOUNTER — APPOINTMENT (OUTPATIENT)
Dept: PHYSICAL THERAPY | Facility: HOSPITAL | Age: OVER 89
End: 2025-08-02
Payer: COMMERCIAL

## 2025-08-02 PROBLEM — A02.0 SALMONELLA ENTERITIS: Status: ACTIVE | Noted: 2025-08-02

## 2025-08-02 LAB
ANION GAP SERPL CALCULATED.3IONS-SCNC: 7 MMOL/L (ref 7–15)
BACTERIA UR CULT: ABNORMAL
BUN SERPL-MCNC: 17.3 MG/DL (ref 8–23)
C CAYETANENSIS DNA STL QL NAA+NON-PROBE: NEGATIVE
C DIFF TOX B STL QL: NEGATIVE
CALCIUM SERPL-MCNC: 8.6 MG/DL (ref 8.8–10.4)
CAMPYLOBACTER DNA SPEC NAA+PROBE: NEGATIVE
CHLORIDE SERPL-SCNC: 100 MMOL/L (ref 98–107)
CREAT SERPL-MCNC: 0.84 MG/DL (ref 0.51–0.95)
CRYPTOSP DNA STL QL NAA+NON-PROBE: NEGATIVE
EC STX1+STX2 GENES STL QL NAA+NON-PROBE: NEGATIVE
EGFRCR SERPLBLD CKD-EPI 2021: 64 ML/MIN/1.73M2
ERYTHROCYTE [DISTWIDTH] IN BLOOD BY AUTOMATED COUNT: 13.2 % (ref 10–15)
G LAMBLIA DNA STL QL NAA+NON-PROBE: NEGATIVE
GLUCOSE SERPL-MCNC: 115 MG/DL (ref 70–99)
HCO3 SERPL-SCNC: 27 MMOL/L (ref 22–29)
HCT VFR BLD AUTO: 34.6 % (ref 35–47)
HGB BLD-MCNC: 11.6 G/DL (ref 11.7–15.7)
HOLD SPECIMEN: NORMAL
MAGNESIUM SERPL-MCNC: 2.1 MG/DL (ref 1.7–2.3)
MCH RBC QN AUTO: 31.1 PG (ref 26.5–33)
MCHC RBC AUTO-ENTMCNC: 33.5 G/DL (ref 31.5–36.5)
MCV RBC AUTO: 93 FL (ref 78–100)
NOROVIRUS GI+II RNA STL QL NAA+NON-PROBE: NEGATIVE
PHOSPHATE SERPL-MCNC: 2.2 MG/DL (ref 2.5–4.5)
PLATELET # BLD AUTO: 89 10E3/UL (ref 150–450)
POTASSIUM SERPL-SCNC: 3.8 MMOL/L (ref 3.4–5.3)
RBC # BLD AUTO: 3.73 10E6/UL (ref 3.8–5.2)
SALMONELLA SP RPOD STL QL NAA+PROBE: POSITIVE
SHIGELLA SP+EIEC IPAH ST NAA+NON-PROBE: NEGATIVE
SODIUM SERPL-SCNC: 134 MMOL/L (ref 135–145)
VIBRIO DNA SPEC NAA+PROBE: NEGATIVE
WBC # BLD AUTO: 6.6 10E3/UL (ref 4–11)
Y ENTEROCOL DNA STL QL NAA+PROBE: NEGATIVE

## 2025-08-02 PROCEDURE — 250N000013 HC RX MED GY IP 250 OP 250 PS 637

## 2025-08-02 PROCEDURE — 250N000013 HC RX MED GY IP 250 OP 250 PS 637: Performed by: PHYSICIAN ASSISTANT

## 2025-08-02 PROCEDURE — 87493 C DIFF AMPLIFIED PROBE: CPT | Performed by: INTERNAL MEDICINE

## 2025-08-02 PROCEDURE — 80048 BASIC METABOLIC PNL TOTAL CA: CPT | Performed by: INTERNAL MEDICINE

## 2025-08-02 PROCEDURE — 99222 1ST HOSP IP/OBS MODERATE 55: CPT | Performed by: INTERNAL MEDICINE

## 2025-08-02 PROCEDURE — 83735 ASSAY OF MAGNESIUM: CPT | Performed by: STUDENT IN AN ORGANIZED HEALTH CARE EDUCATION/TRAINING PROGRAM

## 2025-08-02 PROCEDURE — 120N000004 HC R&B MS OVERFLOW

## 2025-08-02 PROCEDURE — 99232 SBSQ HOSP IP/OBS MODERATE 35: CPT | Performed by: INTERNAL MEDICINE

## 2025-08-02 PROCEDURE — 84100 ASSAY OF PHOSPHORUS: CPT | Performed by: STUDENT IN AN ORGANIZED HEALTH CARE EDUCATION/TRAINING PROGRAM

## 2025-08-02 PROCEDURE — 99233 SBSQ HOSP IP/OBS HIGH 50: CPT | Performed by: INTERNAL MEDICINE

## 2025-08-02 PROCEDURE — 97530 THERAPEUTIC ACTIVITIES: CPT | Mod: GP

## 2025-08-02 PROCEDURE — 85018 HEMOGLOBIN: CPT | Performed by: INTERNAL MEDICINE

## 2025-08-02 PROCEDURE — 36415 COLL VENOUS BLD VENIPUNCTURE: CPT | Performed by: INTERNAL MEDICINE

## 2025-08-02 PROCEDURE — 87506 IADNA-DNA/RNA PROBE TQ 6-11: CPT | Performed by: INTERNAL MEDICINE

## 2025-08-02 PROCEDURE — 250N000011 HC RX IP 250 OP 636: Performed by: INTERNAL MEDICINE

## 2025-08-02 PROCEDURE — 250N000013 HC RX MED GY IP 250 OP 250 PS 637: Performed by: INTERNAL MEDICINE

## 2025-08-02 RX ORDER — LOSARTAN POTASSIUM 50 MG/1
50 TABLET ORAL EVERY MORNING
Status: DISCONTINUED | OUTPATIENT
Start: 2025-08-02 | End: 2025-08-07 | Stop reason: HOSPADM

## 2025-08-02 RX ORDER — POTASSIUM CHLORIDE 1500 MG/1
20 TABLET, EXTENDED RELEASE ORAL ONCE
Status: COMPLETED | OUTPATIENT
Start: 2025-08-02 | End: 2025-08-02

## 2025-08-02 RX ADMIN — POTASSIUM & SODIUM PHOSPHATES POWDER PACK 280-160-250 MG 1 PACKET: 280-160-250 PACK at 17:33

## 2025-08-02 RX ADMIN — POTASSIUM & SODIUM PHOSPHATES POWDER PACK 280-160-250 MG 1 PACKET: 280-160-250 PACK at 13:24

## 2025-08-02 RX ADMIN — ACETAMINOPHEN 975 MG: 325 TABLET ORAL at 09:01

## 2025-08-02 RX ADMIN — MONTELUKAST SODIUM 1 G: 4 TABLET, CHEWABLE ORAL at 09:02

## 2025-08-02 RX ADMIN — METOPROLOL SUCCINATE 100 MG: 100 TABLET, EXTENDED RELEASE ORAL at 09:02

## 2025-08-02 RX ADMIN — ACETAMINOPHEN 975 MG: 325 TABLET ORAL at 20:54

## 2025-08-02 RX ADMIN — CEFTRIAXONE SODIUM 1 G: 1 INJECTION, POWDER, FOR SOLUTION INTRAMUSCULAR; INTRAVENOUS at 09:14

## 2025-08-02 RX ADMIN — FOLIC ACID 1 MG: 1 TABLET ORAL at 09:02

## 2025-08-02 RX ADMIN — TRAZODONE HYDROCHLORIDE 100 MG: 50 TABLET ORAL at 20:54

## 2025-08-02 RX ADMIN — POTASSIUM CHLORIDE 20 MEQ: 1500 TABLET, EXTENDED RELEASE ORAL at 09:07

## 2025-08-02 RX ADMIN — POTASSIUM & SODIUM PHOSPHATES POWDER PACK 280-160-250 MG 1 PACKET: 280-160-250 PACK at 09:04

## 2025-08-02 RX ADMIN — ACETAMINOPHEN 975 MG: 325 TABLET ORAL at 16:04

## 2025-08-02 RX ADMIN — MONTELUKAST SODIUM 1 G: 4 TABLET, CHEWABLE ORAL at 20:54

## 2025-08-02 RX ADMIN — THIAMINE HYDROCHLORIDE 100 MG: 100 INJECTION, SOLUTION INTRAMUSCULAR; INTRAVENOUS at 09:08

## 2025-08-02 RX ADMIN — PHENYTOIN SODIUM 100 MG: 100 CAPSULE, EXTENDED RELEASE ORAL at 20:54

## 2025-08-02 RX ADMIN — PRAVASTATIN SODIUM 40 MG: 20 TABLET ORAL at 20:54

## 2025-08-02 RX ADMIN — LOSARTAN POTASSIUM 50 MG: 50 TABLET, FILM COATED ORAL at 10:26

## 2025-08-02 ASSESSMENT — ACTIVITIES OF DAILY LIVING (ADL)
ADLS_ACUITY_SCORE: 61
ADLS_ACUITY_SCORE: 53
ADLS_ACUITY_SCORE: 55
ADLS_ACUITY_SCORE: 53
ADLS_ACUITY_SCORE: 61
ADLS_ACUITY_SCORE: 55
ADLS_ACUITY_SCORE: 53
ADLS_ACUITY_SCORE: 61
ADLS_ACUITY_SCORE: 53
ADLS_ACUITY_SCORE: 61
ADLS_ACUITY_SCORE: 53
ADLS_ACUITY_SCORE: 53
ADLS_ACUITY_SCORE: 65
ADLS_ACUITY_SCORE: 53
ADLS_ACUITY_SCORE: 53
ADLS_ACUITY_SCORE: 61
ADLS_ACUITY_SCORE: 53
ADLS_ACUITY_SCORE: 61
ADLS_ACUITY_SCORE: 55

## 2025-08-02 NOTE — PROGRESS NOTES
[facility]    Medicine Progress Note - Hospitalist Service    Date of Admission:  7/31/2025    Assessment & Plan   Chavez Tavares is a 89 year old female with PMHx of HTN, seizure disorder, DLD, PACs, LBBB who was admitted on 7/31/2025. She initially presented to ER for evaluation of back pain and SOB, sent to ER for further evaluation and management of new A fib with RVR , and Salmonella infection     1.Afib with RVR -new  -Elevated Troponin , no cp  -- EKG atrial fib with LBBB (known since 2024)  -- Trop 49 ; 48, 45  -- increasing today metoprolol to 100 mg  -- HOLD PTA amlodipine given softer pressures at admit   -- Cardiac monitoring   -- Cardiology consult   -- Electrolyte replacement protocols   -- CHADS score of at least 4 - I am concerned with thrombocytopenia and falls--cards to eval--and they did discuss anticoagulation with family and they declined   -will also do referral for sleep eval(family notes snoring loud and never tested for SATISH)     2.Elevated BNP  -stop ivf  -echo  Left ventricular size, wall motion and function are normal. The ejection  fraction is 60-65%.  Normal right ventricle size and systolic function.  The left atrium is severely dilated. The right atrium is mildly dilated.  No hemodynamically significant valvular abnormalities on 2D or color flow  imaging.  Compared to the prior study, atrial fibrillation is now present.       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--will ask spine to see--they did, PT and follow up in clinic   -- Pain control with scheduled Tylenol, lido patch, low dose PRN oxycodone   -- PT/OT/CM  -- Fall precautions     6.Shortness of Breath   Fatigue   Deconditioning   Endorses SOB  Likely multifactorial in the setting of Afib with RVR,  -- Management of Afib as above  -- CT chest negative for PE   -- Checked respiratory panel -neg  -- Monitor for hypoxia  -- PT/OT/CM      7.Elevated Inflammatory Markers   .3   -- Normal WBC and procal   -- Afebrile   -- Follow-up Bcx   -- Follow-up respiratory panel   --treat uti  -also could be up from gi Salmonella issue   --I suspect this could be up from severe spine changes  as well      8.Hypomagnesemia - replacement protocols   Hypokalemia - replacement protocols      9.Essential HTN   -- HOLD PTA losartan and hydrochlorothiazide given LUCI and hypokalemia   -- Continue  metoprolol higher dose    10.Abnl UA  -concern uti  -ceftriaxone and check UC    11.Acute encephalopathy In pt with cognitive decline   -could be toxic uti, also at risk age, hospital delirium  -1:1  -treat uti  -check ammonia --ok  -head ct neg acute  -will also ask neuro to see with zoran gaspar , check eeg    12.Cirrhosis on scan  -not clear if this was known,   -check LFT's and inr, ammonia  -likely causing low plt  -I did speak to family and they were aware and pt still drinks wine  -started thiamine and folic acid  Watch for withdrawal symptoms  -told family she needs to stop    13.Thrombocytopenia  -likely from above  -trend, now up to 89    14.Loose stools--Salmonella   -now per family pre-dates admit  -pt not sure  -had 5-6 loose stools last few days  -now stool cx pos Salmonella  -on precautions  -already on ceftriaxone, will ask ID to see for duration of treatment--I am hesitant about fluoroquinolones with enceph , but she is at risk with age, cardiac and liver dz    15.HLD - continue PTA statin and colestipol  "    16.History of Seizure Disorder - continue PTA phenytoin   -checked eeg-no sz on eeg here  -neuro did see      -I called  at 0900 to update and I recommend consider TCU and she agreed and care management updated           Diet: Combination Diet Low Saturated Fat Sodium <2400mg Diet    DVT Prophylaxis: Pneumatic Compression Devices  Harris Catheter: Not present  Lines: None     Cardiac Monitoring: ACTIVE order. Indication: Tachyarrhythmias, acute (48 hours)  Code Status: No CPR- Do NOT Intubate      Clinically Significant Risk Factors        # Hypokalemia: Lowest K = 2.8 mmol/L in last 2 days, will replace as needed  # Hyponatremia: Lowest Na = 131 mmol/L in last 2 days, will monitor as appropriate  # Hypochloremia: Lowest Cl = 94 mmol/L in last 2 days, will monitor as appropriate  # Hypocalcemia: Lowest Ca = 7.9 mg/dL in last 2 days, will monitor and replace as appropriate     # Hypoalbuminemia: Lowest albumin = 3.2 g/dL at 7/31/2025  2:25 PM, will monitor as appropriate  # Coagulation Defect: INR = 1.17 (Ref range: 0.85 - 1.15) and/or PTT = N/A, will monitor for bleeding  # Thrombocytopenia: Lowest platelets = 81 in last 2 days, will monitor for bleeding              # Overweight: Estimated body mass index is 28.83 kg/m  as calculated from the following:    Height as of this encounter: 1.676 m (5' 6\").    Weight as of this encounter: 81 kg (178 lb 9.6 oz)., PRESENT ON ADMISSION     # Financial/Environmental Concerns: none         Social Drivers of Health    Tobacco Use: Medium Risk (7/31/2025)    Patient History     Smoking Tobacco Use: Former     Smokeless Tobacco Use: Unknown          Disposition Plan     Medically Ready for Discharge: Anticipated in 2-4 Days             Tanna Jasso MD  Hospitalist Service  [facility]  Securely message with [employer] (more info)  Text page via [employer] Paging/Directory "   ______________________________________________________________________    Interval History   She had 6 loose non bloody stools per RN  Pt not sure how long loose stools  Daughter told me for sure pre-dated admit and off and on for months loose stools  No abd pain  No cp  No sob at rest  Hr still issue with tachycardia and afib       Physical Exam   Vital Signs: Temp: 97.7  F (36.5  C) Temp src: Oral BP: (!) 150/97 Pulse: 109   Resp: 20 SpO2: 93 % O2 Device: Nasal cannula Oxygen Delivery: 2 LPM  Weight: 178 lbs 9.6 oz    Constitutional: awake, fatigued, alert, cooperative, and no apparent distress  Eyes: sclera clear  Respiratory: no increased work of breathing, good air exchange, no retractions, and clear to auscultation  Cardiovascular:tachy  irregularly irregular rhythm and normal S1 and S2  GI: normal bowel sounds, soft, non-distended, and non-tender  Skin: no bruising or bleeding  Musculoskeletal: no lower extremity pitting edema present  Neurologic: Mental Status Exam:  Level of Alertness:   awake  Neuropsychiatric: General: normal, calm, and normal eye contact    Medical Decision Making       55 MINUTES SPENT BY ME on the date of service doing chart review, history, exam, documentation & further activities per the note.      Data     I have personally reviewed the following data over the past 24 hrs:    6.6  \   11.6 (L)   / 89 (L)     134 (L) 100 17.3 /  115 (H)   3.8 27 0.84 \       Imaging results reviewed over the past 24 hrs:   Recent Results (from the past 24 hours)   Echocardiogram Complete   Result Value    LVEF  60-65%    Narrative    [id number]  [id number]  [id number]  [id number]^     [place] [address]     Name: DEYSI MENEZES  MRN: [id number]  : 1931  Study Date: 2025 01:00 PM  Age: 89 yrs  Gender: Female  Patient Location: [facility]  Reason For Study: Atrial Fibrillation  Ordering Physician: EV HARMAN  Performed By: CMP^^^^     BSA: 1.9  m2  Height: 66 in  Weight: 181 lb  HR: 99  BP: 139/82 mmHg  ______________________________________________________________________________  Procedure  Echocardiogram with two-dimensional, color and spectral Doppler. Definity (NDC  #[id number]) given intravenously. Adequate quality two-dimensional was  performed and interpreted. No hemodynamically significant valvular  abnormalities on 2D or color flow imaging.  ______________________________________________________________________________  Interpretation Summary     Left ventricular size, wall motion and function are normal. The ejection  fraction is 60-65%.  Normal right ventricle size and systolic function.  The left atrium is severely dilated. The right atrium is mildly dilated.  No hemodynamically significant valvular abnormalities on 2D or color flow  imaging.  Compared to the prior study, atrial fibrillation is now present.  ______________________________________________________________________________  Left Ventricle  Left ventricular size, wall motion and function are normal. The ejection  fraction is 60-65%. Diastolic function not assessed due to atrial  fibrillation.     Right Ventricle  Normal right ventricle size and systolic function.     Atria  The left atrium is severely dilated. The right atrium is mildly dilated.     Mitral Valve  Mitral valve leaflets appear normal. There is no evidence of mitral stenosis  or clinically significant mitral regurgitation.     Tricuspid Valve  Tricuspid valve leaflets appear normal. There is no evidence of tricuspid  stenosis or clinically significant tricuspid regurgitation. Right ventricle  systolic pressure estimate normal. The right ventricular systolic pressure is  approximated at 31.6 mmHg plus the right atrial pressure.     Aortic Valve  The aortic valve is trileaflet. Aortic valve leaflets appear normal. There is  no evidence of aortic stenosis or clinically significant aortic regurgitation.     Pulmonic  Valve  The pulmonic valve is not well seen, but is grossly normal. This degree of  valvular regurgitation is within normal limits. There is trace pulmonic  valvular regurgitation.     Vessels  The aorta root is normal. Normal size ascending aorta. IVC diameter <2.1 cm  collapsing >50% with sniff suggests a normal RA pressure of 3 mmHg.     Pericardium  There is no pericardial effusion.     Rhythm  The rhythm was atrial fibrillation.  ______________________________________________________________________________  MMode/2D Measurements & Calculations     Ao root diam: 3.0 cm  LVOT diam: 1.8 cm  LVOT area: 2.5 cm2  Ao root diam index Ht(cm/m): 1.8  Ao root diam index BSA (cm/m2): 1.6  LA Volume (BP): 114.0 ml  LA Volume Index (BP): 59.4 ml/m2     LA Volume Indexed (AL/bp): 64.3 ml/m2  RV Base: 2.6 cm  TAPSE: 1.3 cm     Time Measurements  MM HR: 99.0 BPM     Doppler Measurements & Calculations  MV E max jorge: 130.6 cm/sec  MV max P.5 mmHg  MV mean P.0 mmHg  MV V2 VTI: 24.4 cm  MVA(VTI): 2.4 cm2  MV dec time: 0.17 sec  Ao V2 max: 154.0 cm/sec  Ao max P.0 mmHg  Ao V2 mean: 105.0 cm/sec  Ao mean P.0 mmHg  Ao V2 VTI: 26.2 cm  GENEVA(I,D): 2.3 cm2  GENEVA(V,D): 2.4 cm2  LV V1 max P.4 mmHg  LV V1 max: 145.0 cm/sec  LV V1 VTI: 23.3 cm     SV(LVOT): 59.3 ml  SI(LVOT): 30.9 ml/m2  PA V2 max: 88.3 cm/sec  PA max PG: 3.1 mmHg  PA acc time: 0.04 sec  TR max jorge: 281.0 cm/sec  TR max P.6 mmHg  AV Jorge Ratio (DI): 0.94  GENEVA Index (cm2/m2): 1.2  E/E': 22.9  E/E' av.7  Lateral E/e': 20.3  Medial E/e': 23.1  Peak E' Jorge: 5.7 cm/sec  RV S Jorge: 10.7 cm/sec     ______________________________________________________________________________  Report approved by: Mario Grady MD on 2025 03:20 PM

## 2025-08-02 NOTE — CONSULTS
[facility]    Infectious Disease Consultation     Date of Admission:  7/31/2025  Date of Consult (When I saw the patient): 08/02/25    Assessment & Plan   Chavez Tavares is a 89 year old female who was admitted on 7/31/2025.     Impression:  A-fib with RVR  Advanced age, 89 years old  Cirrhosis  Previous history of hypertension seizure disorder  Presented with shortness of breath, back pain, encephalopathy  Previous history of ongoing diarrhea, multiple family members at bedside helping with history  Stool PCR with Salmonella species.  Salmonella enteritis? CT chest abdomen pelvis.  Evidence of colitis.  Diverticulosis present  Possible UTI versus asymptomatic bacteriuria.  Denies any urinary symptoms  Cardiology, neurology following    Susceptibility data from last 90 days.  Collected Specimen Info Organism   07/31/25 Urine, Catheter Gram positive bacilli     Gram positive bacilli, resembling diphtheroids     Gram positive cocci     Non lactose fermenting gram negative bacilli     Urogenital meagan (5)     Urogenital meagan (6)       Recommendations    Salmonella- Supportive care, follow blood cultures  Continue ceftriaxone x 7 days, if blood cultures remain negative, and no evidence of invasive disease identified.  Salmonella may be associated with secretions, invasive disease  Follow urine cultures  Outpatient GI follow-up for ongoing diarrhea, intermittent for several months  CBC, CMP  T7 compression fracture, seen by neurosurgery- no intervention planned at this time  GI follow-up inpatient versus outpatient if diarrhea persists  Detailed discussion with patient and patient's family members at bedside  Thank you for consulting infectious disease.  Final antibiotic plan to be determined based on clinical response and culture result      Kourtney Chadwick MD  [facility]  Answering Service: [phone]  On-Call ID provider: [phone], option: 9      Reason for  Consult   Reason for consult: I was asked to evaluate this patient for     89 y/o active drinker, cirrhrosis, admit new onset Afib, abnl ua was started on cefriaxone concern possible uti, came in with diarhhea stool pos Salmonella. Please give recs treatment   .    Primary Care Physician   Silvia Jama    Chief Complaint       History is obtained from the patient and medical records    History of Present Illness   Chavez Tavares is a 89 year old female who presents with history of hypertension, seizure disorder, atrial fibrillation with RVR, presented with shortness of breath, worsening low back pain over the past several weeks.  Fall several nights ago that exacerbated the symptoms  Patient has several weeks of shortness of breath and fatigue, acutely worse few days prior to admission.  Worse with exertion.   Intermittent diarrhea on and off for several months.  This has been present prior to admission  Decreased appetite decreased p.o. intake prior to this admission  Overall feeling slightly better  No dysuria    Past Medical History   I have reviewed this patient's medical history and updated it with pertinent information if needed.   Past Medical History:   Diagnosis Date    Chronic colitis     Epilepsy (H)     Hypertension     Mixed hyperlipidemia        Past Surgical History   I have reviewed this patient's surgical history and updated it with pertinent information if needed.  Past Surgical History:   Procedure Laterality Date    COLONOSCOPY N/A 7/7/2022    Procedure: COLONOSCOPY;  Surgeon: Vincent Donnelly MD;  [place] [facility] [place] SURGERY      ESOPHAGOSCOPY, GASTROSCOPY, DUODENOSCOPY (EGD), COMBINED N/A 7/7/2022    Procedure: ESOPHAGOGASTRODUODENOSCOPY WITH;  Surgeon: Vincent Donnelly MD;  [place] [facility] [place]    LUMPECTOMY BREAST Right 01/01/2005       Prior to Admission Medications   Prior to Admission Medications   Prescriptions Last Dose Informant Patient Reported?  Taking?   Calcium-Magnesium-Vitamin D (CALCIUM 1200+D3 PO) 7/31/2025 Morning  Yes Yes   Sig: Take 1 tablet by mouth every morning.   amLODIPine (NORVASC) 10 MG tablet 7/30/2025 Evening  Yes Yes   Sig: Take 10 mg by mouth every evening.   calcium carbonate (TUMS) 500 MG chewable tablet   Yes Yes   Sig: Take 1 chew tab by mouth as needed for heartburn.   colestipol (COLESTID) 1 g tablet 7/31/2025 Morning  Yes Yes   Sig: Take 1 g by mouth 2 times daily.   hydrochlorothiazide (HYDRODIURIL) 25 MG tablet 7/31/2025 Morning  Yes Yes   Sig: Take 25 mg by mouth every morning.   losartan (COZAAR) 100 MG tablet 7/31/2025 Morning  Yes Yes   Sig: Take 100 mg by mouth every morning.   metoprolol succinate ER (TOPROL XL) 25 MG 24 hr tablet 7/31/2025 Morning  Yes Yes   Sig: Take 2 tablets by mouth every morning.   phenytoin (DILANTIN) 100 MG ER capsule 7/30/2025 Evening  Yes Yes   Sig: Take 100 mg by mouth every evening.   pravastatin (PRAVACHOL) 40 MG tablet 7/30/2025 Evening  Yes Yes   Sig: [PRAVASTATIN (PRAVACHOL) 40 MG TABLET] Take 40 mg by mouth bedtime.   traZODone (DESYREL) 50 MG tablet 7/30/2025 Bedtime  Yes Yes   Sig: Take 2 tablets by mouth at bedtime.      Facility-Administered Medications: None     Allergies   Allergies   Allergen Reactions    Lisinopril Cough    Simvastatin Muscle Pain (Myalgia)    Carbamazepine Rash       Immunization History   Immunization History   Administered Date(s) Administered    COVID-19 12+ (MODERNA) 11/17/2023, 10/18/2024    COVID-19 Bivalent 12+ (Pfizer) 10/18/2022    COVID-19 MONOVALENT 12+ (Pfizer) 01/28/2021, 02/18/2021, 10/13/2021       Social History   I have reviewed this patient's social history and updated it with pertinent information if needed. Chavez Tavares  reports that she quit smoking about 24 years ago. She does not have any smokeless tobacco history on file.    Family History   I have reviewed this patient's family history and updated it with pertinent information if needed.    Family History   Problem Relation Age of Onset    Coronary Artery Disease Mother     Coronary Artery Disease Father        Review of Systems   The 10 point Review of Systems is negative other than noted in the HPI or here.     Physical Exam   Temp: 97.7  F (36.5  C) Temp src: Oral BP: (!) 150/97 Pulse: 95   Resp: 20 SpO2: 93 % O2 Device: Nasal cannula Oxygen Delivery: 2 LPM  Vital Signs with Ranges  Temp:  [97.4  F (36.3  C)-98.6  F (37  C)] 97.7  F (36.5  C)  Pulse:  [] 95  Resp:  [16-20] 20  BP: (136-150)/(65-97) 150/97  SpO2:  [89 %-95 %] 93 %  178 lbs 9.6 oz  Body mass index is 28.83 kg/m .    GENERAL APPEARANCE:  awake, fatigued  EYES: Eyes grossly normal to inspection, conjunctivae and sclerae normal  HENT: mouth without ulcers or lesions  NECK: supple  RESP: Decreased at bases  CV: S1 S2  LYMPHATICS: no swelling  ABDOMEN: Nontender  MS: extremities normal-very deconditioned  SKIN: Warm  NEURO: Awake, deconditioned, able to answer questions      LABORATORY DATA:  Reviewed    CBC RESULTS: reviewed    Recent Labs   Lab Test 08/02/25  0431   WBC 6.6   RBC 3.73*   HGB 11.6*   HCT 34.6*   MCV 93   MCH 31.1   MCHC 33.5   RDW 13.2   PLT 89*        Last Comprehensive Metabolic Panel:  Sodium   Date Value Ref Range Status   08/02/2025 134 (L) 135 - 145 mmol/L Final     Potassium   Date Value Ref Range Status   08/02/2025 3.8 3.4 - 5.3 mmol/L Final     Chloride   Date Value Ref Range Status   08/02/2025 100 98 - 107 mmol/L Final     Carbon Dioxide (CO2)   Date Value Ref Range Status   08/02/2025 27 22 - 29 mmol/L Final     Anion Gap   Date Value Ref Range Status   08/02/2025 7 7 - 15 mmol/L Final     Glucose   Date Value Ref Range Status   08/02/2025 115 (H) 70 - 99 mg/dL Final     Urea Nitrogen   Date Value Ref Range Status   08/02/2025 17.3 8.0 - 23.0 mg/dL Final     Creatinine   Date Value Ref Range Status   08/02/2025 0.84 0.51 - 0.95 mg/dL Final     GFR Estimate   Date Value Ref Range Status   08/02/2025 64  ">60 mL/min/1.73m2 Final     Comment:     eGFR calculated using  CKD-EPI equation.     Calcium   Date Value Ref Range Status   2025 8.6 (L) 8.8 - 10.4 mg/dL Final     Bilirubin Total   Date Value Ref Range Status   2025 0.4 <=1.2 mg/dL Final     Alkaline Phosphatase   Date Value Ref Range Status   2025 69 40 - 150 U/L Final     ALT   Date Value Ref Range Status   2025 12 0 - 50 U/L Final     AST   Date Value Ref Range Status   2025 19 0 - 45 U/L Final             No results found for: \"CRP\"         MICROBIOLOGY:    Reviewed    Blood cultures  Other Micro:  Susceptibility data from last 90 days.  Collected Specimen Info Organism   25 Urine, Catheter Gram positive bacilli     Gram positive bacilli, resembling diphtheroids     Gram positive cocci     Non lactose fermenting gram negative bacilli     Urogenital meagan (5)     Urogenital meagan (6)         RADIOLOGY:    Echocardiogram Complete  Result Date: 2025  [id number]^GHAZAL^EV^  [place] [address]  Name: DEYSI MENEZES MRN: [id number] : 1931 Study Date: 2025 01:00 PM Age: 89 yrs Gender: Female Patient Location: [facility] Reason For Study: Atrial Fibrillation Ordering Physician: EV HARMAN Performed By: CMP^^^^  BSA: 1.9 m2 Height: 66 in Weight: 181 lb HR: 99 BP: 139/82 mmHg ______________________________________________________________________________ Procedure Echocardiogram with two-dimensional, color and spectral Doppler. Definity (NDC #55905-797) given intravenously. Adequate quality two-dimensional was performed and interpreted. No hemodynamically significant valvular abnormalities on 2D or color flow imaging. ______________________________________________________________________________ Interpretation Summary  Left ventricular size, wall motion and function are normal. The ejection fraction is 60-65%. Normal right ventricle size and systolic " function. The left atrium is severely dilated. The right atrium is mildly dilated. No hemodynamically significant valvular abnormalities on 2D or color flow imaging. Compared to the prior study, atrial fibrillation is now present. ______________________________________________________________________________ Left Ventricle Left ventricular size, wall motion and function are normal. The ejection fraction is 60-65%. Diastolic function not assessed due to atrial fibrillation.  Right Ventricle Normal right ventricle size and systolic function.  Atria The left atrium is severely dilated. The right atrium is mildly dilated.  Mitral Valve Mitral valve leaflets appear normal. There is no evidence of mitral stenosis or clinically significant mitral regurgitation.  Tricuspid Valve Tricuspid valve leaflets appear normal. There is no evidence of tricuspid stenosis or clinically significant tricuspid regurgitation. Right ventricle systolic pressure estimate normal. The right ventricular systolic pressure is approximated at 31.6 mmHg plus the right atrial pressure.  Aortic Valve The aortic valve is trileaflet. Aortic valve leaflets appear normal. There is no evidence of aortic stenosis or clinically significant aortic regurgitation.  Pulmonic Valve The pulmonic valve is not well seen, but is grossly normal. This degree of valvular regurgitation is within normal limits. There is trace pulmonic valvular regurgitation.  Vessels The aorta root is normal. Normal size ascending aorta. IVC diameter <2.1 cm collapsing >50% with sniff suggests a normal RA pressure of 3 mmHg.  Pericardium There is no pericardial effusion.  Rhythm The rhythm was atrial fibrillation. ______________________________________________________________________________ MMode/2D Measurements & Calculations  Ao root diam: 3.0 cm LVOT diam: 1.8 cm LVOT area: 2.5 cm2 Ao root diam index Ht(cm/m): 1.8 Ao root diam index BSA (cm/m2): 1.6 LA Volume (BP): 114.0 ml LA Volume  Index (BP): 59.4 ml/m2  LA Volume Indexed (AL/bp): 64.3 ml/m2 RV Base: 2.6 cm TAPSE: 1.3 cm  Time Measurements MM HR: 99.0 BPM  Doppler Measurements & Calculations MV E max jorge: 130.6 cm/sec MV max P.5 mmHg MV mean P.0 mmHg MV V2 VTI: 24.4 cm MVA(VTI): 2.4 cm2 MV dec time: 0.17 sec Ao V2 max: 154.0 cm/sec Ao max P.0 mmHg Ao V2 mean: 105.0 cm/sec Ao mean P.0 mmHg Ao V2 VTI: 26.2 cm GENEVA(I,D): 2.3 cm2 GENEVA(V,D): 2.4 cm2 LV V1 max P.4 mmHg LV V1 max: 145.0 cm/sec LV V1 VTI: 23.3 cm  SV(LVOT): 59.3 ml SI(LVOT): 30.9 ml/m2 PA V2 max: 88.3 cm/sec PA max PG: 3.1 mmHg PA acc time: 0.04 sec TR max jorge: 281.0 cm/sec TR max P.6 mmHg AV Jorge Ratio (DI): 0.94 GENEVA Index (cm2/m2): 1.2 E/E': 22.9 E/E' av.7 Lateral E/e': 20.3 Medial E/e': 23.1 Peak E' Jorge: 5.7 cm/sec RV S Jorge: 10.7 cm/sec  ______________________________________________________________________________ Report approved by: Mario Grady MD on 2025 03:20 PM       EEG Awake & Sleep Portable  Result Date: 2025  [facility]  Name: Chavez Tavares;  : 1931;  MRN: [id number] PCP: Silvia Jama Date of study: Aug 1, 2025 History: Chavez Tavares is a 89 year old female admitted to [facility] with urinary tract infection. This EEG is performed to evaluate for seizure. Findings: This 22 channel digital EEG is recorded using the 10-20 International system.  The recording shows a posterior dominant background rhythm of 7.5 Hz which is well-formed, symmetric and attenuates normally to eye opening.  Photic stimulation produces no change in the record.  Later in the recording there is waxing and waning of the background rhythms indicating drowsiness but clear EEG correlates of sleep were not attained.  There are no areas of focal or lateralized slowing.  No epileptiform findings are present. Impression: This is a mildly abnormal EEG for the awake and drowsy states due to the presence of some slowing of the  background rhythm.  This finding is consistent with an underlying cognitive impairment. There is no evidence of seizures on today's study, no high amplitude slowing or triphasic waves to indicate severe metabolic derangement. Prasanna Lopez MD     CT Head w/o Contrast  Result Date: 7/31/2025  EXAM: CT HEAD W/O CONTRAST LOCATION: [facility] DATE: 7/31/2025 INDICATION: Fall at home with head injury COMPARISON: 11/1/2024. TECHNIQUE: Routine CT Head without IV contrast. Multiplanar reformats. Dose reduction techniques were used. FINDINGS: INTRACRANIAL CONTENTS: No intracranial hemorrhage, extraaxial collection, or mass effect.  No CT evidence of acute infarct. There is diffuse small vessel disease ischemia disease. There is stable focal encephalomalacia of the anterior left basal ganglia and caudate head. The ventricular system, basal cisterns and the cortical sulci are consistent with minimal volume loss for age. VISUALIZED ORBITS/SINUSES/MASTOIDS: No intraorbital abnormality. No paranasal sinus mucosal disease. No middle ear or mastoid effusion. BONES/SOFT TISSUES: No acute abnormality.     IMPRESSION: 1.  No CT finding of a mass, hemorrhage or focal area suggestive of acute infarct. 2.  Stable diffuse small vessel ischemic disease and focal encephalomalacia anterior left basal ganglia and caudate head. 3.  Stable minimal volume loss for age.     CT Thoracic Spine w/o Contrast  Result Date: 7/31/2025  EXAM: CT THORACIC SPINE W/O CONTRAST, CT LUMBAR SPINE RECONSTRUCTED LOCATION: [facility] DATE: 7/31/2025 INDICATION: fall, back pain COMPARISON: Lumbar spine CT dated 10/23/2024. Chest CT dated 08/08/2024. TECHNIQUE: 1) Routine CT Thoracic Spine without IV contrast. Multiplanar reformats. Dose reduction techniques were used. 2) Routine CT Lumbar Spine without IV contrast. Multiplanar reformats. Dose reduction techniques were used. FINDINGS: THORACIC SPINE CT:  VERTEBRA: Diffuse osteopenia. Unchanged moderate anterior superior endplate height loss of the T7 vertebral body. The remaining vertebral body heights are maintained. Minimal retrolisthesis at T7-T8 is unchanged. Disc height loss, degenerative endplate changes and anterior osteophytes throughout the thoracic spine. No acute fracture or posttraumatic subluxation. CANAL/FORAMINA: Small disc osteophyte complexes at multiple levels. No high-grade spinal canal or neural foraminal stenosis. PARASPINAL: Trace bilateral pleural effusions. Biapical scarring. Bilateral dependent atelectasis. Coronary calcifications. Atherosclerotic calcifications of the aortic arch and great vessels. LUMBAR SPINE CT: VERTEBRA: Vertebral body heights are maintained. Mild stepwise retrolisthesis from L1-L4 is unchanged and likely degenerative in nature. No acute fracture or posttraumatic subluxation. CANAL/FORAMINA: Disc osteophyte complexes contribute to mild spinal canal narrowing at L2-L3, L3-L4 and L4-L5. Multilevel facet arthropathy, most pronounced at L4-L5 and L5-S1. Mild neural foraminal stenosis at multiple levels. PARASPINAL: No acute extraspinal abnormality. Degenerative changes of the apposing surfaces of the spinous processes at L3-L4 and L4-L5 is suggestive of Baastrup's disease. Degenerative changes of the sacroiliac joints. Atrophy of the paravertebral muscles. Atherosclerotic calcifications of the abdominal aorta and branching vessels.     IMPRESSION: THORACIC SPINE CT: 1.  No acute fracture or posttraumatic subluxation. 2.  Unchanged chronic compression fracture deformity of the T7 vertebral body. 3.  No high-grade spinal canal or neural foraminal stenosis. 4.  Multilevel spondylosis. LUMBAR SPINE CT: 1.  No acute fracture or posttraumatic subluxation. 2.  No high-grade spinal canal or neural foraminal stenosis. 3.  Multilevel spondylosis. 4.  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     CT Lumbar Spine  Reconstructed  Result Date: 7/31/2025  EXAM: CT THORACIC SPINE W/O CONTRAST, CT LUMBAR SPINE RECONSTRUCTED LOCATION: [facility] DATE: 7/31/2025 INDICATION: fall, back pain COMPARISON: Lumbar spine CT dated 10/23/2024. Chest CT dated 08/08/2024. TECHNIQUE: 1) Routine CT Thoracic Spine without IV contrast. Multiplanar reformats. Dose reduction techniques were used. 2) Routine CT Lumbar Spine without IV contrast. Multiplanar reformats. Dose reduction techniques were used. FINDINGS: THORACIC SPINE CT: VERTEBRA: Diffuse osteopenia. Unchanged moderate anterior superior endplate height loss of the T7 vertebral body. The remaining vertebral body heights are maintained. Minimal retrolisthesis at T7-T8 is unchanged. Disc height loss, degenerative endplate changes and anterior osteophytes throughout the thoracic spine. No acute fracture or posttraumatic subluxation. CANAL/FORAMINA: Small disc osteophyte complexes at multiple levels. No high-grade spinal canal or neural foraminal stenosis. PARASPINAL: Trace bilateral pleural effusions. Biapical scarring. Bilateral dependent atelectasis. Coronary calcifications. Atherosclerotic calcifications of the aortic arch and great vessels. LUMBAR SPINE CT: VERTEBRA: Vertebral body heights are maintained. Mild stepwise retrolisthesis from L1-L4 is unchanged and likely degenerative in nature. No acute fracture or posttraumatic subluxation. CANAL/FORAMINA: Disc osteophyte complexes contribute to mild spinal canal narrowing at L2-L3, L3-L4 and L4-L5. Multilevel facet arthropathy, most pronounced at L4-L5 and L5-S1. Mild neural foraminal stenosis at multiple levels. PARASPINAL: No acute extraspinal abnormality. Degenerative changes of the apposing surfaces of the spinous processes at L3-L4 and L4-L5 is suggestive of Baastrup's disease. Degenerative changes of the sacroiliac joints. Atrophy of the paravertebral muscles. Atherosclerotic calcifications of the abdominal  aorta and branching vessels.     IMPRESSION: THORACIC SPINE CT: 1.  No acute fracture or posttraumatic subluxation. 2.  Unchanged chronic compression fracture deformity of the T7 vertebral body. 3.  No high-grade spinal canal or neural foraminal stenosis. 4.  Multilevel spondylosis. LUMBAR SPINE CT: 1.  No acute fracture or posttraumatic subluxation. 2.  No high-grade spinal canal or neural foraminal stenosis. 3.  Multilevel spondylosis. 4.  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     CT Abdomen Pelvis w/o Contrast  Result Date: 7/31/2025  EXAM: CT ABDOMEN PELVIS W/O CONTRAST LOCATION: [facility] DATE: 7/31/2025 INDICATION: fall, pain, new cirrhosis COMPARISON: CT angiogram of the chest, earlier today; MRI of the abdomen 3/3/2022 TECHNIQUE: CT scan of the abdomen and pelvis was performed without IV contrast. Multiplanar reformats were obtained. Dose reduction techniques were used. CONTRAST: None. FINDINGS: LOWER CHEST: Moderate coronary calcifications and calcification of the aortic root. Trace pleural fluid. HEPATOBILIARY: Liver is small with diffuse nodular contour consistent with cirrhosis. There is a circumscribed 2.6 cm cyst along the inferior right liver capsule (series 3, image 37). The gallbladder is distended. No gallbladder wall thickening or pericholecystic inflammation. No bile duct enlargement. PANCREAS: Normal. SPLEEN: Normal. ADRENAL GLANDS: Normal. KIDNEYS/BLADDER: Bilateral parapelvic renal cysts are present and do not require imaging workup or follow-up. Excreted contrast is present in nondilated collecting systems and the upper ureters. No nephrolithiasis or hydronephrosis. There is a urine contrast level in the urinary bladder. No bladder wall thickening or filling defects. BOWEL: There are a few sigmoid diverticula. No dilated bowel or bowel wall thickening. There is a 13 x 23 mm fat attenuation lesion within the wall of the central small bowel  (series 4, image 126) consistent with a benign lipoma. No peritoneal thickening  or ascites. LYMPH NODES: There is hazy attenuation within the central mesentery suggesting mesenteric congestion. No enlarged lymph nodes in the abdomen or pelvis. VASCULATURE: Severe atheromatous calcification of the abdominal aorta with lesser involvement of the iliac and common femoral arteries. No aneurysm. PELVIC ORGANS: Normal. MUSCULOSKELETAL: Generalized bone demineralization. Small to moderate degenerative osteophytes and related disc space narrowing. Mild lumbar facet arthropathy. No aggressive or destructive bone lesions.     IMPRESSION: 1.  Cirrhosis. Stable benign liver cyst inferior right lobe. 2.  Hazy attenuation within the central mesentery consistent with mesenteric congestion. 3.  Few distal colonic diverticula but no acute bowel inflammation or mechanical obstruction.     CTA Chest Redo Sternotomy Planning  Result Date: 7/31/2025  For Patients: As a result of the 21st Century Cures Act, medical imaging exams and procedure reports are released immediately into your electronic medical record. You may view this report before your referring provider. If you have questions, please contact your health care provider. EXAM: CTA CHEST LOCATION: [facility] DATE: 7/31/2025 INDICATION: positive dimer, shortness of breath COMPARISON: 11/1/2024 TECHNIQUE: CT chest pulmonary angiogram during arterial phase injection of IV contrast. Multiplanar reformats and MIP reconstructions were performed. Dose reduction techniques were used. CONTRAST: IOPAMIDOL 300 MG/ML  ML BOTTLE: 50mL FINDINGS: ANGIOGRAM CHEST: Pulmonary arteries are normal caliber and negative for pulmonary emboli. Thoracic aorta is negative for dissection. No CT evidence of right heart strain. LUNGS AND PLEURA: Trace bilateral effusions with bibasilar atelectasis. Small linear areas of atelectasis and scarring are noted bilaterally. Biapical  pleural-parenchymal scarring. MEDIASTINUM/AXILLAE: Heart is moderately enlarged. Subcentimeter mediastinal lymph nodes are noted. Mildly prominent right hilar lymph node measuring 1.1 cm short axis. CORONARY ARTERY CALCIFICATION: Moderate. UPPER ABDOMEN: Cirrhotic liver. Partially visualized gallbladder is distended. MUSCULOSKELETAL: Degenerative changes of the spine.    1.  No pulmonary embolism. 2.  Trace bilateral effusions with bibasilar atelectasis. 3.  Cirrhotic liver. 4.  Stable mildly prominent right hilar lymph nodes which is nonspecific.         MANAGEMENT DISCUSSED with the following over the past 24 hours: patient, patient's family   NOTE(S)/MEDICAL RECORDS REVIEWED over the past 24 hours: reviewed  Tests ORDERED & REVIEWED in the past 24 hours:  - See lab/imaging results included in the data section of the note  SUPPLEMENTAL HISTORY, in addition to the patient's history, over the past 24 hours obtained from:   - daughter, son  Medical complexity over the past 24 hours:  - Intensive monitoring for MEDICATION TOXICITY  - IV abx, Salmonella infection, work up for additional infections, antibiotics

## 2025-08-02 NOTE — PLAN OF CARE
Problem: Adult Inpatient Plan of Care  Goal: Optimal Comfort and Wellbeing  8/2/2025 1824 by Kimberly Dia RN  Outcome: Progressing  8/2/2025 1336 by Kimberly Dia RN  Outcome: Progressing     Problem: Heart Failure  Goal: Fluid and Electrolyte Balance  8/2/2025 1824 by Kimberly Dia RN  Outcome: Progressing     Problem: Heart Failure  Goal: Effective Oxygenation and Ventilation  8/2/2025 1824 by Kimberly Dia RN  Outcome: Progressing     Problem: Fall Injury Risk  Goal: Absence of Fall and Fall-Related Injury  8/2/2025 1824 by Kimberly Dia RN  Outcome: Progressing     Problem: Infection  Goal: Absence of Infection Signs and Symptoms  8/2/2025 1824 by Kimberly Dia RN  Outcome: Progressing     Problem: Gas Exchange Impaired  Goal: Optimal Gas Exchange  Outcome: Progressing     Pt A/Ox4 with intermittent confusion. Pleasant and oriented this shift. Up in chair for meals with A1 walker/belt. Remains on 1-2L NC 02 to maintain sats >90% per orders. X1 loose stool this shift. HR's this am 100s-130s, improvement noted with decreased rates after am po dose of metoprolol. Plan of care continues.

## 2025-08-02 NOTE — PLAN OF CARE
Problem: Dysrhythmia  Goal: Normalized Cardiac Rhythm  Outcome: Not Progressing     Problem: Heart Failure  Goal: Stable Heart Rate and Rhythm  Outcome: Not Progressing     Problem: Adult Inpatient Plan of Care  Goal: Optimal Comfort and Wellbeing  Intervention: Monitor Pain and Promote Comfort  Recent Flowsheet Documentation  Taken 8/2/2025 0400 by Hattie Carbajal  Pain Management Interventions:   medication (see MAR)   breathing exercises   care clustered   cold applied   declines   distraction   emotional support   diversional activity provided   environmental changes   pillow support provided   quiet environment facilitated   relaxation techniques promoted   repositioned   rest   therapeutic presence   therapeutic touch  Taken 8/2/2025 0000 by Hattie Carbajal  Pain Management Interventions:   medication (see MAR)   breathing exercises   care clustered   cold applied   declines   distraction   emotional support   diversional activity provided   environmental changes   pillow support provided   quiet environment facilitated   relaxation techniques promoted   repositioned   rest   therapeutic presence   therapeutic touch  Taken 8/1/2025 2000 by Hattie Carbajal  Pain Management Interventions:   medication (see MAR)   breathing exercises   care clustered   cold applied   declines   distraction   emotional support   diversional activity provided   environmental changes   pillow support provided   quiet environment facilitated   relaxation techniques promoted   repositioned   rest   therapeutic presence   therapeutic touch     Problem: Delirium  Goal: Improved Sleep  Intervention: Promote Sleep  Recent Flowsheet Documentation  Taken 8/2/2025 0400 by Hattie Carbajal  Sleep/Rest Enhancement:   awakenings minimized   consistent schedule promoted   noise level reduced   regular sleep/rest pattern promoted   relaxation techniques promoted   room darkened  Taken 8/2/2025 0000 by Hattie Carbajal  Sleep/Rest Enhancement:   awakenings  minimized   consistent schedule promoted   noise level reduced   regular sleep/rest pattern promoted   relaxation techniques promoted   room darkened  Taken 8/1/2025 2000 by Hattie Carbajal  Sleep/Rest Enhancement:   awakenings minimized   consistent schedule promoted   noise level reduced   regular sleep/rest pattern promoted   relaxation techniques promoted   room darkened     Problem: Heart Failure  Goal: Optimal Cardiac Output  Intervention: Optimize Cardiac Output  Recent Flowsheet Documentation  Taken 8/2/2025 0400 by Hattie Carbajal  Environmental Support:   calm environment promoted   comfort object encouraged   distractions minimized   environmental consistency promoted   rest periods encouraged  Taken 8/2/2025 0000 by Hattie Carbajal  Environmental Support:   calm environment promoted   comfort object encouraged   distractions minimized   environmental consistency promoted   rest periods encouraged  Taken 8/1/2025 2000 by Hattie Carbajal  Environmental Support:   calm environment promoted   comfort object encouraged   distractions minimized   environmental consistency promoted   rest periods encouraged    Problem: Infection  Goal: Absence of Infection Signs and Symptoms  Intervention: Prevent or Manage Infection  Recent Flowsheet Documentation  Taken 8/2/2025 0400 by Hattie Carbajal  Isolation Precautions: contact precautions maintained  Taken 8/2/2025 0000 by Hattie Carbajal  Isolation Precautions: contact precautions maintained  Taken 8/1/2025 2000 by Hattie Carbajal  Isolation Precautions: contact precautions maintained      Goal Outcome Evaluation:    Cardiac: A-fib with RVR and BBB. Rates 90s - 110s.   Resp: LS diminished in bases. Sat maintaining mid 90s on 2 L. Rate 16 - 20. SOB with activity.    Neuro: Intermittent confusion. Disoriented to time. Will set off the bed alarm. SLUMS request made.   GI: BS present x4. 2 BM during HS/NOC. Passing flatus.   : Nominal UOP. See I&O.   Pain: Denies.   Ambulation: Ax1  with gait belt and walker. Ambulated x4 to bathroom during HS/NOC.   Labs: AM labs pending.  Stool specimen + Salmonella.  Hospitalist notified.  Plan: Monitor telemetry, seizure precautions, S&S of infection and fall risks.     RSP - RN.

## 2025-08-02 NOTE — PROGRESS NOTES
Stool specimen results, + Salmonella.  Negative for C-dif.  Let me know what you want me to do?  Currently, she is isolated, Enteric precautions for the r/o C-dif.  Hospitalist (Ishaan) notified of aforementioned.  Awaiting reply.

## 2025-08-02 NOTE — PROGRESS NOTES
Assessment/Plan:  1.  Newly diagnosed atrial fibrillation with RVR: The patient's ventricular rate is better controlled with metoprolol  mg daily.  Continue to monitor. Her [id number] score is 4.  Spent a long time to discuss chronic anticoagulation for prevention of a stroke.  After long discussion, the family decided no anticoagulation for this 89 years old pleasant woman at this time.  Start aspirin 81 mg daily.  If the family changed their decision, they will let us know.  2.  Benign essential hypertension: Her blood pressure is 130/65 this morning.  Continue metoprolol  mg daily.  Hold amlodipine.  3.  Left bundle branch block: Echo reported normal LV size and systolic function.  4.  Electrolyte disorder, acute renal injury and other chronic medical conditions: Please see primary team of management for details.    Subjective:  Interval History:  Has no complaints of chest pain or shortness of breath.  No palpitations.  No leg edema.    Current Medications:  Scheduled Meds:  Current Facility-Administered Medications   Medication Dose Route Frequency Provider Last Rate Last Admin    acetaminophen (TYLENOL) tablet 975 mg  975 mg Oral TID Halina Mason PA-C   975 mg at 08/02/25 0901    [Held by provider] amLODIPine (NORVASC) tablet 10 mg  10 mg Oral QPM Halina Mason PA-C        cefTRIAXone (ROCEPHIN) 1 g vial to attach to  mL bag for ADULTS or NS 50 mL bag for PEDS  1 g Intravenous Q24H Tanna Jasso MD   1 g at 08/02/25 0914    colestipol (COLESTID) tablet 1 g  1 g Oral BID Halina Mason PA-C   1 g at 08/02/25 0902    folic acid (FOLVITE) tablet 1 mg  1 mg Oral Daily Tanna Jasso MD   1 mg at 08/02/25 0902    [Held by provider] hydrochlorothiazide (HYDRODIURIL) tablet 25 mg  25 mg Oral Halina Suarez PA-C        losartan (COZAAR) tablet 50 mg  50 mg Oral Tanna Olivares MD   50 mg at 08/02/25 1026    metoprolol succinate ER (TOPROL XL) 24 hr tablet 100 mg  100 mg Oral  QAM Vaishali James PA-C   100 mg at 08/02/25 0902    phenytoin (DILANTIN) ER capsule 100 mg  100 mg Oral QPM Halina Mason PA-C   100 mg at 08/01/25 2008    potassium & sodium phosphates (NEUTRA-PHOS) Packet 1 packet  1 packet Oral or Feeding Tube Q4H Tanna Jasso MD   1 packet at 08/02/25 0904    pravastatin (PRAVACHOL) tablet 40 mg  40 mg Oral At Bedtime Halina Mason PA-C   40 mg at 07/31/25 2146    sodium chloride (PF) 0.9% PF flush 3 mL  3 mL Intracatheter Q8H [place] Halina Mason PA-C   3 mL at 08/02/25 0620    thiamine (B-1) injection 100 mg  100 mg Intravenous Daily Tanna Jasso MD   100 mg at 08/02/25 0908    traZODone (DESYREL) tablet 100 mg  100 mg Oral At Bedtime Halina Mason PA-C   100 mg at 08/01/25 2012       Objective:   Vital signs in last 24 hours:  Temp:  [97.6  F (36.4  C)-98.6  F (37  C)] 97.7  F (36.5  C)  Pulse:  [] 105  Resp:  [16-20] 20  BP: (130-150)/(65-97) 130/75  SpO2:  [89 %-95 %] 95 %  Weight:   [unfilled]     Physical Exam:  General Appearance:   Awake, Alert, No acute distress.   HEENT:  No scleral icterus; the mucous membranes were moist.   Neck: No cervical bruits. No jugular venous distention   Lungs:   Lungs are clear to auscultation. No crackles. No wheezing.   Cardiovascular:   IRRR, normal first and second heart sounds with no murmurs. No rubs or gallops.     Abdomen:  Soft. No tenderness. Bowels sounds are present   Extremities: No leg edema. Equal posterior tibial pulsese.   Skin: Warm, Dry. No rashes.   Neurologic: Mood and affect are appropriate. No focal deficits.         Cardiographics:   Report: personally reviewed. .      Tele monitoring - atrial fibrillation with ventricular rate 90 to 110 bpm    Echocardiogram on August 1, 2025:    Left ventricular size, wall motion and function are normal. The ejection  fraction is 60-65%.  Normal right ventricle size and systolic function.  The left atrium is severely dilated. The right atrium is mildly dilated.  No  "hemodynamically significant valvular abnormalities on 2D or color flow  imaging.  Compared to the prior study, atrial fibrillation is now present.      Lab Results:   personally reviewed.     Lab Results   Component Value Date     08/02/2025    CO2 27 08/02/2025    BUN 17.3 08/02/2025     No results found for: \"CKTOTAL\", \"CKMB\", \"TROPONINI\"  Lab Results   Component Value Date    WBC 6.6 08/02/2025    HGB 11.6 08/02/2025    HCT 34.6 08/02/2025    MCV 93 08/02/2025    PLT 89 08/02/2025     No results found for: \"CHOL\", \"TRIG\", \"HDL\", \"LDL\", \"LDLDIRECT\"        "

## 2025-08-03 ENCOUNTER — APPOINTMENT (OUTPATIENT)
Dept: OCCUPATIONAL THERAPY | Facility: HOSPITAL | Age: OVER 89
End: 2025-08-03
Payer: COMMERCIAL

## 2025-08-03 ENCOUNTER — APPOINTMENT (OUTPATIENT)
Dept: RADIOLOGY | Facility: HOSPITAL | Age: OVER 89
End: 2025-08-03
Attending: INTERNAL MEDICINE
Payer: COMMERCIAL

## 2025-08-03 LAB
ANION GAP SERPL CALCULATED.3IONS-SCNC: 8 MMOL/L (ref 7–15)
BUN SERPL-MCNC: 17.1 MG/DL (ref 8–23)
CALCIUM SERPL-MCNC: 8.4 MG/DL (ref 8.8–10.4)
CHLORIDE SERPL-SCNC: 103 MMOL/L (ref 98–107)
CREAT SERPL-MCNC: 0.85 MG/DL (ref 0.51–0.95)
EGFRCR SERPLBLD CKD-EPI 2021: 63 ML/MIN/1.73M2
ERYTHROCYTE [DISTWIDTH] IN BLOOD BY AUTOMATED COUNT: 13.2 % (ref 10–15)
GLUCOSE SERPL-MCNC: 122 MG/DL (ref 70–99)
HCO3 SERPL-SCNC: 25 MMOL/L (ref 22–29)
HCT VFR BLD AUTO: 34.2 % (ref 35–47)
HGB BLD-MCNC: 11.3 G/DL (ref 11.7–15.7)
MAGNESIUM SERPL-MCNC: 1.9 MG/DL (ref 1.7–2.3)
MCH RBC QN AUTO: 31.1 PG (ref 26.5–33)
MCHC RBC AUTO-ENTMCNC: 33 G/DL (ref 31.5–36.5)
MCV RBC AUTO: 94 FL (ref 78–100)
PHOSPHATE SERPL-MCNC: 3.4 MG/DL (ref 2.5–4.5)
PLATELET # BLD AUTO: 105 10E3/UL (ref 150–450)
POTASSIUM SERPL-SCNC: 3.9 MMOL/L (ref 3.4–5.3)
RBC # BLD AUTO: 3.63 10E6/UL (ref 3.8–5.2)
SODIUM SERPL-SCNC: 136 MMOL/L (ref 135–145)
WBC # BLD AUTO: 6.1 10E3/UL (ref 4–11)

## 2025-08-03 PROCEDURE — 97110 THERAPEUTIC EXERCISES: CPT | Mod: GO

## 2025-08-03 PROCEDURE — 84100 ASSAY OF PHOSPHORUS: CPT | Performed by: INTERNAL MEDICINE

## 2025-08-03 PROCEDURE — 97166 OT EVAL MOD COMPLEX 45 MIN: CPT | Mod: GO

## 2025-08-03 PROCEDURE — 99233 SBSQ HOSP IP/OBS HIGH 50: CPT | Performed by: INTERNAL MEDICINE

## 2025-08-03 PROCEDURE — 83735 ASSAY OF MAGNESIUM: CPT | Performed by: INTERNAL MEDICINE

## 2025-08-03 PROCEDURE — 71045 X-RAY EXAM CHEST 1 VIEW: CPT

## 2025-08-03 PROCEDURE — 85027 COMPLETE CBC AUTOMATED: CPT | Performed by: INTERNAL MEDICINE

## 2025-08-03 PROCEDURE — 97535 SELF CARE MNGMENT TRAINING: CPT | Mod: GO

## 2025-08-03 PROCEDURE — 250N000013 HC RX MED GY IP 250 OP 250 PS 637

## 2025-08-03 PROCEDURE — 250N000013 HC RX MED GY IP 250 OP 250 PS 637: Performed by: INTERNAL MEDICINE

## 2025-08-03 PROCEDURE — 250N000011 HC RX IP 250 OP 636: Performed by: INTERNAL MEDICINE

## 2025-08-03 PROCEDURE — 250N000013 HC RX MED GY IP 250 OP 250 PS 637: Performed by: PHYSICIAN ASSISTANT

## 2025-08-03 PROCEDURE — 99232 SBSQ HOSP IP/OBS MODERATE 35: CPT | Performed by: INTERNAL MEDICINE

## 2025-08-03 PROCEDURE — 80048 BASIC METABOLIC PNL TOTAL CA: CPT | Performed by: INTERNAL MEDICINE

## 2025-08-03 PROCEDURE — 120N000004 HC R&B MS OVERFLOW

## 2025-08-03 PROCEDURE — 83880 ASSAY OF NATRIURETIC PEPTIDE: CPT | Performed by: HOSPITALIST

## 2025-08-03 PROCEDURE — 36415 COLL VENOUS BLD VENIPUNCTURE: CPT | Performed by: INTERNAL MEDICINE

## 2025-08-03 RX ORDER — DILTIAZEM HYDROCHLORIDE 120 MG/1
120 CAPSULE, COATED, EXTENDED RELEASE ORAL DAILY
Status: DISCONTINUED | OUTPATIENT
Start: 2025-08-03 | End: 2025-08-04

## 2025-08-03 RX ORDER — MAGNESIUM OXIDE 400 MG/1
400 TABLET ORAL EVERY 4 HOURS
Status: COMPLETED | OUTPATIENT
Start: 2025-08-03 | End: 2025-08-03

## 2025-08-03 RX ORDER — ASPIRIN 81 MG/1
81 TABLET ORAL DAILY
Status: DISCONTINUED | OUTPATIENT
Start: 2025-08-03 | End: 2025-08-07 | Stop reason: HOSPADM

## 2025-08-03 RX ADMIN — ASPIRIN 81 MG: 81 TABLET, COATED ORAL at 10:08

## 2025-08-03 RX ADMIN — METOPROLOL SUCCINATE 100 MG: 100 TABLET, EXTENDED RELEASE ORAL at 09:53

## 2025-08-03 RX ADMIN — PRAVASTATIN SODIUM 40 MG: 20 TABLET ORAL at 20:56

## 2025-08-03 RX ADMIN — ACETAMINOPHEN 975 MG: 325 TABLET ORAL at 20:55

## 2025-08-03 RX ADMIN — MAGNESIUM OXIDE TAB 400 MG (241.3 MG ELEMENTAL MG) 400 MG: 400 (241.3 MG) TAB at 09:53

## 2025-08-03 RX ADMIN — FOLIC ACID 1 MG: 1 TABLET ORAL at 09:53

## 2025-08-03 RX ADMIN — PHENYTOIN SODIUM 100 MG: 100 CAPSULE, EXTENDED RELEASE ORAL at 20:55

## 2025-08-03 RX ADMIN — MAGNESIUM OXIDE TAB 400 MG (241.3 MG ELEMENTAL MG) 400 MG: 400 (241.3 MG) TAB at 14:56

## 2025-08-03 RX ADMIN — DILTIAZEM HYDROCHLORIDE 120 MG: 120 CAPSULE, EXTENDED RELEASE ORAL at 09:53

## 2025-08-03 RX ADMIN — MONTELUKAST SODIUM 1 G: 4 TABLET, CHEWABLE ORAL at 11:07

## 2025-08-03 RX ADMIN — ACETAMINOPHEN 975 MG: 325 TABLET ORAL at 09:53

## 2025-08-03 RX ADMIN — TRAZODONE HYDROCHLORIDE 100 MG: 50 TABLET ORAL at 20:56

## 2025-08-03 RX ADMIN — MONTELUKAST SODIUM 1 G: 4 TABLET, CHEWABLE ORAL at 20:55

## 2025-08-03 RX ADMIN — ACETAMINOPHEN 975 MG: 325 TABLET ORAL at 16:36

## 2025-08-03 RX ADMIN — CEFTRIAXONE SODIUM 1 G: 1 INJECTION, POWDER, FOR SOLUTION INTRAMUSCULAR; INTRAVENOUS at 10:12

## 2025-08-03 RX ADMIN — THIAMINE HYDROCHLORIDE 100 MG: 100 INJECTION, SOLUTION INTRAMUSCULAR; INTRAVENOUS at 09:59

## 2025-08-03 ASSESSMENT — ACTIVITIES OF DAILY LIVING (ADL)
ADLS_ACUITY_SCORE: 59
ADLS_ACUITY_SCORE: 55
ADLS_ACUITY_SCORE: 59
ADLS_ACUITY_SCORE: 55
ADLS_ACUITY_SCORE: 59
ADLS_ACUITY_SCORE: 55
ADLS_ACUITY_SCORE: 59
ADLS_ACUITY_SCORE: 55
ADLS_ACUITY_SCORE: 55
ADLS_ACUITY_SCORE: 59
ADLS_ACUITY_SCORE: 56
ADLS_ACUITY_SCORE: 55
ADLS_ACUITY_SCORE: 59
ADLS_ACUITY_SCORE: 56
ADLS_ACUITY_SCORE: 55
ADLS_ACUITY_SCORE: 56

## 2025-08-03 NOTE — PROGRESS NOTES
[facility]    Medicine Progress Note - Hospitalist Service    Date of Admission:  7/31/2025    Assessment & Plan   Chavez Tavares is a 89 year old female with PMHx of HTN, seizure disorder, DLD, PACs, LBBB who was admitted on 7/31/2025. She initially presented to ER for evaluation of back pain and SOB, sent to ER for further evaluation and management of new A fib with RVR , and Salmonella infection     1.Afib with RVR -new  -Elevated Troponin , no cp  -- EKG atrial fib with LBBB (known since 2024)  -- Trop 49 ; 48, 45  -- increased metoprolol to 100 mg still tachy  --cards adding diltiazem 120 mg every day on 8/3/25  --stopped amlodipine given softer pressures at admit   -- Cardiac monitoring   -- Cardiology consult   -- Electrolyte replacement protocols   -- CHADS score of at least 4 - I am concerned with thrombocytopenia and falls--cards to eval--and they did discuss anticoagulation with family and they declined   --asa   -will also do referral for sleep eval(family notes snoring loud and never tested for SATISH)     2.Elevated BNP  -stop ivf  -echo  Left ventricular size, wall motion and function are normal. The ejection  fraction is 60-65%.  Normal right ventricle size and systolic function.  The left atrium is severely dilated. The right atrium is mildly dilated.  No hemodynamically significant valvular abnormalities on 2D or color flow  imaging.  Compared to the prior study, atrial fibrillation is now present.       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--will ask spine to see--they did, PT and follow up in clinic   -- Pain control with scheduled Tylenol, lido patch, low dose PRN oxycodone   -- PT/OT/CM  -- Fall precautions     6.Shortness of Breath   Fatigue   Deconditioning   Endorses SOB  Likely multifactorial in the setting of Afib with RVR,  -- Management of Afib as above  -- CT chest negative for PE   -- Checked respiratory panel -neg  -- Monitor for hypoxia  -- PT/OT/CM      7.Elevated Inflammatory Markers   .3   -- Normal WBC and procal   -- Afebrile   -- Follow-up Bcx   -- Follow-up respiratory panel   --treat uti  -also could be up from gi Salmonella issue   --I suspect this could be up from severe spine changes  as well      8.Hypomagnesemia - replacement protocols   Hypokalemia - replacement protocols      9.Essential HTN   -- HOLD PTA losartan and hydrochlorothiazide given LUCI and hypokalemia   -- Continue  metoprolol higher dose    10.Abnl UA  -concern uti  -ceftriaxone and check UC    11.Acute encephalopathy In pt with cognitive decline   -could be toxic uti, also at risk age, hospital delirium  -1:1  -treat uti  -check ammonia --ok  -head ct neg acute  -will also ask neuro to see with zoran gaspar , check eeg    12.Cirrhosis on scan  -not clear if this was known,   -check LFT's and inr, ammonia  -likely causing low plt  -I did speak to family and they were aware and pt still drinks wine  -started thiamine and folic acid  Watch for withdrawal symptoms  -told family she needs to stop    13.Thrombocytopenia  -likely from above  -trend, now up to 105    14.Loose stools--Salmonella confirmed  -now per family loose stools pre-dates admit  -pt not sure on time  -had 5-6 loose stools last few days--today just one now  -now stool cx pos Salmonella  -on precautions  -Id seeing  -ceftriaxone for 7 days, now day 3  -so far blood cx NTD    15.HLD - continue PTA statin  "and colestipol     16.History of Seizure Disorder - continue PTA phenytoin   -checked eeg-no sz on eeg here  -neuro did see      Here now days and then tcu    --At 1254 I called daughter--  to update --I did reach her           Diet: Combination Diet Low Saturated Fat Sodium <2400mg Diet    DVT Prophylaxis: Pneumatic Compression Devices  Harris Catheter: Not present  Lines: None     Cardiac Monitoring: ACTIVE order. Indication: Tachyarrhythmias, acute (48 hours)  Code Status: No CPR- Do NOT Intubate      Clinically Significant Risk Factors         # Hyponatremia: Lowest Na = 134 mmol/L in last 2 days, will monitor as appropriate       # Hypoalbuminemia: Lowest albumin = 3.2 g/dL at 7/31/2025  2:25 PM, will monitor as appropriate  # Coagulation Defect: INR = 1.17 (Ref range: 0.85 - 1.15) and/or PTT = N/A, will monitor for bleeding  # Thrombocytopenia: Lowest platelets = 89 in last 2 days, will monitor for bleeding              # Overweight: Estimated body mass index is 28.88 kg/m  as calculated from the following:    Height as of this encounter: 1.676 m (5' 6\").    Weight as of this encounter: 81.1 kg (178 lb 14.4 oz)., PRESENT ON ADMISSION     # Financial/Environmental Concerns: none         Social Drivers of Health    Tobacco Use: Medium Risk (7/31/2025)    Patient History     Smoking Tobacco Use: Former     Smokeless Tobacco Use: Unknown          Disposition Plan     Medically Ready for Discharge: Anticipated in 2-4 Days             Tanna Jasso MD  Hospitalist Service  [facility]  Securely message with [employer] (more info)  Text page via [employer] Paging/Directory   ______________________________________________________________________    Interval History   She notes one stool today so far  No abd pain  No cp  Still has some sanders  HR still not controlled      Physical Exam   Vital Signs: Temp: 98.7  F (37.1  C) Temp src: Oral BP: 109/82 Pulse: 112   Resp: 20 SpO2: 92 % O2 Device: Nasal " cannula Oxygen Delivery: 2 LPM  Weight: 178 lbs 14.4 oz    Constitutional: awake, fatigued, alert, cooperative, and no apparent distress  Eyes: sclera clear  Respiratory: no increased work of breathing, good air exchange, no retractions, and clear to auscultation  Cardiovascular: irregularly irregular rhythm and normal S1 and S2  GI: normal bowel sounds, soft, non-distended, and non-tender  Skin: no bruising or bleeding  Musculoskeletal: no lower extremity pitting edema present  Neurologic: Mental Status Exam:  Level of Alertness:   awake  Neuropsychiatric: General: normal, calm, and normal eye contact    Medical Decision Making       55 MINUTES SPENT BY ME on the date of service doing chart review, history, exam, documentation & further activities per the note.      Data     I have personally reviewed the following data over the past 24 hrs:    6.1  \   11.3 (L)   / 105 (L)     136 103 17.1 /  122 (H)   3.9 25 0.85 \       Imaging results reviewed over the past 24 hrs:   No results found for this or any previous visit (from the past 24 hours).

## 2025-08-03 NOTE — PLAN OF CARE
Problem: Dysrhythmia  Goal: Normalized Cardiac Rhythm  8/3/2025 0444 by Hattie Carbajal  Outcome: Not Progressing  8/3/2025 0442 by Hattie Carbajal  Outcome: Progressing     Problem: Heart Failure  Goal: Stable Heart Rate and Rhythm  8/3/2025 0444 by Hattie Carbajal  Outcome: Not Progressing  8/3/2025 0442 by Hattie Carbajal  Outcome: Progressing     Problem: Gas Exchange Impaired  Goal: Optimal Gas Exchange  8/3/2025 0444 by Hattie Carbajal  Outcome: Not Progressing  8/3/2025 0442 by Hattie Carbajal  Outcome: Not Progressing  Intervention: Optimize Oxygenation and Ventilation  Recent Flowsheet Documentation  Taken 8/3/2025 0400 by Hattie Carbajal  Head of Bed (HOB) Positioning: HOB at 20-30 degrees  Taken 8/3/2025 0000 by Hattie Carbajal  Head of Bed (HOB) Positioning: HOB at 20-30 degrees  Taken 8/2/2025 2000 by Hattie Carbajal  Head of Bed (HOB) Positioning: HOB at 20-30 degrees      Problem: Adult Inpatient Plan of Care  Goal: Absence of Hospital-Acquired Illness or Injury  Intervention: Prevent Infection  Recent Flowsheet Documentation  Taken 8/3/2025 0400 by Hattie Carbajal  Infection Prevention:   rest/sleep promoted   cohorting utilized   environmental surveillance performed   equipment surfaces disinfected   hand hygiene promoted   personal protective equipment utilized   single patient room provided  Taken 8/3/2025 0000 by Hattie Carbajal  Infection Prevention:   rest/sleep promoted   cohorting utilized   environmental surveillance performed   equipment surfaces disinfected   hand hygiene promoted   personal protective equipment utilized   single patient room provided  Taken 8/2/2025 2000 by Hattie Carbajal  Infection Prevention:   rest/sleep promoted   cohorting utilized   environmental surveillance performed   equipment surfaces disinfected   hand hygiene promoted   personal protective equipment utilized   single patient room provided     Problem: Adult Inpatient Plan of Care  Goal: Optimal Comfort and Wellbeing  Intervention:  Monitor Pain and Promote Comfort  Recent Flowsheet Documentation  Taken 8/3/2025 0400 by Hattie Carbajal  Pain Management Interventions:   care clustered   distraction   diversional activity provided   emotional support   environmental changes   pillow support provided   quiet environment facilitated   relaxation techniques promoted   rest   repositioned   therapeutic presence  Taken 8/3/2025 0000 by Hattie Carbajal  Pain Management Interventions:   care clustered   distraction   diversional activity provided   emotional support   environmental changes   pillow support provided   quiet environment facilitated   relaxation techniques promoted   rest   repositioned   therapeutic presence  Taken 8/2/2025 2000 by Hattie Carbajal  Pain Management Interventions:   care clustered   distraction   diversional activity provided   emotional support   environmental changes   pillow support provided   quiet environment facilitated   relaxation techniques promoted   rest   repositioned   therapeutic presence     Problem: Delirium  Goal: Improved Sleep  8/3/2025 0444 by Hattie Carbajal  Outcome: Progressing  8/3/2025 0442 by Hattie Carbajal  Outcome: Progressing      Goal Outcome Evaluation:    Cardiac: A-fib with RVR / rate control. Rates trending 90s - 110s.   Resp: LS diminished in bases bilat. SOB and wheezing with activity. Declined inhaler. Sat maintaining mid 90s with 2 L O2 NC. Rate 16 - 20 dyspnea with exertion.   Neuro: A&O x3, disoriented to place. Intermittent confusion.   GI: BS present x4. 1 BM during HS/NOC. See I&O. Passing flatus.   : Nominal UOP. See I&O.   Pain: Denies.   Ambulation: Ax1 with gait-belt and walker. Ambulated once to bathroom during HS/NOC.   Labs: AM labs pending.   Plan: Monitor O2, seizure precautions, and telemetry.     RSP - RN.

## 2025-08-03 NOTE — PROGRESS NOTES
"   08/03/25 1015   Appointment Info   Signing Clinician's Name / Credentials (OT) Shawna Paddy, OTR/L   Living Environment   People in Home alone   Current Living Arrangements house  ([facility])   Home Accessibility stairs to enter home   Number of Stairs, Main Entrance 2   Stair Railings, Main Entrance railings safe and in good condition   Transportation Anticipated family or friend will provide   Living Environment Comments All needs met on main level   Self-Care   Equipment Currently Used at Home cane, straight;walker, rolling   Fall history within last six months yes   Number of times patient has fallen within last six months 1   Activity/Exercise/Self-Care Comment Independent with ADLs at baseline.   Instrumental Activities of Daily Living (IADL)   IADL Comments Independent with cooking and laundry, has housekeeping services   General Information   Onset of Illness/Injury or Date of Surgery 07/31/25   Referring Physician Halina Mason PA-C   Patient/Family Therapy Goal Statement (OT) none stated   Additional Occupational Profile Info/Pertinent History of Current Problem Per chart review, pt \"is a 89 year old female with PMHx of HTN, seizure disorder, DLD, PACs, LBBB who was admitted on 7/31/2025. She initially presented to ER for evaluation of back pain and SOB, sent to ER for further evaluation and management of new A fib with RVR , and Salmonella infection\"   Existing Precautions/Restrictions oxygen therapy device and L/min;fall  (1 LPM via NC)   General Observations and Info Pt fatigues quickly with activity.   Cognitive Status Examination   Orientation Status orientation to person, place and time  (A&Ox4)   Cognitive Status Comments difficulty with recall noted   Pain Assessment   Patient Currently in Pain No   Range of Motion Comprehensive   General Range of Motion bilateral upper extremity ROM WNL   Strength Comprehensive (MMT)   Comment, General Manual Muscle Testing (MMT) Assessment generalized weakness " noted functionally   Transfers   Transfers sit-stand transfer;toilet transfer   Sit-Stand Transfer   Sit-Stand Johnson (Transfers) contact guard   Assistive Device (Sit-Stand Transfers) walker, front-wheeled   Toilet Transfer   Type (Toilet Transfer) sit-stand;stand-sit   Johnson Level (Toilet Transfer) contact guard   Assistive Device (Toilet Transfer) walker, front-wheeled;grab bars/safety frame   Balance   Balance Comments No significant balance deficits noted with functional mobility ambulating with FWW, CGA   Activities of Daily Living   BADL Assessment/Intervention lower body dressing;grooming   Lower Body Dressing Assessment/Training   Johnson Level (Lower Body Dressing) minimum assist (75% patient effort)   Comment, (Lower Body Dressing) doffing brief   Grooming Assessment/Training   Johnson Level (Grooming) contact guard assist   Position (Grooming) sink side;unsupported standing   Clinical Impression   Criteria for Skilled Therapeutic Interventions Met (OT) Yes, treatment indicated   OT Diagnosis Impaired ability to perform ADLs, IADLs, and functional mobility.   Influenced by the following impairments salmonella enteritis   OT Problem List-Impairments impacting ADL problems related to;mobility;cognition;balance;activity tolerance impaired;strength   Assessment of Occupational Performance 3-5 Performance Deficits   Identified Performance Deficits toileting, grooming/hygiene, lower body dressing, functional endurance, cognition   Planned Therapy Interventions (OT) ADL retraining;balance training;strengthening;transfer training;home program guidelines;risk factor education;progressive activity/exercise;cognition   Clinical Decision Making Complexity (OT) detailed assessment/moderate complexity   Risk & Benefits of therapy have been explained evaluation/treatment results reviewed;care plan/treatment goals reviewed;risks/benefits reviewed;current/potential barriers reviewed;participants  voiced agreement with care plan;participants included;patient   OT Total Evaluation Time   OT Eval, Moderate Complexity Minutes (76984) 10   OT Goals   Therapy Frequency (OT) 6 times/week   OT Predicted Duration/Target Date for Goal Attainment 08/10/25   OT Goals Hygiene/Grooming;Lower Body Dressing;Toilet Transfer/Toileting;Cognition;Aerobic Activity   OT: Hygiene/Grooming modified independent;using adaptive equipment;while standing   OT: Lower Body Dressing Modified independent;using adaptive equipment   OT: Toilet Transfer/Toileting Modified independent;toilet transfer;cleaning and garment management;using adaptive equipment   OT: Cognitive Patient/caregiver will verbalize understanding of cognitive assessment results/recommendations as needed for safe discharge planning   OT: Perform aerobic activity with stable cardiovascular response intermittent activity;15 minutes   Interventions   Interventions Quick Adds Therapeutic Procedures/Exercise   Self-Care/Home Management   Self-Care/Home Mgmt/ADL, Compensatory, Meal Prep Minutes (34521) 8   Symptoms Noted During/After Treatment (Meal Preparation/Planning Training) fatigue   Treatment Detail/Skilled Intervention Pt ambulated in room with CGA using FWW, no LOB observed. Pt tolerated standing at sink to progress standing tolerance for >2 min, slight fatigue noted following. Pt completed additional lower body dressing tech (donning brief) with Min A and cueing for safety. Pt left in recliner at end of session with chair alarm on, call light in reach, and family member present in room.   Therapeutic Procedures/Exercise   Therapeutic Procedure: strength, endurance, ROM, flexibillity minutes (62564) 8   Symptoms Noted During/After Treatment fatigue   Treatment Detail/Skilled Intervention Pt performed 4x seated BUE calisthenics ex x10 reps/ex with rest breaks needed d/t fatigue. Cueing provided for PLB tech throughout. Pt declined completing additional set of ex d/t  fatigue.   OT Discharge Planning   OT Plan ambulatory ADLs, SLUMS, functional endurance   OT Discharge Recommendation (DC Rec) Transitional Care Facility   OT Rationale for DC Rec Pt deconditioned and is requiring Ax1 for all ADLs and functional mobility. Lives alone   OT Brief overview of current status CGA functional mobility, Min A dressing, CGA toilet transfer, CGA standing G/H   OT Total Distance Amb During Session (feet) 30   Total Session Time   Timed Code Treatment Minutes 16   Total Session Time (sum of timed and untimed services) 26

## 2025-08-03 NOTE — PLAN OF CARE
Problem: Adult Inpatient Plan of Care  Goal: Optimal Comfort and Wellbeing  8/3/2025 1806 by Kimberly Dia RN  Outcome: Progressing     Problem: Dysrhythmia  Goal: Normalized Cardiac Rhythm  8/3/2025 1806 by Kimberly Dia RN  Outcome: Progressing     Problem: Fall Injury Risk  Goal: Absence of Fall and Fall-Related Injury  8/3/2025 1806 by Kimberly Dia RN  Outcome: Progressing     Problem: Infection  Goal: Absence of Infection Signs and Symptoms  8/3/2025 1806 by Kimberly Dia RN  Outcome: Progressing     Problem: Gas Exchange Impaired  Goal: Optimal Gas Exchange  8/3/2025 1806 by Kimberly Dia RN  Outcome: Progressing     Pt A/Ox4. Denied pain. Up in chair for meals with Al walker/belt. Pt weaned to 1L NC to maintain sats >90% per orders. Dr. Jasso notified of the inability to wean to room air. New orders noted for CXR and incentive spirometer. Pt educated on incentive spirometer use and demonstrated understanding to writer. Pt started on po diltiazem per cardiology. Still receiving iv abx. Pt awaiting TCU placement prior to discharge. Plan of care continues.

## 2025-08-03 NOTE — PROGRESS NOTES
Assessment/Plan:  1.  Newly diagnosed atrial fibrillation with RVR: The patient's ventricular rate is still not controlled with metoprolol  mg daily. Continue Metoprolol  mg daily.  Start Diltiazem  mg daily for better rate control. Continue to monitor. Her [id number] score is 4.  Spent a long time to discuss chronic anticoagulation for prevention of stroke.  After long discussion, the family decided no anticoagulation for this 89 years old pleasant woman at this time due to high raisk of falls and risk of bleeding.  Start aspirin 81 mg daily.  If the family change their decision, they will let us know.  2.  Benign essential hypertension: Her blood pressure is controlled. Discontinued hydrochlorothiazide and amlodipine.  Hold losartan to give more blood pressure space to titrate BB and CCB for ventricular rate control.  Continue metoprolol  mg daily.  Start Diltiazem  mg daily for better rate control.  3.  Left bundle branch block: Echo reported normal LV size and systolic function.  4.  Electrolyte disorder, acute renal injury and other chronic medical conditions: Please see primary team of management for details.    Subjective:  Interval History:  Has no complaints of chest pain or shortness of breath.  No palpitations.  No leg edema.    Current Medications:  Scheduled Meds:  Current Facility-Administered Medications   Medication Dose Route Frequency Provider Last Rate Last Admin    acetaminophen (TYLENOL) tablet 975 mg  975 mg Oral TID Halina Mason PA-C   975 mg at 08/02/25 2054    cefTRIAXone (ROCEPHIN) 1 g vial to attach to  mL bag for ADULTS or NS 50 mL bag for PEDS  1 g Intravenous Q24H Tanna Jasso MD   1 g at 08/02/25 0914    colestipol (COLESTID) tablet 1 g  1 g Oral BID Halina Mason PA-C   1 g at 08/02/25 2054    diltiazem ER COATED BEADS (CARDIZEM CD/CARTIA XT) 24 hr capsule 120 mg  120 mg Oral Daily Joanne Browne MD        folic acid (FOLVITE) tablet 1 mg  1  mg Oral Daily Tanna aJsso MD   1 mg at 08/02/25 0902    [Held by provider] losartan (COZAAR) tablet 50 mg  50 mg Oral Tanna Olivares MD   50 mg at 08/02/25 1026    magnesium oxide (MAG-OX) tablet 400 mg  400 mg Oral Q4H Jalen Camara MD        metoprolol succinate ER (TOPROL XL) 24 hr tablet 100 mg  100 mg Oral Vaishali Toney PA-C   100 mg at 08/02/25 0902    phenytoin (DILANTIN) ER capsule 100 mg  100 mg Oral QPM Halina Mason PA-C   100 mg at 08/02/25 2054    pravastatin (PRAVACHOL) tablet 40 mg  40 mg Oral At Bedtime Halina Mason PA-C   40 mg at 08/02/25 2054    sodium chloride (PF) 0.9% PF flush 3 mL  3 mL Intracatheter Q8H [place] Halina Mason PA-C   3 mL at 08/03/25 0541    thiamine (B-1) injection 100 mg  100 mg Intravenous Daily Tanna Jasso MD   100 mg at 08/02/25 0908    traZODone (DESYREL) tablet 100 mg  100 mg Oral At Bedtime Halina Mason PA-C   100 mg at 08/02/25 2054       Objective:   Vital signs in last 24 hours:  Temp:  [97.5  F (36.4  C)-98.9  F (37.2  C)] 98.7  F (37.1  C)  Pulse:  [] 112  Resp:  [16-20] 20  BP: (109-130)/(59-86) 109/82  SpO2:  [92 %-96 %] 92 %  Weight:   [unfilled]     Physical Exam:  General Appearance:   Awake, Alert, No acute distress.   HEENT:  No scleral icterus; the mucous membranes were moist.   Neck: No cervical bruits. No jugular venous distention   Lungs:   Lungs are clear to auscultation. No crackles. No wheezing.   Cardiovascular:   IRRR, normal first and second heart sounds with no murmurs. No rubs or gallops.     Abdomen:  Soft. No tenderness. Bowels sounds are present   Extremities: No leg edema. Equal posterior tibial pulsese.   Skin: Warm, Dry. No rashes.   Neurologic: Mood and affect are appropriate. No focal deficits.         Cardiographics:   Report: personally reviewed. .      Tele monitoring - atrial fibrillation with ventricular rate 90 to 110 bpm    Echocardiogram on August 1, 2025:    Left ventricular size, wall motion and  "function are normal. The ejection  fraction is 60-65%.  Normal right ventricle size and systolic function.  The left atrium is severely dilated. The right atrium is mildly dilated.  No hemodynamically significant valvular abnormalities on 2D or color flow  imaging.  Compared to the prior study, atrial fibrillation is now present.      Lab Results:   personally reviewed.     Lab Results   Component Value Date     08/02/2025    CO2 27 08/02/2025    BUN 17.3 08/02/2025     No results found for: \"CKTOTAL\", \"CKMB\", \"TROPONINI\"  Lab Results   Component Value Date    WBC 6.6 08/02/2025    HGB 11.6 08/02/2025    HCT 34.6 08/02/2025    MCV 93 08/02/2025    PLT 89 08/02/2025     No results found for: \"CHOL\", \"TRIG\", \"HDL\", \"LDL\", \"LDLDIRECT\"        "

## 2025-08-03 NOTE — PROGRESS NOTES
Care Management Follow Up    Length of Stay (days): 3    Expected Discharge Date: 08/07/2025     Concerns to be Addressed: discharge planning     Patient plan of care discussed at interdisciplinary rounds: Yes    Anticipated Discharge Disposition: Transitional Care              Anticipated Discharge Services: None  Anticipated Discharge DME: None    Patient/family educated on Medicare website which has current facility and service quality ratings: yes  Education Provided on the Discharge Plan: Yes  Patient/Family in Agreement with the Plan: yes    Referrals Placed by CM/SW: Post Acute Facilities  Private pay costs discussed: Not applicable    Discussed  Partnership in Safe Discharge Planning  document with patient/family: No     Handoff Completed: No, handoff not indicated or clinically appropriate    Additional Information:  Pt status reviewed/discussed with Dr. Jasso.  Anticipate pt will remain inpatient for course of IV antibiotics.  PT/OT recommending TCU.  Primary CM team initiated TCU referrals on 8/1.  Will defer to primary CM team for on going discharge planning.     Next Steps:   Follow up on TCU referrals  Follow for discharge planning    Mildred Torres RN BSN, PHN, ACM-RN  Covering P3 Weekend RNCC available via [employer]

## 2025-08-04 ENCOUNTER — APPOINTMENT (OUTPATIENT)
Dept: PHYSICAL THERAPY | Facility: HOSPITAL | Age: OVER 89
End: 2025-08-04
Payer: COMMERCIAL

## 2025-08-04 ENCOUNTER — APPOINTMENT (OUTPATIENT)
Dept: OCCUPATIONAL THERAPY | Facility: HOSPITAL | Age: OVER 89
End: 2025-08-04
Payer: COMMERCIAL

## 2025-08-04 LAB
ANION GAP SERPL CALCULATED.3IONS-SCNC: 9 MMOL/L (ref 7–15)
BUN SERPL-MCNC: 17.6 MG/DL (ref 8–23)
CALCIUM SERPL-MCNC: 8.9 MG/DL (ref 8.8–10.4)
CHLORIDE SERPL-SCNC: 103 MMOL/L (ref 98–107)
CREAT SERPL-MCNC: 0.82 MG/DL (ref 0.51–0.95)
EGFRCR SERPLBLD CKD-EPI 2021: 66 ML/MIN/1.73M2
ERYTHROCYTE [DISTWIDTH] IN BLOOD BY AUTOMATED COUNT: 13.2 % (ref 10–15)
EST. AVERAGE GLUCOSE BLD GHB EST-MCNC: 123 MG/DL
GLUCOSE SERPL-MCNC: 124 MG/DL (ref 70–99)
HBA1C MFR BLD: 5.9 %
HCO3 SERPL-SCNC: 27 MMOL/L (ref 22–29)
HCT VFR BLD AUTO: 34.6 % (ref 35–47)
HGB BLD-MCNC: 11.7 G/DL (ref 11.7–15.7)
MAGNESIUM SERPL-MCNC: 1.8 MG/DL (ref 1.7–2.3)
MCH RBC QN AUTO: 31.6 PG (ref 26.5–33)
MCHC RBC AUTO-ENTMCNC: 33.8 G/DL (ref 31.5–36.5)
MCV RBC AUTO: 94 FL (ref 78–100)
NT-PROBNP SERPL-MCNC: 5856 PG/ML (ref 0–624)
PHOSPHATE SERPL-MCNC: 3.1 MG/DL (ref 2.5–4.5)
PLATELET # BLD AUTO: 128 10E3/UL (ref 150–450)
POTASSIUM SERPL-SCNC: 4.4 MMOL/L (ref 3.4–5.3)
RBC # BLD AUTO: 3.7 10E6/UL (ref 3.8–5.2)
SODIUM SERPL-SCNC: 139 MMOL/L (ref 135–145)
WBC # BLD AUTO: 6.4 10E3/UL (ref 4–11)

## 2025-08-04 PROCEDURE — 250N000013 HC RX MED GY IP 250 OP 250 PS 637: Performed by: INTERNAL MEDICINE

## 2025-08-04 PROCEDURE — 97110 THERAPEUTIC EXERCISES: CPT | Mod: GP

## 2025-08-04 PROCEDURE — 97535 SELF CARE MNGMENT TRAINING: CPT | Mod: GO

## 2025-08-04 PROCEDURE — 250N000009 HC RX 250

## 2025-08-04 PROCEDURE — 250N000011 HC RX IP 250 OP 636: Performed by: INTERNAL MEDICINE

## 2025-08-04 PROCEDURE — 84100 ASSAY OF PHOSPHORUS: CPT | Performed by: INTERNAL MEDICINE

## 2025-08-04 PROCEDURE — 99232 SBSQ HOSP IP/OBS MODERATE 35: CPT | Mod: FS | Performed by: INTERNAL MEDICINE

## 2025-08-04 PROCEDURE — 120N000004 HC R&B MS OVERFLOW

## 2025-08-04 PROCEDURE — 85014 HEMATOCRIT: CPT | Performed by: INTERNAL MEDICINE

## 2025-08-04 PROCEDURE — 250N000013 HC RX MED GY IP 250 OP 250 PS 637

## 2025-08-04 PROCEDURE — 83036 HEMOGLOBIN GLYCOSYLATED A1C: CPT | Performed by: INTERNAL MEDICINE

## 2025-08-04 PROCEDURE — 250N000013 HC RX MED GY IP 250 OP 250 PS 637: Performed by: PHYSICIAN ASSISTANT

## 2025-08-04 PROCEDURE — 99232 SBSQ HOSP IP/OBS MODERATE 35: CPT | Performed by: INTERNAL MEDICINE

## 2025-08-04 PROCEDURE — 97116 GAIT TRAINING THERAPY: CPT | Mod: GP

## 2025-08-04 PROCEDURE — 250N000011 HC RX IP 250 OP 636: Performed by: HOSPITALIST

## 2025-08-04 PROCEDURE — 97129 THER IVNTJ 1ST 15 MIN: CPT | Mod: GO

## 2025-08-04 PROCEDURE — 83735 ASSAY OF MAGNESIUM: CPT | Performed by: INTERNAL MEDICINE

## 2025-08-04 PROCEDURE — 80048 BASIC METABOLIC PNL TOTAL CA: CPT | Performed by: INTERNAL MEDICINE

## 2025-08-04 PROCEDURE — 36415 COLL VENOUS BLD VENIPUNCTURE: CPT | Performed by: INTERNAL MEDICINE

## 2025-08-04 RX ORDER — MAGNESIUM OXIDE 400 MG/1
400 TABLET ORAL EVERY 4 HOURS
Status: COMPLETED | OUTPATIENT
Start: 2025-08-04 | End: 2025-08-04

## 2025-08-04 RX ORDER — DILTIAZEM HYDROCHLORIDE 120 MG/1
120 CAPSULE, COATED, EXTENDED RELEASE ORAL ONCE
Status: COMPLETED | OUTPATIENT
Start: 2025-08-04 | End: 2025-08-04

## 2025-08-04 RX ORDER — FUROSEMIDE 10 MG/ML
20 INJECTION INTRAMUSCULAR; INTRAVENOUS ONCE
Status: COMPLETED | OUTPATIENT
Start: 2025-08-04 | End: 2025-08-04

## 2025-08-04 RX ORDER — CEFTRIAXONE 2 G/1
2 INJECTION, POWDER, FOR SOLUTION INTRAMUSCULAR; INTRAVENOUS EVERY 24 HOURS
Status: DISCONTINUED | OUTPATIENT
Start: 2025-08-05 | End: 2025-08-04

## 2025-08-04 RX ORDER — DILTIAZEM HYDROCHLORIDE 120 MG/1
240 CAPSULE, COATED, EXTENDED RELEASE ORAL DAILY
Status: DISCONTINUED | OUTPATIENT
Start: 2025-08-05 | End: 2025-08-07 | Stop reason: HOSPADM

## 2025-08-04 RX ORDER — CIPROFLOXACIN 500 MG/1
500 TABLET, FILM COATED ORAL EVERY 12 HOURS SCHEDULED
Status: DISCONTINUED | OUTPATIENT
Start: 2025-08-04 | End: 2025-08-07 | Stop reason: HOSPADM

## 2025-08-04 RX ADMIN — TRAZODONE HYDROCHLORIDE 100 MG: 50 TABLET ORAL at 22:05

## 2025-08-04 RX ADMIN — FUROSEMIDE 20 MG: 10 INJECTION, SOLUTION INTRAMUSCULAR; INTRAVENOUS at 00:38

## 2025-08-04 RX ADMIN — DILTIAZEM HYDROCHLORIDE 120 MG: 120 CAPSULE, EXTENDED RELEASE ORAL at 13:41

## 2025-08-04 RX ADMIN — ACETAMINOPHEN 975 MG: 325 TABLET ORAL at 13:41

## 2025-08-04 RX ADMIN — METOPROLOL TARTRATE 5 MG: 5 INJECTION INTRAVENOUS at 05:25

## 2025-08-04 RX ADMIN — CEFTRIAXONE SODIUM 1 G: 1 INJECTION, POWDER, FOR SOLUTION INTRAMUSCULAR; INTRAVENOUS at 08:16

## 2025-08-04 RX ADMIN — METOPROLOL SUCCINATE 100 MG: 100 TABLET, EXTENDED RELEASE ORAL at 08:17

## 2025-08-04 RX ADMIN — ACETAMINOPHEN 975 MG: 325 TABLET ORAL at 08:16

## 2025-08-04 RX ADMIN — ASPIRIN 81 MG: 81 TABLET, COATED ORAL at 08:16

## 2025-08-04 RX ADMIN — MAGNESIUM OXIDE TAB 400 MG (241.3 MG ELEMENTAL MG) 400 MG: 400 (241.3 MG) TAB at 11:01

## 2025-08-04 RX ADMIN — MONTELUKAST SODIUM 1 G: 4 TABLET, CHEWABLE ORAL at 11:01

## 2025-08-04 RX ADMIN — CIPROFLOXACIN 500 MG: 500 TABLET ORAL at 19:19

## 2025-08-04 RX ADMIN — FOLIC ACID 1 MG: 1 TABLET ORAL at 08:17

## 2025-08-04 RX ADMIN — PHENYTOIN SODIUM 100 MG: 100 CAPSULE, EXTENDED RELEASE ORAL at 19:19

## 2025-08-04 RX ADMIN — PRAVASTATIN SODIUM 40 MG: 20 TABLET ORAL at 22:04

## 2025-08-04 RX ADMIN — ACETAMINOPHEN 975 MG: 325 TABLET ORAL at 22:04

## 2025-08-04 RX ADMIN — MONTELUKAST SODIUM 1 G: 4 TABLET, CHEWABLE ORAL at 22:04

## 2025-08-04 RX ADMIN — THIAMINE HYDROCHLORIDE 100 MG: 100 INJECTION, SOLUTION INTRAMUSCULAR; INTRAVENOUS at 08:17

## 2025-08-04 RX ADMIN — DILTIAZEM HYDROCHLORIDE 120 MG: 120 CAPSULE, EXTENDED RELEASE ORAL at 08:16

## 2025-08-04 RX ADMIN — MAGNESIUM OXIDE TAB 400 MG (241.3 MG ELEMENTAL MG) 400 MG: 400 (241.3 MG) TAB at 08:17

## 2025-08-04 ASSESSMENT — ACTIVITIES OF DAILY LIVING (ADL)
ADLS_ACUITY_SCORE: 56

## 2025-08-04 NOTE — PROGRESS NOTES
[facility]  Infectious Disease   Progress Note     Date of Admission:  7/31/2025    Assessment & Plan   A-fib with RVR  Advanced age, 89 years old  Cirrhosis  Previous history of hypertension seizure disorder  Presented with shortness of breath, back pain, encephalopathy  Previous history of ongoing diarrhea, multiple family members at bedside helping with history  Stool PCR with Salmonella species.  Salmonella enteritis? CT chest abdomen pelvis.  Evidence of colitis.  Diverticulosis present  Possible UTI versus asymptomatic bacteriuria.  Denies any urinary symptoms  Cardiology, neurology following    PLAN  Discontinue iV CTX  No BM today or yesterday (per pt)  No abd pains  No fever  BC ng  TTE without valvular or aortic abnormalities    Do PO cipro x 10 days    Discussed with staff    Wes Calderon M.D.      ______________________________________________________________________    Interval History   No BM  Today or yest  No pain    Past medical, family, and social history reviewed, unchanged from before.  All 12 systems reviewed, negative except for the above.      Physical Exam   Vital Signs: Temp: 97.7  F (36.5  C) Temp src: Oral BP: (!) 159/69 Pulse: 93   Resp: 18 SpO2: 95 % O2 Device: Nasal cannula Oxygen Delivery: 1 LPM  Weight: 180 lbs 9.6 oz  Gen. appearance nontoxic  Eyes no conjunctivitis or icterus  Neck no stiffness or neck vein distention, no LN  Heart  no S3 or murmurs  Lungs clear no wheeze  Abdomen soft not tender  Extremities no synovitis, trace edema  Skin  no rash or emboli  Neurologic alert oriented no focal deficits      Data   Recent Results (from the past 24 hours)   Basic Metabolic Panel (Limited Occurrences)   Result Value Ref Range    Sodium 139 135 - 145 mmol/L    Potassium 4.4 3.4 - 5.3 mmol/L    Chloride 103 98 - 107 mmol/L    Carbon Dioxide (CO2) 27 22 - 29 mmol/L    Anion Gap 9 7 - 15 mmol/L    Urea Nitrogen 17.6 8.0 - 23.0 mg/dL    Creatinine 0.82 0.51 -  0.95 mg/dL    GFR Estimate 66 >60 mL/min/1.73m2    Calcium 8.9 8.8 - 10.4 mg/dL    Glucose 124 (H) 70 - 99 mg/dL   CBC with Platelets (Limited Occurrences)   Result Value Ref Range    WBC Count 6.4 4.0 - 11.0 10e3/uL    RBC Count 3.70 (L) 3.80 - 5.20 10e6/uL    Hemoglobin 11.7 11.7 - 15.7 g/dL    Hematocrit 34.6 (L) 35.0 - 47.0 %    MCV 94 78 - 100 fL    MCH 31.6 26.5 - 33.0 pg    MCHC 33.8 31.5 - 36.5 g/dL    RDW 13.2 10.0 - 15.0 %    Platelet Count 128 (L) 150 - 450 10e3/uL   Phosphorus (Limited Occurrences)   Result Value Ref Range    Phosphorus 3.1 2.5 - 4.5 mg/dL   Magnesium   Result Value Ref Range    Magnesium 1.8 1.7 - 2.3 mg/dL   Hemoglobin A1c   Result Value Ref Range    Estimated Average Glucose 123 (H) <117 mg/dL    Hemoglobin A1C 5.9 (H) <5.7 %

## 2025-08-04 NOTE — PLAN OF CARE
Problem: Dysrhythmia  Goal: Normalized Cardiac Rhythm  Outcome: Not Progressing     Problem: Heart Failure  Goal: Stable Heart Rate and Rhythm  Outcome: Not Progressing     Problem: Adult Inpatient Plan of Care  Goal: Optimal Comfort and Wellbeing  Intervention: Provide Person-Centered Care  Recent Flowsheet Documentation  Taken 8/4/2025 0400 by Hattie Carbajal  Trust Relationship/Rapport:   care explained   choices provided   emotional support provided   empathic listening provided   questions answered   questions encouraged   reassurance provided   thoughts/feelings acknowledged  Taken 8/4/2025 0000 by Hattie Carbajal  Trust Relationship/Rapport:   care explained   choices provided   emotional support provided   empathic listening provided   questions answered   questions encouraged   reassurance provided   thoughts/feelings acknowledged  Taken 8/3/2025 2000 by Hattie Carbajal  Trust Relationship/Rapport:   care explained   choices provided   emotional support provided   empathic listening provided   questions answered   questions encouraged   reassurance provided   thoughts/feelings acknowledged     Problem: Delirium  Goal: Optimal Coping  Outcome: Progressing     Problem: Delirium  Goal: Improved Behavioral Control  Intervention: Minimize Safety Risk  Recent Flowsheet Documentation  Taken 8/4/2025 0400 by Hattie Carbajal  Enhanced Safety Measures:   pain management   review medications for side effects with activity   room near unit station  Trust Relationship/Rapport:   care explained   choices provided   emotional support provided   empathic listening provided   questions answered   questions encouraged   reassurance provided   thoughts/feelings acknowledged  Taken 8/4/2025 0000 by Hattie Carbajal  Enhanced Safety Measures:   pain management   review medications for side effects with activity   room near unit station  Trust Relationship/Rapport:   care explained   choices provided   emotional support provided    empathic listening provided   questions answered   questions encouraged   reassurance provided   thoughts/feelings acknowledged  Taken 8/3/2025 2000 by Hattie Carbajal  Enhanced Safety Measures:   pain management   review medications for side effects with activity   room near unit station  Trust Relationship/Rapport:   care explained   choices provided   emotional support provided   empathic listening provided   questions answered   questions encouraged   reassurance provided   thoughts/feelings acknowledged     Problem: Delirium  Goal: Improved Sleep  Intervention: Promote Sleep  Recent Flowsheet Documentation  Taken 8/4/2025 0400 by Hattie Carbajal  Sleep/Rest Enhancement:   awakenings minimized   consistent schedule promoted   noise level reduced   regular sleep/rest pattern promoted   relaxation techniques promoted   room darkened  Taken 8/4/2025 0000 by Hattie Carbajal  Sleep/Rest Enhancement:   awakenings minimized   consistent schedule promoted   noise level reduced   regular sleep/rest pattern promoted   relaxation techniques promoted   room darkened  Taken 8/3/2025 2000 by Hattie Carbajal  Sleep/Rest Enhancement:   awakenings minimized   consistent schedule promoted   noise level reduced   regular sleep/rest pattern promoted   relaxation techniques promoted   room darkened   Goal Outcome Evaluation:    Cardiac: A-fib with RVR. Rates trending 100s - 120s.  Resp: LS diminished in all fields bilat. Sat maintaing mid 90s on 1 L O2 NC. Rate 16 -20 dyspnea on exertion. SOB with actvity.   Neuro: WNL.  GI: BS present x4. No BM during HS/NOC. Passing flatus.   : Nominal UOP. See I&O.   Pain: Denies.   Ambulation: Ax1 with walker and gait belt. Ambulated 3 times to bathroom during HS/NOC.   Labs: AM labs pending.   Plan: Monitor telemetry and BP. Fall, enteric and seizure precautions.      RSP - RN.

## 2025-08-04 NOTE — PROGRESS NOTES
[facility]    Medicine Progress Note - Hospitalist Service    Date of Admission:  7/31/2025    Assessment & Plan   Chavez Tavares is a 89 year old female with PMHx of HTN, seizure disorder, DLD, PACs, LBBB who was admitted on 7/31/2025. She initially presented to ER for evaluation of back pain and SOB, sent to ER for further evaluation and management of new A fib with RVR , and Salmonella infection     1.Afib with RVR -new  -Elevated Troponin , no cp  -- EKG atrial fib with LBBB (known since 2024)  -- Trop 49 ; 48, 45  -- increased metoprolol to 100 mg still tachy  --cards added diltiazem 120 mg every day on 8/3/25  --as of 8/4/25 resting HR still >100--so increased dilt to 240 mg  --stopped amlodipine given softer pressures at admit   -- Cardiac monitoring   -- Cardiology consult   -- Electrolyte replacement protocols   -- CHADS score of at least 4 - I am concerned with thrombocytopenia and falls--cards to eval--and they did discuss anticoagulation with family and they declined   --asa   -did referral for sleep eval(family notes snoring loud and never tested for SATISH)     2.Elevated BNP  -stop ivf  -echo  Left ventricular size, wall motion and function are normal. The ejection  fraction is 60-65%.  Normal right ventricle size and systolic function.  The left atrium is severely dilated. The right atrium is mildly dilated.  No hemodynamically significant valvular abnormalities on 2D or color flow  imaging.  Compared to the prior study, atrial fibrillation is now present.    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--spine did see-- PT and follow up in clinic   -- Pain control with scheduled Tylenol, lido patch, low dose PRN oxycodone   -- PT/OT/CM  -- Fall precautions     6.Shortness of Breath   Fatigue   Deconditioning   Endorses SOB  Likely multifactorial in the setting of Afib with RVR,  -- Management of Afib as above  -- CT chest negative for PE   -- Checked respiratory panel -neg  -- Monitor for hypoxia  -- PT/OT/CM      7.Elevated Inflammatory Markers   .3   -- Normal WBC and procal   -- Afebrile   -- Follow-up Bcx   -- Follow-up respiratory panel   --treat uti  -also could be up from gi Salmonella issue   --I suspect this could be up from severe spine changes  as well      8.Hypomagnesemia - replacement protocols   Hypokalemia - replacement protocols      9.Essential HTN   -- HOLD PTA losartan and hydrochlorothiazide given LUCI and hypokalemia   -- Continue  metoprolol higher dose    10.Abnl UA  -concern uti  -ceftriaxone and check UC    11.Acute encephalopathy In pt with cognitive decline   -could be toxic uti, also at risk age, hospital delirium  -1:1  -treat uti  -check ammonia --ok  -head ct neg acute  -will also ask neuro to see with zoran gaspar , check eeg    12.Cirrhosis on scan  -not clear if this was known,   -check LFT's and inr, ammonia  -likely causing low plt  -I did speak to family and they were aware and pt still drinks wine  -started thiamine and folic acid  Watch for withdrawal symptoms  -told family she needs to stop    13.Thrombocytopenia  -likely from above  -trend, now up to 128    14.Loose stools--Salmonella infection confirmed  -now per family loose stools pre-dates admit  -pt not sure on time  -had 5-6 loose stools last few days--today just one now  -now stool cx pos Salmonella  -on precautions  -Id seeing  -ceftriaxone for 7 days--per ID, now day  "4  -so far blood cx NTD    15.HLD - continue PTA statin and colestipol     16.History of Seizure Disorder - continue PTA phenytoin   -checked eeg-no sz on eeg here  -neuro did see      Here now days and then tcu    --I called daughter  at 1200          Diet: Combination Diet Low Saturated Fat Sodium <2400mg Diet    DVT Prophylaxis: Pneumatic Compression Devices  Harris Catheter: Not present  Lines: None     Cardiac Monitoring: ACTIVE order. Indication: Tachyarrhythmias, acute (48 hours)  Code Status: No CPR- Do NOT Intubate      Clinically Significant Risk Factors               # Hypoalbuminemia: Lowest albumin = 3.2 g/dL at 7/31/2025  2:25 PM, will monitor as appropriate   # Thrombocytopenia: Lowest platelets = 105 in last 2 days, will monitor for bleeding              # Overweight: Estimated body mass index is 29.15 kg/m  as calculated from the following:    Height as of this encounter: 1.676 m (5' 6\").    Weight as of this encounter: 81.9 kg (180 lb 9.6 oz)., PRESENT ON ADMISSION     # Financial/Environmental Concerns: none         Social Drivers of Health    Tobacco Use: Medium Risk (7/31/2025)    Patient History     Smoking Tobacco Use: Former     Smokeless Tobacco Use: Unknown          Disposition Plan     Medically Ready for Discharge: Anticipated in 2-4 Days             Tanna Jasso MD  Hospitalist Service  [facility]  Securely message with [employer] (more info)  Text page via [employer] Paging/Directory   ______________________________________________________________________    Interval History   She feels less sob  No cp  1 soft stool yesterday and thinks for sure slowing down on stools, was up to 5-6/day  No appetite still      Physical Exam   Vital Signs: Temp: 98  F (36.7  C) Temp src: Oral BP: (!) 151/87 Pulse: 110   Resp: 18 SpO2: 94 % O2 Device: Nasal cannula Oxygen Delivery: 1 LPM  Weight: 180 lbs 9.6 oz    Constitutional: awake, alert, cooperative, and no apparent " distress  Eyes: sclera clear  Respiratory: no increased work of breathing, good air exchange, no retractions, and clear to auscultation  Cardiovascular: regular rate and rhythm and normal S1 and S2  GI: normal bowel sounds, soft, non-distended, and non-tender  Skin: no bruising or bleeding  Musculoskeletal: no lower extremity pitting edema present  Neurologic: Mental Status Exam:  Level of Alertness:   awake  Neuropsychiatric: General: normal, calm, and normal eye contact    Medical Decision Making       35 MINUTES SPENT BY ME on the date of service doing chart review, history, exam, documentation & further activities per the note.      Data     I have personally reviewed the following data over the past 24 hrs:    6.4  \   11.7   / 128 (L)     139 103 17.6 /  124 (H)   4.4 27 0.82 \     Trop: N/A BNP: N/A     TSH: N/A T4: N/A A1C: 5.9 (H)       Imaging results reviewed over the past 24 hrs:   Recent Results (from the past 24 hours)   XR Chest Port 1 View    Narrative    EXAM: XR CHEST PORT 1 VIEW  LOCATION: [facility]  DATE: 8/3/2025    INDICATION: 89 y o with afib, hypoxia, check for edema and or atelectasis  COMPARISON: 7/31/2025      Impression    IMPRESSION: Lungs and pleural spaces are clear. Stable enlarged cardiomediastinal silhouette.

## 2025-08-04 NOTE — PROGRESS NOTES
"    [facility]   [address]  [place] [address]   [email or web address]   OFFICE: [phone]     CARDIOLOGY FOLLOW-UP NOTE      Impression and Plan     Impression:   New diagnosis atrial fibrillation with RVR: Rate remains uncontrolled after addition of diltiazem ER to metoprolol succinate. Elevated troponin, no chest pain. Has declined OAC for stroke prevention after discussion with patient and family, patient is a fall risk.   Elevated BNP/troponin: May be related predominately to RVR, LVEF preserved, appropriate IVC collapse - Appears compensated on exam, without angina, inconsistent with ACS or HF  LBBB: LVEF is preserved  HTN: elevated. Losartan and hydrochlorothiazide held for LUCI/loose stools  LUCI: Initial 1.29, resolved    Plan:  Increase diltiazem to 240 mg once daily, additional dose ONCE today  Continue metoprolol succinate 100 mg daily  Patient declined OAS stroke prevention  Assess BP to resume losartan tomorrow  Monitor telemetry and rate control    Primary Cardiologist: She would like to continue care with [facility] cardiology. Can establish with Dr. Browne.    Subjective     Denies SOB, orthopnea, chest pain.     Cardiac Diagnostics   Telemetry (personally reviewed): Borderline rates  this morning, A fib with LBBB      Echocardiogram (results reviewed): 8/1/2024  Left ventricular size, wall motion and function are normal. The ejection  fraction is 60-65%.  Normal right ventricle size and systolic function.  The left atrium is severely dilated. The right atrium is mildly dilated.  No hemodynamically significant valvular abnormalities on 2D or color flow  imaging.  Compared to the prior study, atrial fibrillation is now present.    Physical Examination       BP (!) 151/87 (BP Location: Left arm)   Pulse 110   Temp 98  F (36.7  C) (Oral)   Resp 18   Ht 1.676 m (5' 6\")   Wt 81.9 kg (180 lb 9.6 oz)   SpO2 94%   BMI 29.15 kg/m        " [unfilled]        Intake/Output Summary (Last 24 hours) at 8/4/2025 0833  Last data filed at 8/4/2025 0600  Gross per 24 hour   Intake 830 ml   Output 1400 ml   Net -570 ml       General: pleasant female. No acute distress.   HENT: external ears normal. Nares patent. Mucous membranes moist.  Eyes: perrla, extraocular muscles intact. No scleral icterus.   Neck: No JVD  Lungs: clear to auscultation  COR: irregular rate and rhythm, No murmurs, rubs, or gallops  Extrem: No edema         Imaging          Lab Results   Lab Results   Component Value Date    BUN 17.6 08/04/2025     08/04/2025    CO2 27 08/04/2025       Lab Results   Component Value Date    WBC 6.4 08/04/2025    HGB 11.7 08/04/2025    HCT 34.6 (L) 08/04/2025    MCV 94 08/04/2025     (L) 08/04/2025       Lab Results   Component Value Date    TSH 2.16 07/31/2025    INR 1.17 (H) 08/01/2025               Current Inpatient Scheduled Medications   Scheduled Meds:  Current Facility-Administered Medications   Medication Dose Route Frequency Provider Last Rate Last Admin    acetaminophen (TYLENOL) tablet 975 mg  975 mg Oral TID Halina Mason PA-C   975 mg at 08/04/25 0816    aspirin EC tablet 81 mg  81 mg Oral Daily Tanna Jasso MD   81 mg at 08/04/25 0816    cefTRIAXone (ROCEPHIN) 1 g vial to attach to  mL bag for ADULTS or NS 50 mL bag for PEDS  1 g Intravenous Q24H Tanna Jasso MD   1 g at 08/04/25 0816    colestipol (COLESTID) tablet 1 g  1 g Oral BID Halina Mason PA-C   1 g at 08/03/25 2055    diltiazem ER COATED BEADS (CARDIZEM CD/CARTIA XT) 24 hr capsule 120 mg  120 mg Oral Daily Joanne Browne MD   120 mg at 08/04/25 0816    folic acid (FOLVITE) tablet 1 mg  1 mg Oral Daily Tanna Jasso MD   1 mg at 08/04/25 0817    [Held by provider] losartan (COZAAR) tablet 50 mg  50 mg Oral Tanna Olivares MD   50 mg at 08/02/25 1026    magnesium oxide (MAG-OX) tablet 400 mg  400 mg Oral Q4H Matthieu Anderson MD   400 mg at  08/04/25 0817    metoprolol succinate ER (TOPROL XL) 24 hr tablet 100 mg  100 mg Oral QAM Vaishali James PA-C   100 mg at 08/04/25 0817    phenytoin (DILANTIN) ER capsule 100 mg  100 mg Oral QPM Halina Mason PA-C   100 mg at 08/03/25 2055    pravastatin (PRAVACHOL) tablet 40 mg  40 mg Oral At Bedtime Halina Mason PA-C   40 mg at 08/03/25 2056    sodium chloride (PF) 0.9% PF flush 3 mL  3 mL Intracatheter Q8H L[place]ON Halina Mason PA-C   3 mL at 08/04/25 0639    thiamine (B-1) injection 100 mg  100 mg Intravenous Daily Tanna Jasso MD   100 mg at 08/04/25 0817    traZODone (DESYREL) tablet 100 mg  100 mg Oral At Bedtime Halina Mason PA-C   100 mg at 08/03/25 2056     Continuous Infusions:  Current Facility-Administered Medications   Medication Dose Route Frequency Provider Last Rate Last Admin            Medications Prior to Admission   Prior to Admission medications   Medication Sig Start Date End Date Taking? Authorizing Provider   amLODIPine (NORVASC) 10 MG tablet Take 10 mg by mouth every evening.   Yes Reported, Patient   calcium carbonate (TUMS) 500 MG chewable tablet Take 1 chew tab by mouth as needed for heartburn.   Yes Reported, Patient   Calcium-Magnesium-Vitamin D (CALCIUM 1200+D3 PO) Take 1 tablet by mouth every morning.   Yes Reported, Patient   colestipol (COLESTID) 1 g tablet Take 1 g by mouth 2 times daily.   Yes Reported, Patient   hydrochlorothiazide (HYDRODIURIL) 25 MG tablet Take 25 mg by mouth every morning.   Yes Reported, Patient   losartan (COZAAR) 100 MG tablet Take 100 mg by mouth every morning. 9/13/24  Yes Reported, Patient   metoprolol succinate ER (TOPROL XL) 25 MG 24 hr tablet Take 2 tablets by mouth every morning. 12/10/24  Yes Reported, Patient   phenytoin (DILANTIN) 100 MG ER capsule Take 100 mg by mouth every evening. 12/11/15  Yes Provider, Historical   pravastatin (PRAVACHOL) 40 MG tablet [PRAVASTATIN (PRAVACHOL) 40 MG TABLET] Take 40 mg by mouth bedtime. 12/11/15  Yes  Provider, Historical   traZODone (DESYREL) 50 MG tablet Take 2 tablets by mouth at bedtime. 9/13/24  Yes Reported, Patient          Stephanie Galdamez PA-C

## 2025-08-04 NOTE — PLAN OF CARE
Problem: Dysrhythmia  Goal: Normalized Cardiac Rhythm  Outcome: Progressing     Problem: Gas Exchange Impaired  Goal: Optimal Gas Exchange  Outcome: Progressing   Goal Outcome Evaluation:    Patient remains in A-Fib RVR, later in the shift HR did get just below 100 while napping. Patient requiring 1 LPM NC. Assist x 1 w/GB & FWW. Protocols recheck in AM. MD changed IV antibiotics to PO.

## 2025-08-04 NOTE — PROGRESS NOTES
"Care Management Follow Up    Length of Stay (days): 4    Expected Discharge Date: 08/07/2025    Anticipated Discharge Plan:  Transitional Care    Transportation: Anticipate Family/friend/ if safe to do so.     PT Recommendations: Transitional Care Facility  OT Recommendations:  Transitional Care Facility     Barriers to Discharge: medical stability/ per hosp likely here 3-4 more days .     Prior Living Situation:   \"Pt lives in a townhouse alone. There are \"2 steps in from the garage or 2 steps in from the front of the house. It is a 2 level townhouse. She has a full set of steps to the basement, but rarely uses them. She has everything she needs on the main level\".     She is independent with ADLs and gets help with some IADLs. \"She has housekeeping help every few weeks\". Daughter  sets up her medications and she takes them on her own.  states, \"she is more often forgetting to take some of them daily. It is usually just the AM or just the PM medications.\"      Her daughter  states, \"one of her kids don't check on her physically everyday, but every few days someone is there. One of us often calls her daily.     Per , \"she has had Home Care before and we liked it. It is done now. I don't remember the name of the agency she had back in Nov 2024.\"     Ride: Family\"      Discussed  Partnership in Safe Discharge Planning  document with patient/family: No     Handoff Completed: No, handoff not indicated or clinically appropriate    Patient/Spokesperson Updated: Yes, pt's daughter      Additional Information:  Per hosp pt will likely be in the hospital for 3-4 days for treatment of salmonella and monitoring A-fib.       Two TCU referrals already sent 8/1, pt not currently medically ready at this time. Writer updated facilities via [facility] looking at more med ready end of week possibly.      2:25pm- Pt's daughter  called for an update, and requested a list of ALFs for pt for future and a list of private " duty home care.Writer emailed her both lists to her email [email or web address].         Next Steps: Follow medical readiness, communicate with TCUs, and PAS. CM will continue to monitor progression of care, review team recommendations and provide discharge planning assist as needed.       Mary Alvarado RN  Acute Care Management  [facility]  For further questions/concerns you can reach us at [employer]oc[employer] Web Console

## 2025-08-05 ENCOUNTER — APPOINTMENT (OUTPATIENT)
Dept: PHYSICAL THERAPY | Facility: HOSPITAL | Age: OVER 89
End: 2025-08-05
Payer: COMMERCIAL

## 2025-08-05 LAB
ANION GAP SERPL CALCULATED.3IONS-SCNC: 7 MMOL/L (ref 7–15)
BACTERIA SPEC CULT: NO GROWTH
BUN SERPL-MCNC: 17.1 MG/DL (ref 8–23)
CALCIUM SERPL-MCNC: 8.9 MG/DL (ref 8.8–10.4)
CHLORIDE SERPL-SCNC: 102 MMOL/L (ref 98–107)
CREAT SERPL-MCNC: 0.83 MG/DL (ref 0.51–0.95)
EGFRCR SERPLBLD CKD-EPI 2021: 65 ML/MIN/1.73M2
ERYTHROCYTE [DISTWIDTH] IN BLOOD BY AUTOMATED COUNT: 13.1 % (ref 10–15)
GLUCOSE BLDC GLUCOMTR-MCNC: 149 MG/DL (ref 70–99)
GLUCOSE SERPL-MCNC: 118 MG/DL (ref 70–99)
HCO3 SERPL-SCNC: 30 MMOL/L (ref 22–29)
HCT VFR BLD AUTO: 34.2 % (ref 35–47)
HGB BLD-MCNC: 11.4 G/DL (ref 11.7–15.7)
MAGNESIUM SERPL-MCNC: 1.9 MG/DL (ref 1.7–2.3)
MCH RBC QN AUTO: 31.8 PG (ref 26.5–33)
MCHC RBC AUTO-ENTMCNC: 33.3 G/DL (ref 31.5–36.5)
MCV RBC AUTO: 95 FL (ref 78–100)
PHOSPHATE SERPL-MCNC: 3.8 MG/DL (ref 2.5–4.5)
PLATELET # BLD AUTO: 125 10E3/UL (ref 150–450)
POTASSIUM SERPL-SCNC: 4.6 MMOL/L (ref 3.4–5.3)
RBC # BLD AUTO: 3.59 10E6/UL (ref 3.8–5.2)
SODIUM SERPL-SCNC: 139 MMOL/L (ref 135–145)
WBC # BLD AUTO: 5 10E3/UL (ref 4–11)

## 2025-08-05 PROCEDURE — 250N000013 HC RX MED GY IP 250 OP 250 PS 637

## 2025-08-05 PROCEDURE — 99207 PR APP CREDIT; MD BILLING SHARED VISIT: CPT | Mod: FS

## 2025-08-05 PROCEDURE — 99232 SBSQ HOSP IP/OBS MODERATE 35: CPT | Performed by: INTERNAL MEDICINE

## 2025-08-05 PROCEDURE — 250N000011 HC RX IP 250 OP 636: Performed by: INTERNAL MEDICINE

## 2025-08-05 PROCEDURE — 250N000013 HC RX MED GY IP 250 OP 250 PS 637: Performed by: INTERNAL MEDICINE

## 2025-08-05 PROCEDURE — 99233 SBSQ HOSP IP/OBS HIGH 50: CPT | Performed by: INTERNAL MEDICINE

## 2025-08-05 PROCEDURE — 120N000004 HC R&B MS OVERFLOW

## 2025-08-05 PROCEDURE — 99232 SBSQ HOSP IP/OBS MODERATE 35: CPT | Mod: FS | Performed by: INTERNAL MEDICINE

## 2025-08-05 PROCEDURE — 85018 HEMOGLOBIN: CPT | Performed by: INTERNAL MEDICINE

## 2025-08-05 PROCEDURE — 97116 GAIT TRAINING THERAPY: CPT | Mod: GP

## 2025-08-05 PROCEDURE — 84100 ASSAY OF PHOSPHORUS: CPT | Performed by: INTERNAL MEDICINE

## 2025-08-05 PROCEDURE — 97110 THERAPEUTIC EXERCISES: CPT | Mod: GP

## 2025-08-05 PROCEDURE — 83735 ASSAY OF MAGNESIUM: CPT | Performed by: INTERNAL MEDICINE

## 2025-08-05 PROCEDURE — 36415 COLL VENOUS BLD VENIPUNCTURE: CPT | Performed by: INTERNAL MEDICINE

## 2025-08-05 PROCEDURE — 80048 BASIC METABOLIC PNL TOTAL CA: CPT | Performed by: INTERNAL MEDICINE

## 2025-08-05 RX ORDER — MAGNESIUM OXIDE 400 MG/1
400 TABLET ORAL EVERY 4 HOURS
Status: COMPLETED | OUTPATIENT
Start: 2025-08-05 | End: 2025-08-05

## 2025-08-05 RX ADMIN — CIPROFLOXACIN 500 MG: 500 TABLET ORAL at 20:59

## 2025-08-05 RX ADMIN — ASPIRIN 81 MG: 81 TABLET, COATED ORAL at 08:57

## 2025-08-05 RX ADMIN — ACETAMINOPHEN 975 MG: 325 TABLET ORAL at 08:58

## 2025-08-05 RX ADMIN — DILTIAZEM HYDROCHLORIDE 240 MG: 120 CAPSULE, EXTENDED RELEASE ORAL at 08:57

## 2025-08-05 RX ADMIN — CIPROFLOXACIN 500 MG: 500 TABLET ORAL at 08:58

## 2025-08-05 RX ADMIN — PRAVASTATIN SODIUM 40 MG: 20 TABLET ORAL at 21:00

## 2025-08-05 RX ADMIN — MAGNESIUM OXIDE TAB 400 MG (241.3 MG ELEMENTAL MG) 400 MG: 400 (241.3 MG) TAB at 10:35

## 2025-08-05 RX ADMIN — MONTELUKAST SODIUM 1 G: 4 TABLET, CHEWABLE ORAL at 10:35

## 2025-08-05 RX ADMIN — TRAZODONE HYDROCHLORIDE 100 MG: 50 TABLET ORAL at 21:00

## 2025-08-05 RX ADMIN — MAGNESIUM OXIDE TAB 400 MG (241.3 MG ELEMENTAL MG) 400 MG: 400 (241.3 MG) TAB at 06:52

## 2025-08-05 RX ADMIN — FOLIC ACID 1 MG: 1 TABLET ORAL at 08:58

## 2025-08-05 RX ADMIN — PHENYTOIN SODIUM 100 MG: 100 CAPSULE, EXTENDED RELEASE ORAL at 20:59

## 2025-08-05 RX ADMIN — THIAMINE HYDROCHLORIDE 100 MG: 100 INJECTION, SOLUTION INTRAMUSCULAR; INTRAVENOUS at 08:58

## 2025-08-05 RX ADMIN — METOPROLOL SUCCINATE 150 MG: 50 TABLET, EXTENDED RELEASE ORAL at 09:08

## 2025-08-05 RX ADMIN — ACETAMINOPHEN 975 MG: 325 TABLET ORAL at 13:59

## 2025-08-05 RX ADMIN — MONTELUKAST SODIUM 1 G: 4 TABLET, CHEWABLE ORAL at 21:00

## 2025-08-05 RX ADMIN — ACETAMINOPHEN 975 MG: 325 TABLET ORAL at 20:59

## 2025-08-05 ASSESSMENT — ACTIVITIES OF DAILY LIVING (ADL)
ADLS_ACUITY_SCORE: 56
ADLS_ACUITY_SCORE: 57
ADLS_ACUITY_SCORE: 56
ADLS_ACUITY_SCORE: 57
ADLS_ACUITY_SCORE: 57
ADLS_ACUITY_SCORE: 56
ADLS_ACUITY_SCORE: 56
ADLS_ACUITY_SCORE: 57
ADLS_ACUITY_SCORE: 56
ADLS_ACUITY_SCORE: 57
ADLS_ACUITY_SCORE: 56
ADLS_ACUITY_SCORE: 57
ADLS_ACUITY_SCORE: 56
ADLS_ACUITY_SCORE: 57
ADLS_ACUITY_SCORE: 56
ADLS_ACUITY_SCORE: 57
ADLS_ACUITY_SCORE: 57

## 2025-08-05 NOTE — PLAN OF CARE
Goal Outcome Evaluation:         Problem: Adult Inpatient Plan of Care  Goal: Absence of Hospital-Acquired Illness or Injury  Intervention: Identify and Manage Fall Risk  Recent Flowsheet Documentation  Taken 8/4/2025 1930 by Matt Yang RN  Safety Promotion/Fall Prevention:   activity supervised   clutter free environment maintained   nonskid shoes/slippers when out of bed   room near nurse's station   safety round/check completed  Taken 8/4/2025 1515 by Matt Yang RN  Safety Promotion/Fall Prevention:   activity supervised   clutter free environment maintained   nonskid shoes/slippers when out of bed   room near nurse's station   safety round/check completed     Problem: Delirium  Goal: Improved Attention and Thought Clarity  Intervention: Maximize Cognitive Function  Recent Flowsheet Documentation  Taken 8/4/2025 1930 by Matt Yang RN  Sensory Stimulation Regulation: care clustered  Taken 8/4/2025 1515 by Matt Yang RN  Sensory Stimulation Regulation: care clustered     Patient is A/Ox4, on 3L NC, and assist x1 with walker. Patient continues to be in afib, occasionally RVR, but largely rate controlled. Patient continues to try to get out of bed without staff, educated on call light use.     K, Mg, and phos protocols, recheck in the am.    Matt Yang RN

## 2025-08-05 NOTE — PLAN OF CARE
Problem: Dysrhythmia  Goal: Normalized Cardiac Rhythm  Outcome: Progressing     Problem: Gas Exchange Impaired  Goal: Optimal Gas Exchange  Outcome: Progressing   Goal Outcome Evaluation:    Patient doing well today. Gets up to the recliner for meals. Abx are PO. Rhythm is still A-Fib occasionally RVR but mostly under 100 BPM. Remains on O2, weaned down from the 3 LPM she was on at start of the shift to 1 LPM. MD order to wean off O2 if possible.

## 2025-08-05 NOTE — PROGRESS NOTES
"Care Management Follow Up    Length of Stay (days): 5    Expected Discharge Date: 08/07/2025    Anticipated Discharge Plan:  Transitional Care    Transportation: Anticipate Family/friend/ if safe to do so.     PT Recommendations: Transitional Care Facility  OT Recommendations:  Transitional Care Facility     Barriers to Discharge: medical stability/ per hosp likely another day or two. CARDS following. ID following.     Prior Living Situation:   \"Pt lives in a townhouse alone. There are \"2 steps in from the garage or 2 steps in from the front of the house. It is a 2 level townhouse. She has a full set of steps to the basement, but rarely uses them. She has everything she needs on the main level\".     She is independent with ADLs and gets help with some IADLs. \"She has housekeeping help every few weeks\". Daughter  sets up her medications and she takes them on her own.  states, \"she is more often forgetting to take some of them daily. It is usually just the AM or just the PM medications.\"      Her daughter  states, \"one of her kids don't check on her physically everyday, but every few days someone is there. One of us often calls her daily.     Per , \"she has had Home Care before and we liked it. It is done now. I don't remember the name of the agency she had back in Nov 2024.\"     Ride: Family\"      Discussed  Partnership in Safe Discharge Planning  document with patient/family: No     Handoff Completed: No, handoff not indicated or clinically appropriate    Patient/Spokesperson Updated: No    Additional Information:  Pt is still being monitored by CARDS and ID.       [facility], they say to reach out to them when pt is more medically ready to see about a bed. [facility] depends on day as well.         Next Steps: Follow medical readiness, communicate with TCUs, and PAS. CM will continue to monitor progression of care, review team recommendations and provide discharge planning " assist as needed.     Mary Alvarado RN  Acute Care Management  [facility]  For further questions/concerns you can reach us at Vocera Web Console

## 2025-08-05 NOTE — PROGRESS NOTES
[facility]    Medicine Progress Note - Hospitalist Service    Date of Admission:  7/31/2025    Assessment & Plan   Chavez Tavares is a 89 year old female with PMHx of HTN, seizure disorder, DLD, PACs, LBBB who was admitted on 7/31/2025. She initially presented to ER for evaluation of back pain and SOB, sent to ER for further evaluation and management of new A fib with RVR , and Salmonella infection     1.Afib with RVR -new  -Elevated Troponin , no cp  -- EKG atrial fib with LBBB (known since 2024)  -- Trop 49 ; 48, 45  -- increased metoprolol to 150 mg -on 8/5/25  --cards added diltiazem and increased to 240 mg on 8/4/25  --stopped amlodipine given softer pressures at admit   -- Cardiac monitoring   -- Cardiology consult   -- Electrolyte replacement protocols   -- CHADS score of at least 4 - I am concerned with thrombocytopenia and falls--cards to eval--and they did discuss anticoagulation with family and they declined   --asa   -did referral for sleep eval(family notes snoring loud and never tested for SATISH)     2.Elevated BNP  -stop ivf  -echo  Left ventricular size, wall motion and function are normal. The ejection  fraction is 60-65%.  Normal right ventricle size and systolic function.  The left atrium is severely dilated. The right atrium is mildly dilated.  No hemodynamically significant valvular abnormalities on 2D or color flow  imaging.  Compared to the prior study, atrial fibrillation is now present.    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--spine did see-- PT and follow up in clinic   -- Pain control with scheduled Tylenol, lido patch, low dose PRN oxycodone   -- PT/OT/CM  -- Fall precautions     6.Shortness of Breath --improving , suspect mostly from RVR  Fatigue   Deconditioning   Endorses SOB  Likely multifactorial in the setting of Afib with RVR,  -- Management of Afib as above  -- CT chest negative for PE   -- Checked respiratory panel -neg  -- Monitor for hypoxia  -- PT/OT/CM      7.Elevated Inflammatory Markers   .3   -- Normal WBC and procal   -- Afebrile   -- Follow-up Bcx   -- Follow-up respiratory panel   --treat uti  -also could be up from gi Salmonella issue   --I suspect this could be up from severe spine changes  as well      8.Hypomagnesemia - replacement protocols   Hypokalemia - replacement protocols      9.Essential HTN   -- HOLD PTA losartan and hydrochlorothiazide given LUCI and hypokalemia   -- Continue  metoprolol higher dose    10.Abnl UA  -concern uti  -ceftriaxone and check UC    11.Acute encephalopathy In pt with cognitive decline , RVR and Salmonella infection  -could be toxic uti, also at risk age, hospital delirium, Salmonella gi infection  -checked ammonia --ok  -head ct neg acute  -neuro did see, had eeg -ok    12.Cirrhosis on scan  -check LFT's and inr, ammonia  -likely causing low plt  -I did speak to family and they were aware and pt still drinks wine--I strongly advise absolutely no alcohol going forward  -started thiamine and folic acid  Watch for withdrawal symptoms    13.Thrombocytopenia--lowest here 81  -likely from above  -trend, now up to 125  -suspect the longer we get away from etoh exposure the better this will be     14.Loose stools--Salmonella infection confirmed   -now per family loose stools pre-dates admit  -pt not sure on time  -had 5-6 loose stools last few days--today just one now  -now stool cx pos Salmonella  -on precautions  -Id  "seeing  -had ceftriaxone 4 days, then ID rec cipro po 10 days   -so far blood cx NTD    15.HLD - continue PTA statin and colestipol     16.History of Seizure Disorder - continue PTA phenytoin   -checked eeg-no sz on eeg here  -neuro did see here     17.Acute hypoxic resp failure  -wean if O2 >90%  -cxr was checked 8/3 neg  -IS  -at night more so, I suspect SATISH-did referral for sleep study      Dispo -once cards has HR under control then TCU    -I called daughter  to update at 1404          Diet: Combination Diet Low Saturated Fat Sodium <2400mg Diet    DVT Prophylaxis: Pneumatic Compression Devices  Harris Catheter: Not present  Lines: None     Cardiac Monitoring: ACTIVE order. Indication: Tachyarrhythmias, acute (48 hours)  Code Status: No CPR- Do NOT Intubate      Clinically Significant Risk Factors               # Hypoalbuminemia: Lowest albumin = 3.2 g/dL at 7/31/2025  2:25 PM, will monitor as appropriate   # Thrombocytopenia: Lowest platelets = 125 in last 2 days, will monitor for bleeding              # Overweight: Estimated body mass index is 28.89 kg/m  as calculated from the following:    Height as of this encounter: 1.676 m (5' 6\").    Weight as of this encounter: 81.2 kg (179 lb).      # Financial/Environmental Concerns: none         Social Drivers of Health    Tobacco Use: Medium Risk (7/31/2025)    Patient History     Smoking Tobacco Use: Former     Smokeless Tobacco Use: Unknown          Disposition Plan     Medically Ready for Discharge: Anticipated in 2-4 Days             aTnna Jasso MD  Hospitalist Service  [facility]  Securely message with [place] (more info)  Text page via [employer] Paging/Directory   ______________________________________________________________________    Interval History   Hr still not controlled, although she seems better and  less symptomatic  No cp  No sob rest  Stools firming up, still soft, but less of them   No abd pain  Appetite just this am " starting to come back     Physical Exam   Vital Signs: Temp: 98.4  F (36.9  C) Temp src: Oral BP: (!) 146/92 Pulse: 102   Resp: 21 SpO2: 92 % O2 Device: Nasal cannula Oxygen Delivery: 1 LPM  Weight: 179 lbs 0 oz    Constitutional: awake, fatigued, alert, cooperative, and no apparent distress  Eyes: sclera clear  Respiratory: no increased work of breathing, good air exchange, no retractions, and clear to auscultation  Cardiovascular: irregularly irregular rhythm and normal S1 and S2--rate still >100 at rest  GI: normal bowel sounds, soft, non-distended, and non-tender  Skin: no bruising or bleeding  Musculoskeletal: no lower extremity pitting edema present  Neurologic: Mental Status Exam:  Level of Alertness:   awake  Neuropsychiatric: General: normal, calm, and normal eye contact    Medical Decision Making       55 MINUTES SPENT BY ME on the date of service doing chart review, history, exam, documentation & further activities per the note.      Data     I have personally reviewed the following data over the past 24 hrs:    5.0  \   11.4 (L)   / 125 (L)     139 102 17.1 /  149 (H)   4.6 30 (H) 0.83 \       Imaging results reviewed over the past 24 hrs:   No results found for this or any previous visit (from the past 24 hours).

## 2025-08-05 NOTE — PLAN OF CARE
Problem: Delirium  Goal: Improved Behavioral Control  Intervention: Prevent and Manage Agitation  Recent Flowsheet Documentation  Taken 8/5/2025 0030 by Galindo Harris, OLE  Environment Familiarity/Consistency: daily routine followed     Problem: Heart Failure  Goal: Fluid and Electrolyte Balance  Outcome: Progressing   Goal Outcome Evaluation:    A/O x4. Denies pain or sob. Tele is Afib RVR. Pt is on O2 with 3L via NC. Have BMx1 overnight. Have good urine output. Able to make needs known. Bed alarm is on for safety. AO1 with activity.     K/Felix/ Mag protocols ran, K and Phos recheck tomorrow AM.    Mag replace x2 doses, first dose given. Recheck tomorrow AM.

## 2025-08-05 NOTE — PROGRESS NOTES
"INFECTIOUS DISEASE FOLLOW UP NOTE  Date: 08/05/2025     ================================================================  SUBJECTIVE  No events, feels fine    ================================================================  OBJECTIVE  BP (!) 146/92 (BP Location: Left arm)   Pulse 102   Temp 98.4  F (36.9  C) (Oral)   Resp 21   Ht 1.676 m (5' 6\")   Wt 81.2 kg (179 lb)   SpO2 92%   BMI 28.89 kg/m      Pertinent labs  CBC RESULTS:   Recent Labs   Lab Test 08/05/25  0441   WBC 5.0   RBC 3.59*   HGB 11.4*   HCT 34.2*   MCV 95   MCH 31.8   MCHC 33.3   RDW 13.1   *        Physical Exam  General: alert, cooperative, no apparent distress  HEENT: atraumatic, normocephalic, no scleral icterus, moist mucus membranes, no cervical lymphadenopathy  Heart: Normal rate, regular rhythm  Lungs: good inspiratory effort  Abdomen: soft, non-tender, non-distended  Extremities: no peripheral edema, no new rashes or lesions    Imaging  7/31 CT head  1.  No CT finding of a mass, hemorrhage or focal area suggestive of acute infarct.  2.  Stable diffuse small vessel ischemic disease and focal encephalomalacia anterior left basal ganglia and caudate head.  3.  Stable minimal volume loss for age.    7/31 CT a/p  1.  Cirrhosis. Stable benign liver cyst inferior right lobe.  2.  Hazy attenuation within the central mesentery consistent with mesenteric congestion.  3.  Few distal colonic diverticula but no acute bowel inflammation or mechanical obstruction.    7/31 CT l spine  THORACIC SPINE CT:  1.  No acute fracture or posttraumatic subluxation.  2.  Unchanged chronic compression fracture deformity of the T7 vertebral body.  3.  No high-grade spinal canal or neural foraminal stenosis.  4.  Multilevel spondylosis.     LUMBAR SPINE CT:  1.  No acute fracture or posttraumatic subluxation.  2.  No high-grade spinal canal or neural foraminal stenosis.  3.  Multilevel spondylosis.  4.  Findings suggestive of Baastrup's disease at L3-L4 " and L4-L5.      Microbiology data  7/31 urine: polymicrobial  7/31 RVP: negative  8/1 blood: NGTD  8/2 stool PCR: Salmonella  8/2 c diff: negative    I have personally reviewed the relevant laboratory, imaging, and microbiology data   ================================================================  ASSESSMENT  Chavez Tavares is a 89 year old female with salmonella gastroenteritis.    Impression  # Salmonella gastroenteritis  # A fib with RVR  # Cirrhosis  # Advanced age    ================================================================  PLAN  - Ciprofloxacin 500mg PO 2x daily for 10 days  - ID will sign off    Jason Yang MD, MPH  [facility]   Available via [employer] secure chat (preferred) or [facility] paging

## 2025-08-06 ENCOUNTER — APPOINTMENT (OUTPATIENT)
Dept: OCCUPATIONAL THERAPY | Facility: HOSPITAL | Age: OVER 89
End: 2025-08-06
Payer: COMMERCIAL

## 2025-08-06 ENCOUNTER — APPOINTMENT (OUTPATIENT)
Dept: PHYSICAL THERAPY | Facility: HOSPITAL | Age: OVER 89
End: 2025-08-06
Payer: COMMERCIAL

## 2025-08-06 LAB
ANION GAP SERPL CALCULATED.3IONS-SCNC: 11 MMOL/L (ref 7–15)
BACTERIA SPEC CULT: NO GROWTH
BUN SERPL-MCNC: 14.8 MG/DL (ref 8–23)
CALCIUM SERPL-MCNC: 8.7 MG/DL (ref 8.8–10.4)
CHLORIDE SERPL-SCNC: 102 MMOL/L (ref 98–107)
CREAT SERPL-MCNC: 0.85 MG/DL (ref 0.51–0.95)
EGFRCR SERPLBLD CKD-EPI 2021: 63 ML/MIN/1.73M2
ERYTHROCYTE [DISTWIDTH] IN BLOOD BY AUTOMATED COUNT: 13.1 % (ref 10–15)
GLUCOSE SERPL-MCNC: 126 MG/DL (ref 70–99)
HCO3 SERPL-SCNC: 25 MMOL/L (ref 22–29)
HCT VFR BLD AUTO: 34.5 % (ref 35–47)
HGB BLD-MCNC: 11.1 G/DL (ref 11.7–15.7)
MAGNESIUM SERPL-MCNC: 1.9 MG/DL (ref 1.7–2.3)
MCH RBC QN AUTO: 31.3 PG (ref 26.5–33)
MCHC RBC AUTO-ENTMCNC: 32.2 G/DL (ref 31.5–36.5)
MCV RBC AUTO: 97 FL (ref 78–100)
PHOSPHATE SERPL-MCNC: 3.7 MG/DL (ref 2.5–4.5)
PLATELET # BLD AUTO: 141 10E3/UL (ref 150–450)
POTASSIUM SERPL-SCNC: 4.7 MMOL/L (ref 3.4–5.3)
RBC # BLD AUTO: 3.55 10E6/UL (ref 3.8–5.2)
SODIUM SERPL-SCNC: 138 MMOL/L (ref 135–145)
WBC # BLD AUTO: 6 10E3/UL (ref 4–11)

## 2025-08-06 PROCEDURE — 97110 THERAPEUTIC EXERCISES: CPT | Mod: GP

## 2025-08-06 PROCEDURE — 99207 PR NO BILLABLE SERVICE THIS VISIT: CPT | Performed by: INTERNAL MEDICINE

## 2025-08-06 PROCEDURE — 250N000013 HC RX MED GY IP 250 OP 250 PS 637

## 2025-08-06 PROCEDURE — 99207 PR NO BILLABLE SERVICE THIS VISIT: CPT

## 2025-08-06 PROCEDURE — 84100 ASSAY OF PHOSPHORUS: CPT | Performed by: INTERNAL MEDICINE

## 2025-08-06 PROCEDURE — 250N000011 HC RX IP 250 OP 636: Performed by: INTERNAL MEDICINE

## 2025-08-06 PROCEDURE — 97116 GAIT TRAINING THERAPY: CPT | Mod: GP

## 2025-08-06 PROCEDURE — 250N000013 HC RX MED GY IP 250 OP 250 PS 637: Performed by: INTERNAL MEDICINE

## 2025-08-06 PROCEDURE — 97535 SELF CARE MNGMENT TRAINING: CPT | Mod: GO

## 2025-08-06 PROCEDURE — 80048 BASIC METABOLIC PNL TOTAL CA: CPT | Performed by: INTERNAL MEDICINE

## 2025-08-06 PROCEDURE — 99233 SBSQ HOSP IP/OBS HIGH 50: CPT | Performed by: INTERNAL MEDICINE

## 2025-08-06 PROCEDURE — 120N000004 HC R&B MS OVERFLOW

## 2025-08-06 PROCEDURE — 85027 COMPLETE CBC AUTOMATED: CPT | Performed by: INTERNAL MEDICINE

## 2025-08-06 PROCEDURE — 36415 COLL VENOUS BLD VENIPUNCTURE: CPT | Performed by: INTERNAL MEDICINE

## 2025-08-06 PROCEDURE — 97530 THERAPEUTIC ACTIVITIES: CPT | Mod: GO

## 2025-08-06 PROCEDURE — 83735 ASSAY OF MAGNESIUM: CPT | Performed by: INTERNAL MEDICINE

## 2025-08-06 RX ORDER — MAGNESIUM OXIDE 400 MG/1
400 TABLET ORAL EVERY 4 HOURS
Status: COMPLETED | OUTPATIENT
Start: 2025-08-06 | End: 2025-08-06

## 2025-08-06 RX ADMIN — MAGNESIUM OXIDE TAB 400 MG (241.3 MG ELEMENTAL MG) 400 MG: 400 (241.3 MG) TAB at 09:59

## 2025-08-06 RX ADMIN — THIAMINE HYDROCHLORIDE 100 MG: 100 INJECTION, SOLUTION INTRAMUSCULAR; INTRAVENOUS at 08:16

## 2025-08-06 RX ADMIN — MONTELUKAST SODIUM 1 G: 4 TABLET, CHEWABLE ORAL at 09:53

## 2025-08-06 RX ADMIN — CIPROFLOXACIN 500 MG: 500 TABLET ORAL at 21:27

## 2025-08-06 RX ADMIN — MONTELUKAST SODIUM 1 G: 4 TABLET, CHEWABLE ORAL at 21:27

## 2025-08-06 RX ADMIN — FOLIC ACID 1 MG: 1 TABLET ORAL at 08:16

## 2025-08-06 RX ADMIN — ACETAMINOPHEN 975 MG: 325 TABLET ORAL at 08:14

## 2025-08-06 RX ADMIN — ASPIRIN 81 MG: 81 TABLET, COATED ORAL at 08:16

## 2025-08-06 RX ADMIN — ACETAMINOPHEN 975 MG: 325 TABLET ORAL at 21:27

## 2025-08-06 RX ADMIN — PRAVASTATIN SODIUM 40 MG: 20 TABLET ORAL at 21:27

## 2025-08-06 RX ADMIN — OXYCODONE HYDROCHLORIDE 2.5 MG: 5 TABLET ORAL at 00:57

## 2025-08-06 RX ADMIN — METOPROLOL SUCCINATE 150 MG: 50 TABLET, EXTENDED RELEASE ORAL at 08:15

## 2025-08-06 RX ADMIN — TRAZODONE HYDROCHLORIDE 100 MG: 50 TABLET ORAL at 21:27

## 2025-08-06 RX ADMIN — MAGNESIUM OXIDE TAB 400 MG (241.3 MG ELEMENTAL MG) 400 MG: 400 (241.3 MG) TAB at 13:10

## 2025-08-06 RX ADMIN — ACETAMINOPHEN 975 MG: 325 TABLET ORAL at 13:09

## 2025-08-06 RX ADMIN — CIPROFLOXACIN 500 MG: 500 TABLET ORAL at 08:15

## 2025-08-06 RX ADMIN — DILTIAZEM HYDROCHLORIDE 240 MG: 120 CAPSULE, EXTENDED RELEASE ORAL at 08:15

## 2025-08-06 RX ADMIN — PHENYTOIN SODIUM 100 MG: 100 CAPSULE, EXTENDED RELEASE ORAL at 21:27

## 2025-08-06 RX ADMIN — OXYCODONE HYDROCHLORIDE 2.5 MG: 5 TABLET ORAL at 09:52

## 2025-08-06 RX ADMIN — LOSARTAN POTASSIUM 50 MG: 50 TABLET, FILM COATED ORAL at 08:16

## 2025-08-06 ASSESSMENT — ACTIVITIES OF DAILY LIVING (ADL)
ADLS_ACUITY_SCORE: 56
ADLS_ACUITY_SCORE: 61
ADLS_ACUITY_SCORE: 61
ADLS_ACUITY_SCORE: 56
ADLS_ACUITY_SCORE: 61
ADLS_ACUITY_SCORE: 56
ADLS_ACUITY_SCORE: 61
ADLS_ACUITY_SCORE: 56
ADLS_ACUITY_SCORE: 61
ADLS_ACUITY_SCORE: 56
ADLS_ACUITY_SCORE: 61
ADLS_ACUITY_SCORE: 56

## 2025-08-06 NOTE — PLAN OF CARE
"  Problem: Adult Inpatient Plan of Care  Goal: Plan of Care Review  Description: The Plan of Care Review/Shift note should be completed every shift.  The Outcome Evaluation is a brief statement about your assessment that the patient is improving, declining, or no change.  This information will be displayed automatically on your shift  note.  Outcome: Progressing  Flowsheets (Taken 8/6/2025 1314)  Plan of Care Reviewed With:   patient   child  Goal: Patient-Specific Goal (Individualized)  Description: You can add care plan individualizations to a care plan. Examples of Individualization might be:  \"Parent requests to be called daily at 9am for status\", \"I have a hard time hearing out of my right ear\", or \"Do not touch me to wake me up as it startles  me\".  Outcome: Progressing  Goal: Absence of Hospital-Acquired Illness or Injury  Outcome: Progressing  Intervention: Identify and Manage Fall Risk  Recent Flowsheet Documentation  Taken 8/6/2025 1214 by Mary Alston RN  Safety Promotion/Fall Prevention:   clutter free environment maintained   nonskid shoes/slippers when out of bed   patient and family education   room door open   room organization consistent   room near nurse's station   safety round/check completed  Taken 8/6/2025 0815 by Mary Alston RN  Safety Promotion/Fall Prevention:   clutter free environment maintained   nonskid shoes/slippers when out of bed   patient and family education   room door open   room organization consistent   room near nurse's station   safety round/check completed  Intervention: Prevent Skin Injury  Recent Flowsheet Documentation  Taken 8/6/2025 1214 by Mary Alston RN  Body Position: position changed independently  Taken 8/6/2025 1127 by Mary Alston RN  Body Position: position changed independently  Taken 8/6/2025 0815 by Mary Alston RN  Body Position: position changed independently  Taken 8/6/2025 0814 by Mary Alston RN  Body Position: position changed " independently  Intervention: Prevent Infection  Recent Flowsheet Documentation  Taken 8/6/2025 1214 by Mary Alston RN  Infection Prevention: hand hygiene promoted  Taken 8/6/2025 0815 by Mary Alston RN  Infection Prevention: hand hygiene promoted  Goal: Optimal Comfort and Wellbeing  Outcome: Progressing  Intervention: Provide Person-Centered Care  Recent Flowsheet Documentation  Taken 8/6/2025 1214 by Mary Alston RN  Trust Relationship/Rapport:   care explained   choices provided   emotional support provided   empathic listening provided   questions answered   questions encouraged   reassurance provided   thoughts/feelings acknowledged  Taken 8/6/2025 0815 by Mary Alston RN  Trust Relationship/Rapport:   care explained   choices provided   emotional support provided   empathic listening provided   questions answered   questions encouraged   reassurance provided   thoughts/feelings acknowledged  Goal: Readiness for Transition of Care  Outcome: Progressing     Problem: Delirium  Goal: Optimal Coping  Outcome: Progressing  Goal: Improved Behavioral Control  Outcome: Progressing  Intervention: Minimize Safety Risk  Recent Flowsheet Documentation  Taken 8/6/2025 1214 by Mary Alston RN  Enhanced Safety Measures: pain management  Trust Relationship/Rapport:   care explained   choices provided   emotional support provided   empathic listening provided   questions answered   questions encouraged   reassurance provided   thoughts/feelings acknowledged  Taken 8/6/2025 0815 by Mary Alston RN  Enhanced Safety Measures: pain management  Trust Relationship/Rapport:   care explained   choices provided   emotional support provided   empathic listening provided   questions answered   questions encouraged   reassurance provided   thoughts/feelings acknowledged  Goal: Improved Attention and Thought Clarity  Outcome: Progressing  Goal: Improved Sleep  Outcome: Progressing     Problem: Dysrhythmia  Goal: Normalized Cardiac  Rhythm  Outcome: Progressing     Problem: Heart Failure  Goal: Optimal Coping  Outcome: Progressing  Goal: Optimal Cardiac Output  Outcome: Progressing  Goal: Stable Heart Rate and Rhythm  Outcome: Progressing  Goal: Fluid and Electrolyte Balance  Outcome: Progressing  Goal: Optimal Functional Ability  Outcome: Progressing  Intervention: Optimize Functional Ability  Recent Flowsheet Documentation  Taken 8/6/2025 1214 by Mary Alston RN  Activity Management:   activity adjusted per tolerance   ambulated to bathroom   back to bed  Taken 8/6/2025 1127 by Mary Alston RN  Activity Management: activity adjusted per tolerance  Taken 8/6/2025 0815 by Mary Alston RN  Activity Management:   activity adjusted per tolerance   ambulated to bathroom   back to bed  Taken 8/6/2025 0814 by Mary Alston RN  Activity Management: activity adjusted per tolerance  Goal: Improved Oral Intake  Outcome: Progressing  Goal: Effective Oxygenation and Ventilation  Outcome: Progressing  Intervention: Promote Airway Secretion Clearance  Recent Flowsheet Documentation  Taken 8/6/2025 1214 by Mary Alston RN  Activity Management:   activity adjusted per tolerance   ambulated to bathroom   back to bed  Taken 8/6/2025 1127 by Mary Alston RN  Activity Management: activity adjusted per tolerance  Taken 8/6/2025 0815 by Mary Alston RN  Activity Management:   activity adjusted per tolerance   ambulated to bathroom   back to bed  Taken 8/6/2025 0814 by Mary Alston RN  Activity Management: activity adjusted per tolerance  Intervention: Optimize Oxygenation and Ventilation  Recent Flowsheet Documentation  Taken 8/6/2025 1214 by Mary Alston RN  Head of Bed (HOB) Positioning: HOB at 30 degrees  Taken 8/6/2025 1127 by Mary Alston RN  Head of Bed (HOB) Positioning: HOB at 20-30 degrees  Taken 8/6/2025 0815 by Mary Alston RN  Head of Bed (HOB) Positioning: HOB at 30 degrees  Taken 8/6/2025 0814 by Mary Alston RN  Head of Bed (HOB)  Positioning: HOB at 30-45 degrees  Goal: Effective Breathing Pattern During Sleep  Outcome: Progressing  Intervention: Monitor and Manage Obstructive Sleep Apnea  Recent Flowsheet Documentation  Taken 8/6/2025 1214 by Mary Alston RN  Medication Review/Management: medications reviewed  Taken 8/6/2025 0815 by Mary Alston RN  Medication Review/Management: medications reviewed     Problem: Fall Injury Risk  Goal: Absence of Fall and Fall-Related Injury  Outcome: Progressing  Intervention: Identify and Manage Contributors  Recent Flowsheet Documentation  Taken 8/6/2025 1214 by Mary Alston RN  Medication Review/Management: medications reviewed  Taken 8/6/2025 0815 by Mary Alston RN  Medication Review/Management: medications reviewed  Intervention: Promote Injury-Free Environment  Recent Flowsheet Documentation  Taken 8/6/2025 1214 by Mary Alston RN  Safety Promotion/Fall Prevention:   clutter free environment maintained   nonskid shoes/slippers when out of bed   patient and family education   room door open   room organization consistent   room near nurse's station   safety round/check completed  Taken 8/6/2025 0815 by Mary Alston RN  Safety Promotion/Fall Prevention:   clutter free environment maintained   nonskid shoes/slippers when out of bed   patient and family education   room door open   room organization consistent   room near nurse's station   safety round/check completed     Problem: Infection  Goal: Absence of Infection Signs and Symptoms  Outcome: Progressing  Intervention: Prevent or Manage Infection  Recent Flowsheet Documentation  Taken 8/6/2025 1214 by Mary Alston RN  Isolation Precautions: enteric precautions maintained  Taken 8/6/2025 0815 by Mary Alston RN  Isolation Precautions: enteric precautions maintained     Problem: Gas Exchange Impaired  Goal: Optimal Gas Exchange  Outcome: Progressing  Intervention: Optimize Oxygenation and Ventilation  Recent Flowsheet Documentation  Taken  8/6/2025 1214 by Mary Alston RN  Head of Bed (HOB) Positioning: HOB at 30 degrees  Taken 8/6/2025 1127 by Mary Alston RN  Head of Bed (HOB) Positioning: HOB at 20-30 degrees  Taken 8/6/2025 0815 by Mary Alston RN  Head of Bed (HOB) Positioning: HOB at 30 degrees  Taken 8/6/2025 0814 by Mary Alston RN  Head of Bed (HOB) Positioning: HOB at 30-45 degrees   Goal Outcome Evaluation:      Plan of Care Reviewed With: patient, child      VSS, NC O2 2L, A-fib with occasional bradycardia, pt asymptomatic. Chronic back pain = scheduled Tylenol and PRN medication, good results. Assist 1 with walker and gait belt. Pt has fair appetite, did not want to order lunch is planning a a bigger dinner - pot roast. Pt son was visiting and plan of care, medications, labs, discharge planning to TCU [facility] tomorrow have been reviewed. Will continue plan of care. Pt has call light and uses it appropriately.

## 2025-08-06 NOTE — PLAN OF CARE
Problem: Adult Inpatient Plan of Care  Goal: Optimal Comfort and Wellbeing  Intervention: Monitor Pain and Promote Comfort  Recent Flowsheet Documentation  Taken 8/6/2025 0057 by Toby Harris RN  Pain Management Interventions:   medication (see MAR)   breathing exercises   care clustered   emotional support   pillow support provided   quiet environment facilitated   relaxation techniques promoted   repositioned   rest     Problem: Heart Failure  Goal: Stable Heart Rate and Rhythm  Outcome: Progressing     Problem: Heart Failure  Goal: Fluid and Electrolyte Balance  Outcome: Progressing   Goal Outcome Evaluation:  Patient reported lower back pain overnight which oxycodone 2.5mg was given with some relief. Upon pain recheck patient sleeping.  Ambulated with assist X 1 with GB and walker to bathroom.  Remains on 3LPM oxymask; patient mouth breather; will desat on RA.  SOB noted with activity.  Remains in Afib, HR 80-90s at rest, up to 100-110s with activity.  No stools overnight.

## 2025-08-06 NOTE — PLAN OF CARE
Problem: Adult Inpatient Plan of Care  Goal: Optimal Comfort and Wellbeing  Outcome: Progressing  Intervention: Provide Person-Centered Care  Recent Flowsheet Documentation  Taken 8/5/2025 2105 by Seun Lewis RN  Trust Relationship/Rapport:   care explained   questions encouraged   questions answered  Taken 8/5/2025 1703 by Seun Lewis RN  Trust Relationship/Rapport:   care explained   questions encouraged   questions answered     Problem: Dysrhythmia  Goal: Normalized Cardiac Rhythm  Outcome: Progressing  Intervention: Monitor and Manage Cardiac Rhythm Effect  Recent Flowsheet Documentation  Taken 8/5/2025 2105 by Seun Lewis, RN  VTE Prevention/Management: SCDs off (sequential compression devices)  Taken 8/5/2025 1703 by Seun Lewis RN  VTE Prevention/Management: SCDs off (sequential compression devices)   Goal Outcome Evaluation:       Pt is alert and oriented x4. Pt's heart rhythm was afib with a prolonged QT/Qtc interval or afib with RVR following ambulation to the restroom. Dyspnea noted upon exertion. Pt reminded to call for help before ambulating. Pt had 1 Lpm O2 via nasal canula for much of the PM shift. O2 titrated to 3 Lpm via oxymask at HS. Pt's magnesium, potassium and phosphorus to be rechecked in the morning. Pt is awaiting TCU placement. No other concerns noted. Enteric precautions maintained.   Seun Lewis RN  8/5/2025  10:51 PM

## 2025-08-06 NOTE — PROGRESS NOTES
[facility]    PROGRESS NOTE - Hospitalist Service    Assessment and Plan  Patient is new to me, Chavez Tavares is a 89 year old female with a past medical history of HTN, seizure disorder, DLD, PACs, LBBB who was admitted on 7/31/2025. She initially presented to ER for evaluation of back pain and SOB, sent to ER for further evaluation and management of new A fib with RVR , and Salmonella infection     Afib with RVR -new  -Elevated Troponin , no cp  -- EKG atrial fib with LBBB (known since 2024)  -- Trop 49 ; 48, 45  -- increased metoprolol to 150 mg -on 8/5/25  --cards added diltiazem and increased to 240 mg on 8/4/25  --stopped amlodipine given softer pressures at admit   -- Cardiac monitoring   -- Cardiology consult   -- Electrolyte replacement protocols   -- CHADS score of at least 4 - I am concerned with thrombocytopenia and falls--cards to eval--and they did discuss anticoagulation with family and they declined   --asa   -did referral for sleep eval(family notes snoring loud and never tested for SATISH)  -Continue to monitor on telemetry     Elevated BNP  -stop ivf  -echo  Left ventricular size, wall motion and function are normal. The ejection  fraction is 60-65%.  Normal right ventricle size and systolic function.  The left atrium is severely dilated. The right atrium is mildly dilated.  No hemodynamically significant valvular abnormalities on 2D or color flow  imaging.  Compared to the prior study, atrial fibrillation is now present.     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--spine did see-- PT and follow up in clinic   -- Pain control with scheduled Tylenol, lido patch, low dose PRN oxycodone   -- PT/OT/CM  -- Fall precautions     hortness of Breath --improving , suspect mostly from RVR  Fatigue   Deconditioning   Endorses SOB  Likely multifactorial in the setting of Afib with RVR,  -- Management of Afib as above  -- CT chest negative for PE   -- Checked respiratory panel -neg  -- Monitor for hypoxia  -- PT/OT/CM      Elevated Inflammatory Markers   .3   -- Normal WBC and procal   -- Afebrile   -- Follow-up Bcx   -- Follow-up respiratory panel   --treat uti  -also could be up from gi Salmonella issue   --I suspect this could be up from severe spine changes  as well      Hypomagnesemia - replacement protocols   Hypokalemia - replacement protocols      Essential HTN   -- HOLD PTA losartan and hydrochlorothiazide given LUCI and hypokalemia   -- Continue  metoprolol higher dose     Abnl UA  -concern uti  -ceftriaxone and check UC     Acute encephalopathy In pt with cognitive decline , RVR and Salmonella infection  -could be toxic uti, also at risk age, hospital delirium, Salmonella gi infection  -checked ammonia --ok  -head ct neg acute  -neuro did see, had eeg -ok     Cirrhosis on scan  -check LFT's and inr, ammonia  -likely causing low plt  -I did speak to family and they were aware and pt still drinks wine--I strongly advise absolutely no alcohol going forward  -started thiamine and folic acid  Watch for withdrawal symptoms     Thrombocytopenia--lowest here 81  -likely from above  -trend, now up to 125  -suspect the longer we get away from etoh exposure the better this will be      Loose stools--Salmonella infection confirmed   -now per family loose stools pre-dates admit  -pt not sure on time  -had 5-6 loose stools last few days--today just one now  -now stool cx pos Salmonella  -on precautions  -Id seeing  -had  ceftriaxone 4 days, then ID rec cipro po 10 days   -so far blood cx NTD     Hyperlipidemia-   - continue PTA statin and colestipol      History of Seizure Disorder - continue PTA phenytoin   -checked eeg-no sz on eeg here  -neuro did see here      Acute hypoxic resp failure  -wean if O2 >90%  -cxr was checked 8/3 neg  -IS  -at night more so, I suspect SATISH-did referral for sleep study  -Wean off oxygen as tolerated    50 MINUTES SPENT BY ME on the date of service doing chart review, history, exam, documentation & further activities per the note    Principal Problem:    Salmonella enteritis  Active Problems:    New onset atrial fibrillation (H)      VTE prophylaxis:  Pneumatic Compression Devices  DIET: Orders Placed This Encounter      Combination Diet Low Saturated Fat Sodium <2400mg Diet      Disposition/Barriers to discharge: Hypoxia, pain control and cardiology clearance.  Medically Ready for Discharge: Anticipated Tomorrow    Code Status: No CPR- Do NOT Intubate    Subjective:  Chavez is feeling much better today, pain is improving.  Denies any shortness of breath.  No fever or chills overnight    PHYSICAL EXAM  Vitals:    08/04/25 0300 08/05/25 0354 08/06/25 0339   Weight: 81.9 kg (180 lb 9.6 oz) 81.2 kg (179 lb) 82.1 kg (181 lb)     B/P:110/58 T:98.1 P:76 R:18     Intake/Output Summary (Last 24 hours) at 8/6/2025 1340  Last data filed at 8/6/2025 1100  Gross per 24 hour   Intake 363 ml   Output 800 ml   Net -437 ml      Body mass index is 29.21 kg/m .    Constitutional: awake, alert, cooperative, no apparent distress, and appears stated age  Respiratory: No increased work of breathing, good air exchange, clear to auscultation bilaterally, no crackles or wheezing  Cardiovascular: Normal apical impulse, regular rate and rhythm, normal S1 and S2, no S3 or S4, and no murmur noted  GI: No scars, normal bowel sounds, soft, non-distended, non-tender, no masses palpated, no hepatosplenomegally  Skin: no bruising or  bleeding and normal skin color, texture, turgor  Musculoskeletal: There is no redness, warmth, or swelling of the joints.  Full range of motion noted.  no lower extremity pitting edema present  Neurologic: Awake, alert, oriented to name, place and time.  Cranial nerves II-XII are grossly intact.  Motor is 5 out of 5 bilaterally.   Sensory is intact.    Neuropsychiatric: Appropriate with examiner      PERTINENT LABS/IMAGING:    I have personally reviewed the following data over the past 24 hrs:    6.0  \   11.1 (L)   / 141 (L)     138 102 14.8 /  126 (H)   4.7 25 0.85 \       Imaging results reviewed over the past 24 hrs:   No results found for this or any previous visit (from the past 24 hours).    Discussed with patient, family, cardiology, nursing staff and discharge planner    Boubacar Waters MD  [facility] Medicine Service  [phone]

## 2025-08-06 NOTE — PROGRESS NOTES
"Care Management Follow Up    Length of Stay (days): 6    Expected Discharge Date: 08/07/2025    Anticipated Discharge Plan:  [facility]     Transportation: Anticipate Wheelchair. Transportation costs discussed? Yes. Discussed with Pt and pt's son Primitivo and pt's daughter . They agreed to [facility] transport and agreeable to the $80.00 cost.     PT Recommendations: Transitional Care Facility  OT Recommendations:  Transitional Care Facility     Barriers to Discharge: medical stability/ CARDS following pt . Per hosp yesterday may be med ready Thurs, and TCUs can accept Thurs.       Prior Living Situation: town home (\"2 steps in from the garage or 2 steps in from the front of the house. 2 level townhouse. She has a full set of steps to the basement, but rarely uses them. She has everything she needs on the main level\".) with alone    Discussed  Partnership in Safe Discharge Planning  document with patient/family: No     Handoff Completed: No, handoff not indicated or clinically appropriate    Patient/Spokesperson Updated: Yes. Who? Pt, pt's son Primitivo, and pt's daughter      Additional Information:    Writer tentatively set pt to go to [facility] on Thurs 8/7 @ 11:00am, via w/c transport from [facility] with O2.       Pt, and pt's kids agreeable to this plan.     PAS completed.       Next Steps: Confirm discharge and medical readiness on Thurs 8/7. CM will continue to monitor progression of care, review team recommendations and provide discharge planning assist as needed.       Mary Alvarado RN  Acute Care Management  [facility]  For further questions/concerns you can reach us at Vocera Web Console    "

## 2025-08-06 NOTE — PROGRESS NOTES
Chart review note. Nursing notes reviewed. Labs reviewed. VSS reviewed. Pt appears to be stable.     Plan:  Rate controlled on metoprolol  mg and diltiazem  mg daily. Has remained asymptomatic.  Patient declined anticoagulation stroke prevention   Will arrange cardiology clinic follow up for ongoing care      No further general cardiology recommendations, will sign off  Please contact me with questions.    Stephanie Galdamez PA-C

## 2025-08-06 NOTE — PROGRESS NOTES
"SPIRITUAL HEALTH SERVICES ([facility])  SPIRITUAL ASSESSMENT Progress Note  [facility]. Unit P4    REFERRAL SOURCE: length of stay      Unable to assess. Chavez declined a spiritual care visit saying \"I'm fine.\"      PLAN: [facility] remain available for support as needed      Elle Ramey, Ph.D., [facility]      Securely Message with [employer] or [employer] Pager.    Non-urgent needs:  Phone  to leave a message  "

## 2025-08-06 NOTE — PLAN OF CARE
"  Problem: Adult Inpatient Plan of Care  Goal: Plan of Care Review  Description: The Plan of Care Review/Shift note should be completed every shift.  The Outcome Evaluation is a brief statement about your assessment that the patient is improving, declining, or no change.  This information will be displayed automatically on your shift  note.  Outcome: Progressing  Goal: Patient-Specific Goal (Individualized)  Description: You can add care plan individualizations to a care plan. Examples of Individualization might be:  \"Parent requests to be called daily at 9am for status\", \"I have a hard time hearing out of my right ear\", or \"Do not touch me to wake me up as it startles  me\".  Outcome: Progressing  Goal: Absence of Hospital-Acquired Illness or Injury  Intervention: Identify and Manage Fall Risk  Recent Flowsheet Documentation  Taken 8/6/2025 1608 by Fabienne Alicea RN  Safety Promotion/Fall Prevention:   clutter free environment maintained   nonskid shoes/slippers when out of bed   patient and family education   room door open   room organization consistent   room near nurse's station   safety round/check completed  Intervention: Prevent Skin Injury  Recent Flowsheet Documentation  Taken 8/6/2025 1608 by Fabienne Alicea RN  Body Position: position changed independently  Intervention: Prevent Infection  Recent Flowsheet Documentation  Taken 8/6/2025 1608 by Fabienne Alicea RN  Infection Prevention: hand hygiene promoted  Goal: Optimal Comfort and Wellbeing  Outcome: Progressing  Intervention: Provide Person-Centered Care  Recent Flowsheet Documentation  Taken 8/6/2025 1608 by Fabienne Alicea RN  Trust Relationship/Rapport:   care explained   choices provided   emotional support provided   empathic listening provided   questions answered   questions encouraged   reassurance provided   thoughts/feelings acknowledged  Goal: Readiness for Transition of Care  Outcome: Progressing   Goal Outcome " Evaluation:  Denied any pain or any discomfort no report of any Loose BM today Monitor reading showed Atrial  Fibrillation with BBB with controlled rate.

## 2025-08-07 ENCOUNTER — DOCUMENTATION ONLY (OUTPATIENT)
Dept: GERIATRICS | Facility: CLINIC | Age: OVER 89
End: 2025-08-07
Payer: COMMERCIAL

## 2025-08-07 VITALS
BODY MASS INDEX: 29.07 KG/M2 | WEIGHT: 180.9 LBS | SYSTOLIC BLOOD PRESSURE: 147 MMHG | TEMPERATURE: 98.8 F | HEIGHT: 66 IN | RESPIRATION RATE: 18 BRPM | DIASTOLIC BLOOD PRESSURE: 107 MMHG | HEART RATE: 100 BPM | OXYGEN SATURATION: 90 %

## 2025-08-07 LAB
MAGNESIUM SERPL-MCNC: 2 MG/DL (ref 1.7–2.3)
PHOSPHATE SERPL-MCNC: 3.9 MG/DL (ref 2.5–4.5)
POTASSIUM SERPL-SCNC: 5.1 MMOL/L (ref 3.4–5.3)

## 2025-08-07 PROCEDURE — 99239 HOSP IP/OBS DSCHRG MGMT >30: CPT | Performed by: INTERNAL MEDICINE

## 2025-08-07 PROCEDURE — 250N000013 HC RX MED GY IP 250 OP 250 PS 637: Performed by: INTERNAL MEDICINE

## 2025-08-07 PROCEDURE — 84132 ASSAY OF SERUM POTASSIUM: CPT | Performed by: INTERNAL MEDICINE

## 2025-08-07 PROCEDURE — 84100 ASSAY OF PHOSPHORUS: CPT | Performed by: INTERNAL MEDICINE

## 2025-08-07 PROCEDURE — 83735 ASSAY OF MAGNESIUM: CPT | Performed by: INTERNAL MEDICINE

## 2025-08-07 PROCEDURE — 36415 COLL VENOUS BLD VENIPUNCTURE: CPT | Performed by: INTERNAL MEDICINE

## 2025-08-07 PROCEDURE — 250N000011 HC RX IP 250 OP 636: Performed by: INTERNAL MEDICINE

## 2025-08-07 PROCEDURE — 250N000013 HC RX MED GY IP 250 OP 250 PS 637

## 2025-08-07 RX ORDER — LANOLIN ALCOHOL/MO/W.PET/CERES
100 CREAM (GRAM) TOPICAL DAILY
Status: SHIPPED
Start: 2025-08-07

## 2025-08-07 RX ORDER — DILTIAZEM HYDROCHLORIDE 240 MG/1
240 CAPSULE, COATED, EXTENDED RELEASE ORAL DAILY
Status: SHIPPED
Start: 2025-08-07 | End: 2025-08-11

## 2025-08-07 RX ORDER — ASPIRIN 81 MG/1
81 TABLET, COATED ORAL DAILY
Status: SHIPPED
Start: 2025-08-07

## 2025-08-07 RX ORDER — OXYCODONE HYDROCHLORIDE 5 MG/1
2.5 TABLET ORAL EVERY 6 HOURS PRN
Qty: 10 TABLET | Refills: 0 | Status: SHIPPED | OUTPATIENT
Start: 2025-08-07 | End: 2025-08-13

## 2025-08-07 RX ORDER — ACETAMINOPHEN 325 MG/1
975 TABLET ORAL 3 TIMES DAILY
Status: SHIPPED
Start: 2025-08-07

## 2025-08-07 RX ORDER — MAGNESIUM SULFATE HEPTAHYDRATE 40 MG/ML
2 INJECTION, SOLUTION INTRAVENOUS ONCE
Status: DISCONTINUED | OUTPATIENT
Start: 2025-08-07 | End: 2025-08-07

## 2025-08-07 RX ORDER — CIPROFLOXACIN 500 MG/1
500 TABLET, FILM COATED ORAL EVERY 12 HOURS
Status: SHIPPED
Start: 2025-08-07 | End: 2025-08-15

## 2025-08-07 RX ORDER — LIDOCAINE 4 G/G
1 PATCH TOPICAL DAILY PRN
Status: SHIPPED
Start: 2025-08-07

## 2025-08-07 RX ORDER — FOLIC ACID 1 MG/1
1 TABLET ORAL DAILY
Status: SHIPPED
Start: 2025-08-07

## 2025-08-07 RX ORDER — MAGNESIUM OXIDE 400 MG/1
400 TABLET ORAL EVERY 4 HOURS
Status: DISCONTINUED | OUTPATIENT
Start: 2025-08-07 | End: 2025-08-07 | Stop reason: HOSPADM

## 2025-08-07 RX ORDER — METOPROLOL SUCCINATE 50 MG/1
150 TABLET, EXTENDED RELEASE ORAL EVERY MORNING
Status: SHIPPED
Start: 2025-08-07 | End: 2025-08-15

## 2025-08-07 RX ORDER — THIAMINE HYDROCHLORIDE 100 MG/ML
100 INJECTION, SOLUTION INTRAMUSCULAR; INTRAVENOUS DAILY
Status: SHIPPED
Start: 2025-08-07 | End: 2025-08-07

## 2025-08-07 RX ADMIN — METOPROLOL SUCCINATE 150 MG: 50 TABLET, EXTENDED RELEASE ORAL at 09:00

## 2025-08-07 RX ADMIN — MAGNESIUM OXIDE TAB 400 MG (241.3 MG ELEMENTAL MG) 400 MG: 400 (241.3 MG) TAB at 09:21

## 2025-08-07 RX ADMIN — THIAMINE HYDROCHLORIDE 100 MG: 100 INJECTION, SOLUTION INTRAMUSCULAR; INTRAVENOUS at 09:01

## 2025-08-07 RX ADMIN — LOSARTAN POTASSIUM 50 MG: 50 TABLET, FILM COATED ORAL at 09:01

## 2025-08-07 RX ADMIN — ASPIRIN 81 MG: 81 TABLET, COATED ORAL at 09:01

## 2025-08-07 RX ADMIN — MONTELUKAST SODIUM 1 G: 4 TABLET, CHEWABLE ORAL at 09:01

## 2025-08-07 RX ADMIN — OXYCODONE HYDROCHLORIDE 2.5 MG: 5 TABLET ORAL at 05:26

## 2025-08-07 RX ADMIN — ACETAMINOPHEN 975 MG: 325 TABLET ORAL at 09:00

## 2025-08-07 RX ADMIN — FOLIC ACID 1 MG: 1 TABLET ORAL at 09:01

## 2025-08-07 RX ADMIN — DILTIAZEM HYDROCHLORIDE 240 MG: 120 CAPSULE, EXTENDED RELEASE ORAL at 08:59

## 2025-08-07 RX ADMIN — CIPROFLOXACIN 500 MG: 500 TABLET ORAL at 09:01

## 2025-08-07 ASSESSMENT — ACTIVITIES OF DAILY LIVING (ADL)
ADLS_ACUITY_SCORE: 56
ADLS_ACUITY_SCORE: 54
ADLS_ACUITY_SCORE: 56
ADLS_ACUITY_SCORE: 54
ADLS_ACUITY_SCORE: 56

## 2025-08-07 NOTE — PLAN OF CARE
Physical Therapy Discharge Summary    Reason for therapy discharge:    Discharged to transitional care facility.    Progress towards therapy goal(s). See goals on Care Plan in [facility] electronic health record for goal details.  Goals not met.  Barriers to achieving goals:   discharge from facility.    Therapy recommendation(s):    Continued therapy is recommended.  Rationale/Recommendations:  PT at TCU.

## 2025-08-07 NOTE — PLAN OF CARE
Assumed care 1900 to 0730. A&O x 4, forgetful. Assist x 1 with a walker. Tele is A.fib w/ BBB. C/O lower back pain, PRN Oxycodone given (see MAR). 2L O2 via nasal cannula. Dyspnea with activity, slow to recover. Up to toilet. Call light within reach, able to make needs known. Bed alarm on for safety.    Problem: Gas Exchange Impaired  Goal: Optimal Gas Exchange  Intervention: Optimize Oxygenation and Ventilation  Recent Flowsheet Documentation  Taken 8/7/2025 0415 by Katt Marley RN  Head of Bed (HOB) Positioning: HOB at 20-30 degrees  Taken 8/7/2025 0015 by Katt Marley RN  Head of Bed (HOB) Positioning: HOB at 20-30 degrees  Taken 8/6/2025 2115 by Katt Marley RN  Head of Bed (HOB) Positioning: HOB at 20-30 degrees     Problem: Heart Failure  Goal: Optimal Functional Ability  Intervention: Optimize Functional Ability  Recent Flowsheet Documentation  Taken 8/7/2025 0415 by Katt Marley RN  Activity Management: activity adjusted per tolerance  Taken 8/7/2025 0015 by Katt Marley RN  Activity Management: activity adjusted per tolerance  Taken 8/6/2025 2115 by Katt Marley RN  Activity Management: activity adjusted per tolerance

## 2025-08-07 NOTE — DISCHARGE SUMMARY
"[facility]  Hospitalist Discharge Summary      Date of Admission:  7/31/2025  Date of Discharge:  8/7/2025  Discharging Provider: Boubacar Waters MD  Discharge Service: Hospitalist Service    Discharge Diagnoses   New onset of A-fib with RVR, resolved  Elevated troponin, ACS is considered and ruled out  Acute kidney injury, improved  Chronic kidney disease stage III  Acute on chronic back pain with T7 compression fracture, improving Hyponatremia and hypochloremia, improved  Hypomagnesemia and hypokalemia, replaced  Hypertension  Urinary tract infection.   Acute toxic encephalopathy, improved  Liver cirrhosis  Thrombocytopenia  Salmonella diarrhea, improved  Hyperlipidemia  History of seizure disorder   Acute respiratory failure with hypoxia, improving  Weakness and deconditioning      Clinically Significant Risk Factors     # Overweight: Estimated body mass index is 29.2 kg/m  as calculated from the following:    Height as of this encounter: 1.676 m (5' 6\").    Weight as of this encounter: 82.1 kg (180 lb 14.4 oz).       Follow-ups Needed After Discharge   Follow-up Appointments       Follow Up and recommended labs and tests      Follow up with Nursing home physician.  No follow up labs or test are needed.  Follow up with specialist, cardiology as scheduled                Unresulted Labs Ordered in the Past 30 Days of this Admission       No orders found from 7/1/2025 to 8/1/2025.        These results will be followed up by PCP    Discharge Disposition   Discharged to nursing home  Condition at discharge: Stable    Hospital Course   89 year old female with a past medical history of HTN, seizure disorder, DLD, PACs, LBBB who was admitted on 7/31/2025. She initially presented to ER for evaluation of back pain and SOB, sent to ER for further evaluation and management of new A fib with RVR , and Salmonella infection.  Please refer to H&P for details.     Afib with RVR -new  -Elevated Troponin , " no cp  -- EKG atrial fib with LBBB (known since 2024)  -- Trop 49 ; 48, 45  -- increased metoprolol to 150 mg -on 8/5/25  --cards added diltiazem and increased to 240 mg on 8/4/25  --stopped amlodipine given softer pressures at admit   -- Cardiac monitoring   -- Cardiology consult   -- Electrolyte replacement protocols   -- CHADS score of at least 4 - I am concerned with thrombocytopenia and falls--cards to eval--and they did discuss anticoagulation with family and they declined   --asa   -did referral for sleep eval(family notes snoring loud and never tested for SATISH)  - Follow-up with cardiology as scheduled     Elevated BNP  -stop ivf  -echo  Left ventricular size, wall motion and function are normal. The ejection  fraction is 60-65%.  Normal right ventricle size and systolic function.  The left atrium is severely dilated. The right atrium is mildly dilated.  No hemodynamically significant valvular abnormalities on 2D or color flow  imaging.  Compared to the prior study, atrial fibrillation is now present.     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--spine did see-- PT and follow up in clinic   -- Pain control with scheduled Tylenol, lido patch, low dose PRN oxycodone   -- PT/OT/CM  -- Fall precautions     hortness of Breath --improving , suspect mostly from RVR  Fatigue   Deconditioning   Endorses SOB  Likely multifactorial in the setting of Afib with  RVR,  -- Management of Afib as above  -- CT chest negative for PE   -- Checked respiratory panel -neg  -- Monitor for hypoxia  -- PT/OT/CM      Elevated Inflammatory Markers   .3   -- Normal WBC and procal   -- Afebrile   -- Follow-up Bcx   -- Follow-up respiratory panel   --treat uti  -also could be up from gi Salmonella issue   --I suspect this could be up from severe spine changes  as well   -Continue to monitor as outpatient     Hypomagnesemia and hypokalemia  - replacement protocols     Essential HTN   -- HOLD PTA losartan and hydrochlorothiazide given LUCI and hypokalemia   -- Continue  metoprolol higher dose  -Diltiazem is added by cardiology.  -Discontinue Norvasc and HCTZ on discharge as recommended by cardiology     UTI with Abnl UA  -concern uti  -ceftriaxone   - Urine culture grew multiple organisms.       Acute encephalopathy In pt with cognitive decline , RVR and Salmonella infection  -could be toxic uti, also at risk age, hospital delirium, Salmonella gi infection  -checked ammonia --ok  -head ct neg acute  -neuro did see, had eeg -ok  -Back to baseline.     Cirrhosis on scan  -likely causing low plt  -I did speak to family and they were aware and pt still drinks wine--I strongly advise absolutely no alcohol going forward  -started thiamine and folic acid  Watch for withdrawal symptoms     Thrombocytopenia--lowest here 81  -likely from above  -trend, now up to 125  -suspect the longer we get away from etoh exposure the better this will be      Loose stools--Salmonella infection confirmed   -now per family loose stools pre-dates admit  -pt not sure on time  -had 5-6 loose stools last few days--today just one now  -now stool cx pos Salmonella  -on precautions  -Id seeing  -had ceftriaxone 4 days, then ID rec cipro po 10 days   -so far blood cx NTD     Hyperlipidemia-   - continue PTA statin and colestipol      History of Seizure Disorder - continue PTA phenytoin   -checked eeg-no sz on eeg  here  -neuro did see here      Acute hypoxic resp failure  -wean if O2 >90%  -cxr was checked 8/3 neg  -IS  -at night more so, I suspect SATISH-did referral for sleep study  -Wean off oxygen as tolerated at TCU    Weakness and deconditioning  - PT/OT evaluation  - Plan for TCU on discharge     Discussed with patient, family, nursing staff and discharge planner.    Consultations This Hospital Stay   CARE MANAGEMENT / SOCIAL WORK IP CONSULT  OCCUPATIONAL THERAPY ADULT IP CONSULT  PHYSICAL THERAPY ADULT IP CONSULT  CARDIOLOGY IP CONSULT  CARE MANAGEMENT / SOCIAL WORK IP CONSULT  PHARMACY LIAISON FOR MEDICATION COVERAGE CONSULT  SPINE SURGERY ADULT IP CONSULT  NEUROLOGY IP CONSULT  INFECTIOUS DISEASES IP CONSULT  PHYSICAL THERAPY ADULT IP CONSULT  OCCUPATIONAL THERAPY ADULT IP CONSULT    Code Status   No CPR- Do NOT Intubate    Time Spent on this Encounter   I, Boubacar Waters MD, personally saw the patient today and spent greater than 30 minutes discharging this patient.       Boubacar Waters MD  [facility]  [address] [place] [id number]  Phone: [phone]  Fax: [phone]  ______________________________________________________________________    Physical Exam   Vital Signs: Temp: 98.8  F (37.1  C) Temp src: Oral BP: (!) 147/107 Pulse: 100   Resp: 18 SpO2: (!) 90 % O2 Device: Nasal cannula Oxygen Delivery: 2 LPM  Weight: 180 lbs 14.4 oz  Constitutional: awake, alert, cooperative, no apparent distress, and appears stated age  Respiratory: No increased work of breathing, good air exchange, clear to auscultation bilaterally, no crackles or wheezing  Cardiovascular: Normal apical impulse, regular rate and rhythm, normal S1 and S2, no S3 or S4, and no murmur noted  GI: No scars, normal bowel sounds, soft, non-distended, non-tender, no masses palpated, no hepatosplenomegally  Skin: no bruising or bleeding and normal skin color, texture, turgor  Musculoskeletal: There is no  redness, warmth, or swelling of the joints.  Full range of motion noted.  no lower extremity pitting edema present  Neurologic: Awake, alert, oriented to name, place and time.  Cranial nerves II-XII are grossly intact.  Motor is 5 out of 5 bilaterally.   Sensory is intact.    Neuropsychiatric: Appropriate with examiner       Primary Care Physician   Silvia Jama    Discharge Orders      Spine  Referral      Adult Sleep Eval & Management  Referral      Follow-Up with Cardiology      General info for SNF    Length of Stay Estimate: Short Term Care: Estimated # of Days <30  Condition at Discharge: Stable  Level of care:skilled   Rehabilitation Potential: Fair  Admission H&P remains valid and up-to-date: Yes  Recent Chemotherapy: N/A  Use Nursing Home Standing Orders: Yes     Mantoux instructions    Give two-step Mantoux (PPD) Per Facility Policy Yes     Follow Up and recommended labs and tests    Follow up with Nursing home physician.  No follow up labs or test are needed.  Follow up with specialist, cardiology as scheduled     Reason for your hospital stay    Afib with RVR , colitis , confusion     Activity - Up with nursing assistance     Activity - Up with assistive device     No CPR- Do NOT Intubate     Physical Therapy Adult Consult    Evaluate and treat as clinically indicated.    Reason:  weakness     Occupational Therapy Adult Consult    Evaluate and treat as clinically indicated.    Reason:  weakness     Oxygen (SNF/TCU) Discharge     Fall precautions     Diet    Follow this diet upon discharge: Current Diet:Orders Placed This Encounter      Combination Diet Low Saturated Fat Sodium <2400mg Diet       Significant Results and Procedures   Most Recent 3 CBC's:  Recent Labs   Lab Test 08/06/25  0446 08/05/25  0441 08/04/25  0448   WBC 6.0 5.0 6.4   HGB 11.1* 11.4* 11.7   MCV 97 95 94   * 125* 128*     Most Recent 3 BMP's:  Recent Labs   Lab Test 08/07/25  0458 08/06/25  0446  08/05/25  1129 08/05/25  0441 08/04/25  0448   NA  --  138  --  139 139   POTASSIUM 5.1 4.7  --  4.6 4.4   CHLORIDE  --  102  --  102 103   CO2  --  25  --  30* 27   BUN  --  14.8  --  17.1 17.6   CR  --  0.85  --  0.83 0.82   ANIONGAP  --  11  --  7 9   SONG  --  8.7*  --  8.9 8.9   GLC  --  126* 149* 118* 124*     Most Recent 2 LFT's:  Recent Labs   Lab Test 08/01/25  0649 07/31/25  1425   AST 19 25   ALT 12 14   ALKPHOS 69 72   BILITOTAL 0.4 0.3   ,   Results for orders placed or performed during the hospital encounter of 07/31/25   CT Thoracic Spine w/o Contrast    Narrative    EXAM: CT THORACIC SPINE W/O CONTRAST, CT LUMBAR SPINE RECONSTRUCTED  LOCATION: [facility]  DATE: 7/31/2025    INDICATION: fall, back pain  COMPARISON: Lumbar spine CT dated 10/23/2024. Chest CT dated 08/08/2024.  TECHNIQUE:  1) Routine CT Thoracic Spine without IV contrast. Multiplanar reformats. Dose reduction techniques were used.   2) Routine CT Lumbar Spine without IV contrast. Multiplanar reformats. Dose reduction techniques were used.     FINDINGS:    THORACIC SPINE CT:  VERTEBRA: Diffuse osteopenia. Unchanged moderate anterior superior endplate height loss of the T7 vertebral body. The remaining vertebral body heights are maintained. Minimal retrolisthesis at T7-T8 is unchanged. Disc height loss, degenerative endplate   changes and anterior osteophytes throughout the thoracic spine. No acute fracture or posttraumatic subluxation.     CANAL/FORAMINA: Small disc osteophyte complexes at multiple levels. No high-grade spinal canal or neural foraminal stenosis.    PARASPINAL: Trace bilateral pleural effusions. Biapical scarring. Bilateral dependent atelectasis. Coronary calcifications. Atherosclerotic calcifications of the aortic arch and great vessels.    LUMBAR SPINE CT:  VERTEBRA: Vertebral body heights are maintained. Mild stepwise retrolisthesis from L1-L4 is unchanged and likely degenerative in nature. No  acute fracture or posttraumatic subluxation.     CANAL/FORAMINA: Disc osteophyte complexes contribute to mild spinal canal narrowing at L2-L3, L3-L4 and L4-L5. Multilevel facet arthropathy, most pronounced at L4-L5 and L5-S1. Mild neural foraminal stenosis at multiple levels.    PARASPINAL: No acute extraspinal abnormality. Degenerative changes of the apposing surfaces of the spinous processes at L3-L4 and L4-L5 is suggestive of Baastrup's disease. Degenerative changes of the sacroiliac joints. Atrophy of the paravertebral   muscles. Atherosclerotic calcifications of the abdominal aorta and branching vessels.      Impression    IMPRESSION:  THORACIC SPINE CT:  1.  No acute fracture or posttraumatic subluxation.  2.  Unchanged chronic compression fracture deformity of the T7 vertebral body.  3.  No high-grade spinal canal or neural foraminal stenosis.  4.  Multilevel spondylosis.    LUMBAR SPINE CT:  1.  No acute fracture or posttraumatic subluxation.  2.  No high-grade spinal canal or neural foraminal stenosis.  3.  Multilevel spondylosis.  4.  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     CT Abdomen Pelvis w/o Contrast    Narrative    EXAM: CT ABDOMEN PELVIS W/O CONTRAST  LOCATION: [facility]  DATE: 7/31/2025    INDICATION: fall, pain, new cirrhosis  COMPARISON: CT angiogram of the chest, earlier today; MRI of the abdomen 3/3/2022  TECHNIQUE: CT scan of the abdomen and pelvis was performed without IV contrast. Multiplanar reformats were obtained. Dose reduction techniques were used.  CONTRAST: None.    FINDINGS:   LOWER CHEST: Moderate coronary calcifications and calcification of the aortic root. Trace pleural fluid.    HEPATOBILIARY: Liver is small with diffuse nodular contour consistent with cirrhosis. There is a circumscribed 2.6 cm cyst along the inferior right liver capsule (series 3, image 37). The gallbladder is distended. No gallbladder wall thickening or    pericholecystic inflammation. No bile duct enlargement.    PANCREAS: Normal.    SPLEEN: Normal.    ADRENAL GLANDS: Normal.    KIDNEYS/BLADDER: Bilateral parapelvic renal cysts are present and do not require imaging workup or follow-up. Excreted contrast is present in nondilated collecting systems and the upper ureters. No nephrolithiasis or hydronephrosis. There is a urine   contrast level in the urinary bladder. No bladder wall thickening or filling defects.    BOWEL: There are a few sigmoid diverticula. No dilated bowel or bowel wall thickening. There is a 13 x 23 mm fat attenuation lesion within the wall of the central small bowel (series 4, image 126) consistent with a benign lipoma. No peritoneal thickening   or ascites.    LYMPH NODES: There is hazy attenuation within the central mesentery suggesting mesenteric congestion. No enlarged lymph nodes in the abdomen or pelvis.    VASCULATURE: Severe atheromatous calcification of the abdominal aorta with lesser involvement of the iliac and common femoral arteries. No aneurysm.    PELVIC ORGANS: Normal.    MUSCULOSKELETAL: Generalized bone demineralization. Small to moderate degenerative osteophytes and related disc space narrowing. Mild lumbar facet arthropathy. No aggressive or destructive bone lesions.      Impression    IMPRESSION:     1.  Cirrhosis. Stable benign liver cyst inferior right lobe.  2.  Hazy attenuation within the central mesentery consistent with mesenteric congestion.  3.  Few distal colonic diverticula but no acute bowel inflammation or mechanical obstruction.     CT Lumbar Spine Reconstructed    Narrative    EXAM: CT THORACIC SPINE W/O CONTRAST, CT LUMBAR SPINE RECONSTRUCTED  LOCATION: [facility]  DATE: 7/31/2025    INDICATION: fall, back pain  COMPARISON: Lumbar spine CT dated 10/23/2024. Chest CT dated 08/08/2024.  TECHNIQUE:  1) Routine CT Thoracic Spine without IV contrast. Multiplanar reformats. Dose reduction  techniques were used.   2) Routine CT Lumbar Spine without IV contrast. Multiplanar reformats. Dose reduction techniques were used.     FINDINGS:    THORACIC SPINE CT:  VERTEBRA: Diffuse osteopenia. Unchanged moderate anterior superior endplate height loss of the T7 vertebral body. The remaining vertebral body heights are maintained. Minimal retrolisthesis at T7-T8 is unchanged. Disc height loss, degenerative endplate   changes and anterior osteophytes throughout the thoracic spine. No acute fracture or posttraumatic subluxation.     CANAL/FORAMINA: Small disc osteophyte complexes at multiple levels. No high-grade spinal canal or neural foraminal stenosis.    PARASPINAL: Trace bilateral pleural effusions. Biapical scarring. Bilateral dependent atelectasis. Coronary calcifications. Atherosclerotic calcifications of the aortic arch and great vessels.    LUMBAR SPINE CT:  VERTEBRA: Vertebral body heights are maintained. Mild stepwise retrolisthesis from L1-L4 is unchanged and likely degenerative in nature. No acute fracture or posttraumatic subluxation.     CANAL/FORAMINA: Disc osteophyte complexes contribute to mild spinal canal narrowing at L2-L3, L3-L4 and L4-L5. Multilevel facet arthropathy, most pronounced at L4-L5 and L5-S1. Mild neural foraminal stenosis at multiple levels.    PARASPINAL: No acute extraspinal abnormality. Degenerative changes of the apposing surfaces of the spinous processes at L3-L4 and L4-L5 is suggestive of Baastrup's disease. Degenerative changes of the sacroiliac joints. Atrophy of the paravertebral   muscles. Atherosclerotic calcifications of the abdominal aorta and branching vessels.      Impression    IMPRESSION:  THORACIC SPINE CT:  1.  No acute fracture or posttraumatic subluxation.  2.  Unchanged chronic compression fracture deformity of the T7 vertebral body.  3.  No high-grade spinal canal or neural foraminal stenosis.  4.  Multilevel spondylosis.    LUMBAR SPINE CT:  1.  No acute  fracture or posttraumatic subluxation.  2.  No high-grade spinal canal or neural foraminal stenosis.  3.  Multilevel spondylosis.  4.  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     CT Head w/o Contrast    Narrative    EXAM: CT HEAD W/O CONTRAST  LOCATION: [facility]  DATE: 2025    INDICATION: Fall at home with head injury  COMPARISON: 2024.  TECHNIQUE: Routine CT Head without IV contrast. Multiplanar reformats. Dose reduction techniques were used.    FINDINGS:  INTRACRANIAL CONTENTS: No intracranial hemorrhage, extraaxial collection, or mass effect.  No CT evidence of acute infarct. There is diffuse small vessel disease ischemia disease. There is stable focal encephalomalacia of the anterior left basal ganglia   and caudate head. The ventricular system, basal cisterns and the cortical sulci are consistent with minimal volume loss for age.     VISUALIZED ORBITS/SINUSES/MASTOIDS: No intraorbital abnormality. No paranasal sinus mucosal disease. No middle ear or mastoid effusion.    BONES/SOFT TISSUES: No acute abnormality.      Impression    IMPRESSION:  1.  No CT finding of a mass, hemorrhage or focal area suggestive of acute infarct.  2.  Stable diffuse small vessel ischemic disease and focal encephalomalacia anterior left basal ganglia and caudate head.  3.  Stable minimal volume loss for age.     XR Chest Port 1 View    Narrative    EXAM: XR CHEST PORT 1 VIEW  LOCATION: [facility]  DATE: 8/3/2025    INDICATION: 89 y o with afib, hypoxia, check for edema and or atelectasis  COMPARISON: 2025      Impression    IMPRESSION: Lungs and pleural spaces are clear. Stable enlarged cardiomediastinal silhouette.   Echocardiogram Complete     Value    LVEF  60-65%    Narrative    [id number]  [id number]  [id number]  [id number]^EV^     [place] [address]     Name: DEYSI MENEZES  MRN: [id number]  :  04/13/1931  Study Date: 08/01/2025 01:00 PM  Age: 89 yrs  Gender: Female  Patient Location: [facility]  Reason For Study: Atrial Fibrillation  Ordering Physician: EV HARMAN  Performed By: CEDRIC^^^^     BSA: 1.9 m2  Height: 66 in  Weight: 181 lb  HR: 99  BP: 139/82 mmHg  ______________________________________________________________________________  Procedure  Echocardiogram with two-dimensional, color and spectral Doppler. Definity (NDC  #[id number]) given intravenously. Adequate quality two-dimensional was  performed and interpreted. No hemodynamically significant valvular  abnormalities on 2D or color flow imaging.  ______________________________________________________________________________  Interpretation Summary     Left ventricular size, wall motion and function are normal. The ejection  fraction is 60-65%.  Normal right ventricle size and systolic function.  The left atrium is severely dilated. The right atrium is mildly dilated.  No hemodynamically significant valvular abnormalities on 2D or color flow  imaging.  Compared to the prior study, atrial fibrillation is now present.  ______________________________________________________________________________  Left Ventricle  Left ventricular size, wall motion and function are normal. The ejection  fraction is 60-65%. Diastolic function not assessed due to atrial  fibrillation.     Right Ventricle  Normal right ventricle size and systolic function.     Atria  The left atrium is severely dilated. The right atrium is mildly dilated.     Mitral Valve  Mitral valve leaflets appear normal. There is no evidence of mitral stenosis  or clinically significant mitral regurgitation.     Tricuspid Valve  Tricuspid valve leaflets appear normal. There is no evidence of tricuspid  stenosis or clinically significant tricuspid regurgitation. Right ventricle  systolic pressure estimate normal. The right ventricular systolic pressure is  approximated at 31.6 mmHg plus the right atrial  pressure.     Aortic Valve  The aortic valve is trileaflet. Aortic valve leaflets appear normal. There is  no evidence of aortic stenosis or clinically significant aortic regurgitation.     Pulmonic Valve  The pulmonic valve is not well seen, but is grossly normal. This degree of  valvular regurgitation is within normal limits. There is trace pulmonic  valvular regurgitation.     Vessels  The aorta root is normal. Normal size ascending aorta. IVC diameter <2.1 cm  collapsing >50% with sniff suggests a normal RA pressure of 3 mmHg.     Pericardium  There is no pericardial effusion.     Rhythm  The rhythm was atrial fibrillation.  ______________________________________________________________________________  MMode/2D Measurements & Calculations     Ao root diam: 3.0 cm  LVOT diam: 1.8 cm  LVOT area: 2.5 cm2  Ao root diam index Ht(cm/m): 1.8  Ao root diam index BSA (cm/m2): 1.6  LA Volume (BP): 114.0 ml  LA Volume Index (BP): 59.4 ml/m2     LA Volume Indexed (AL/bp): 64.3 ml/m2  RV Base: 2.6 cm  TAPSE: 1.3 cm     Time Measurements  MM HR: 99.0 BPM     Doppler Measurements & Calculations  MV E max jorge: 130.6 cm/sec  MV max P.5 mmHg  MV mean P.0 mmHg  MV V2 VTI: 24.4 cm  MVA(VTI): 2.4 cm2  MV dec time: 0.17 sec  Ao V2 max: 154.0 cm/sec  Ao max P.0 mmHg  Ao V2 mean: 105.0 cm/sec  Ao mean P.0 mmHg  Ao V2 VTI: 26.2 cm  GENEVA(I,D): 2.3 cm2  GENEVA(V,D): 2.4 cm2  LV V1 max P.4 mmHg  LV V1 max: 145.0 cm/sec  LV V1 VTI: 23.3 cm     SV(LVOT): 59.3 ml  SI(LVOT): 30.9 ml/m2  PA V2 max: 88.3 cm/sec  PA max PG: 3.1 mmHg  PA acc time: 0.04 sec  TR max jorge: 281.0 cm/sec  TR max P.6 mmHg  AV Jorge Ratio (DI): 0.94  GENEVA Index (cm2/m2): 1.2  E/E': 22.9  E/E' av.7  Lateral E/e': 20.3  Medial E/e': 23.1  Peak E' Jorge: 5.7 cm/sec  RV S Jorge: 10.7 cm/sec     ______________________________________________________________________________  Report approved by: Mario Grady MD on 2025 03:20 PM             Discharge  Medications      Review of your medicines        START taking        Dose / Directions   acetaminophen 325 MG tablet  Commonly known as: TYLENOL  Used for: Spondylosis of lumbar region without myelopathy or radiculopathy      Dose: 975 mg  Take 3 tablets (975 mg) by mouth 3 times daily.  Refills: 0     aspirin 81 MG EC tablet  Commonly known as: ASA  Used for: Atrial fibrillation with RVR (H)      Dose: 81 mg  Take 1 tablet (81 mg) by mouth daily.  Refills: 0     ciprofloxacin 500 MG tablet  Commonly known as: CIPRO  Indication: colitis  Used for: Salmonella enteritis      Dose: 500 mg  Take 1 tablet (500 mg) by mouth every 12 hours for 8 days.  Refills: 0     diltiazem ER COATED BEADS 240 MG 24 hr capsule  Commonly known as: CARDIZEM CD/CARTIA XT  Used for: Atrial fibrillation with RVR (H)      Dose: 240 mg  Take 1 capsule (240 mg) by mouth daily.  Refills: 0     folic acid 1 MG tablet  Commonly known as: FOLVITE  Used for: Encephalopathy, unspecified type      Dose: 1 mg  Take 1 tablet (1 mg) by mouth daily.  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 To prevent lidocaine toxicity, patient should be patch free for 12 hrs daily.  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  Quantity: 10 tablet  Refills: 0     thiamine 100 MG tablet  Commonly known as: B-1  Used for: Encephalopathy, unspecified type      Dose: 100 mg  Take 1 tablet (100 mg) by mouth daily.  Refills: 0            CHANGE how you take these medications        Dose / Directions   metoprolol succinate ER 50 MG 24 hr tablet  Commonly known as: TOPROL XL  This may have changed:    medication strength  how much to take  Used for: Atrial fibrillation with RVR (H)      Dose: 150 mg  Take 3 tablets (150 mg) by mouth every morning.  Refills: 0            CONTINUE these medicines which have NOT CHANGED        Dose / Directions   colestipol 1 g tablet  Commonly known as: COLESTID      Dose: 1 g  Take 1 g by mouth 2 times daily.  Refills: 0     losartan 100 MG tablet  Commonly known as: COZAAR      Dose: 100 mg  Take 100 mg by mouth every morning.  Refills: 0     phenytoin 100 MG ER capsule  Commonly known as: DILANTIN      Dose: 100 mg  Take 100 mg by mouth every evening.  Refills: 0     pravastatin 40 MG tablet  Commonly known as: PRAVACHOL      Dose: 40 mg  [PRAVASTATIN (PRAVACHOL) 40 MG TABLET] Take 40 mg by mouth bedtime.  Refills: 0     traZODone 50 MG tablet  Commonly known as: DESYREL      Dose: 2 tablet  Take 2 tablets by mouth at bedtime.  Refills: 0            STOP taking      amLODIPine 10 MG tablet  Commonly known as: NORVASC        hydrochlorothiazide 25 MG tablet  Commonly known as: HYDRODIURIL                  Where to get your medicines        Some of these will need a paper prescription and others can be bought over the counter. Ask your nurse if you have questions.    Bring a paper prescription for each of these medications  oxyCODONE 5 MG tablet       Allergies   Allergies   Allergen Reactions    Lisinopril Cough    Simvastatin Muscle Pain (Myalgia)    Carbamazepine Rash

## 2025-08-07 NOTE — PLAN OF CARE
"  Problem: Adult Inpatient Plan of Care  Goal: Plan of Care Review  Description: The Plan of Care Review/Shift note should be completed every shift.  The Outcome Evaluation is a brief statement about your assessment that the patient is improving, declining, or no change.  This information will be displayed automatically on your shift  note.  Outcome: Adequate for Care Transition  Goal: Patient-Specific Goal (Individualized)  Description: You can add care plan individualizations to a care plan. Examples of Individualization might be:  \"Parent requests to be called daily at 9am for status\", \"I have a hard time hearing out of my right ear\", or \"Do not touch me to wake me up as it startles  me\".  Outcome: Adequate for Care Transition  Goal: Absence of Hospital-Acquired Illness or Injury  Outcome: Adequate for Care Transition  Intervention: Identify and Manage Fall Risk  Recent Flowsheet Documentation  Taken 8/7/2025 0915 by Fabienne Alicea RN  Safety Promotion/Fall Prevention:   activity supervised   clutter free environment maintained   mobility aid in reach   nonskid shoes/slippers when out of bed  Intervention: Prevent Skin Injury  Recent Flowsheet Documentation  Taken 8/7/2025 0915 by Fabienne Alicea RN  Body Position: position changed independently  Intervention: Prevent and Manage VTE (Venous Thromboembolism) Risk  Recent Flowsheet Documentation  Taken 8/7/2025 0915 by Fabienne Alicea RN  VTE Prevention/Management: SCDs off (sequential compression devices)  Intervention: Prevent Infection  Recent Flowsheet Documentation  Taken 8/7/2025 0915 by Fabienne Alicea RN  Infection Prevention:   environmental surveillance performed   rest/sleep promoted  Goal: Optimal Comfort and Wellbeing  Outcome: Adequate for Care Transition  Intervention: Provide Person-Centered Care  Recent Flowsheet Documentation  Taken 8/7/2025 0915 by Fabienne Alicea RN  Trust Relationship/Rapport:   care explained   " thoughts/feelings acknowledged  Goal: Readiness for Transition of Care  Outcome: Adequate for Care Transition   Goal Outcome Evaluation:       Will discharge to [facility] and their transport will come  the patient at around 11 AM Family at the bedside denied any pain at this time all questions were answered IV and Telemetry removed.

## 2025-08-07 NOTE — PROGRESS NOTES
Care Management Discharge Note    Discharge Date: 08/07/2025       Discharge Disposition: Transitional Care    Discharge Services: None    Discharge DME: None    Discharge Transportation: health plan transportation ([facility].)    Is transportation arrangement complete? No/ Pt is having [facility] [facility].     Private pay costs discussed: transportation costs/ pt agreeable to having [facility] and agreeable to is costing $80.00.       Does the patient's insurance plan have a 3 day qualifying hospital stay waiver?  No    PAS Confirmation Code: [id number]  Patient/family educated on Medicare website which has current facility and service quality ratings: yes    Education Provided on the Discharge Plan: Yes    Persons Notified of Discharge Plans: Yes, pt, pt's daughter , provider, facility, bedside RN, and HUC/Charge.     Patient/Family in Agreement with the Plan: yes    Handoff Referral Completed: Yes, non-[facility] PCP: External handoff communication completed    Additional Information:    Final discharge plan is for [facility] to [facility] ([address] [place] [address]) today 8/7. [facility] w/c transport with O2 at 1:00pm.         Mary Alvarado RN

## 2025-08-07 NOTE — PLAN OF CARE
Occupational Therapy Discharge Summary    Reason for therapy discharge:    Discharged to transitional care facility.    Progress towards therapy goal(s). See goals on Care Plan in [facility] electronic health record for goal details.  Goals partially met.  Barriers to achieving goals:   discharge from facility.    Therapy recommendation(s):    Continued therapy is recommended.  Rationale/Recommendations:  discharge to TCU.    FERNANDEZ Alcazar, INDIO/L, 8/7/2025, 1:24 PM

## 2025-08-11 ENCOUNTER — TRANSITIONAL CARE UNIT VISIT (OUTPATIENT)
Dept: GERIATRICS | Facility: CLINIC | Age: OVER 89
End: 2025-08-11
Payer: COMMERCIAL

## 2025-08-11 ENCOUNTER — TELEPHONE (OUTPATIENT)
Dept: GERIATRICS | Facility: CLINIC | Age: OVER 89
End: 2025-08-11

## 2025-08-11 ENCOUNTER — LAB REQUISITION (OUTPATIENT)
Dept: LAB | Facility: CLINIC | Age: OVER 89
End: 2025-08-11
Payer: COMMERCIAL

## 2025-08-11 ENCOUNTER — TRANSFERRED RECORDS (OUTPATIENT)
Dept: HEALTH INFORMATION MANAGEMENT | Facility: CLINIC | Age: OVER 89
End: 2025-08-11

## 2025-08-11 VITALS
HEART RATE: 62 BPM | HEIGHT: 66 IN | WEIGHT: 187.8 LBS | BODY MASS INDEX: 30.18 KG/M2 | RESPIRATION RATE: 16 BRPM | SYSTOLIC BLOOD PRESSURE: 176 MMHG | TEMPERATURE: 98.2 F | DIASTOLIC BLOOD PRESSURE: 74 MMHG | OXYGEN SATURATION: 90 %

## 2025-08-11 DIAGNOSIS — A02.0 SALMONELLA ENTERITIS: ICD-10-CM

## 2025-08-11 DIAGNOSIS — D69.6 THROMBOCYTOPENIA, UNSPECIFIED: ICD-10-CM

## 2025-08-11 DIAGNOSIS — K74.60 CIRRHOSIS OF LIVER WITHOUT ASCITES, UNSPECIFIED HEPATIC CIRRHOSIS TYPE (H): ICD-10-CM

## 2025-08-11 DIAGNOSIS — I10 PRIMARY HYPERTENSION: ICD-10-CM

## 2025-08-11 DIAGNOSIS — N39.0 URINARY TRACT INFECTION, SITE NOT SPECIFIED: ICD-10-CM

## 2025-08-11 DIAGNOSIS — J20.9 ACUTE BRONCHITIS, UNSPECIFIED ORGANISM: ICD-10-CM

## 2025-08-11 DIAGNOSIS — I48.91 NEW ONSET ATRIAL FIBRILLATION (H): ICD-10-CM

## 2025-08-11 DIAGNOSIS — J96.01 ACUTE RESPIRATORY FAILURE WITH HYPOXIA (H): Primary | ICD-10-CM

## 2025-08-11 PROCEDURE — 99310 SBSQ NF CARE HIGH MDM 45: CPT | Performed by: NURSE PRACTITIONER

## 2025-08-11 RX ORDER — HYDROCHLOROTHIAZIDE 12.5 MG/1
12.5 TABLET ORAL DAILY
COMMUNITY

## 2025-08-11 RX ORDER — DILTIAZEM HYDROCHLORIDE 180 MG/1
180 CAPSULE, EXTENDED RELEASE ORAL DAILY
COMMUNITY
End: 2025-08-13

## 2025-08-11 RX ORDER — GUAIFENESIN 600 MG/1
1200 TABLET, EXTENDED RELEASE ORAL 2 TIMES DAILY
COMMUNITY

## 2025-08-11 RX ORDER — OXYCODONE HYDROCHLORIDE 5 MG/1
2.5 TABLET ORAL EVERY MORNING
COMMUNITY

## 2025-08-12 LAB
ANION GAP SERPL CALCULATED.3IONS-SCNC: 9 MMOL/L (ref 7–15)
BUN SERPL-MCNC: 16.2 MG/DL (ref 8–23)
C CAYETANENSIS DNA STL QL NAA+NON-PROBE: NEGATIVE
CALCIUM SERPL-MCNC: 8.6 MG/DL (ref 8.8–10.4)
CAMPYLOBACTER DNA SPEC NAA+PROBE: NEGATIVE
CHLORIDE SERPL-SCNC: 104 MMOL/L (ref 98–107)
CREAT SERPL-MCNC: 0.91 MG/DL (ref 0.51–0.95)
CRP SERPL-MCNC: 9.34 MG/L
CRYPTOSP DNA STL QL NAA+NON-PROBE: NEGATIVE
EC STX1+STX2 GENES STL QL NAA+NON-PROBE: NEGATIVE
EGFRCR SERPLBLD CKD-EPI 2021: 58 ML/MIN/1.73M2
ERYTHROCYTE [DISTWIDTH] IN BLOOD BY AUTOMATED COUNT: 13.5 % (ref 10–15)
G LAMBLIA DNA STL QL NAA+NON-PROBE: NEGATIVE
GLUCOSE SERPL-MCNC: 103 MG/DL (ref 70–99)
HCO3 SERPL-SCNC: 24 MMOL/L (ref 22–29)
HCT VFR BLD AUTO: 33.3 % (ref 35–47)
HGB BLD-MCNC: 10.6 G/DL (ref 11.7–15.7)
MCH RBC QN AUTO: 31.5 PG (ref 26.5–33)
MCHC RBC AUTO-ENTMCNC: 31.8 G/DL (ref 31.5–36.5)
MCV RBC AUTO: 99 FL (ref 78–100)
NOROVIRUS GI+II RNA STL QL NAA+NON-PROBE: NEGATIVE
PLATELET # BLD AUTO: 129 10E3/UL (ref 150–450)
POTASSIUM SERPL-SCNC: 4.5 MMOL/L (ref 3.4–5.3)
RBC # BLD AUTO: 3.36 10E6/UL (ref 3.8–5.2)
SALMONELLA SP RPOD STL QL NAA+PROBE: POSITIVE
SHIGELLA SP+EIEC IPAH ST NAA+NON-PROBE: NEGATIVE
SODIUM SERPL-SCNC: 137 MMOL/L (ref 135–145)
VIBRIO DNA SPEC NAA+PROBE: NEGATIVE
WBC # BLD AUTO: 4.1 10E3/UL (ref 4–11)
Y ENTEROCOL DNA STL QL NAA+PROBE: NEGATIVE

## 2025-08-13 ENCOUNTER — TELEPHONE (OUTPATIENT)
Dept: GERIATRICS | Facility: CLINIC | Age: OVER 89
End: 2025-08-13
Payer: COMMERCIAL

## 2025-08-13 ENCOUNTER — LAB REQUISITION (OUTPATIENT)
Dept: LAB | Facility: CLINIC | Age: OVER 89
End: 2025-08-13
Payer: COMMERCIAL

## 2025-08-13 ENCOUNTER — TELEPHONE (OUTPATIENT)
Dept: CARDIOLOGY | Facility: CLINIC | Age: OVER 89
End: 2025-08-13
Payer: COMMERCIAL

## 2025-08-13 DIAGNOSIS — M47.816 SPONDYLOSIS OF LUMBAR REGION WITHOUT MYELOPATHY OR RADICULOPATHY: ICD-10-CM

## 2025-08-13 RX ORDER — OXYCODONE HYDROCHLORIDE 5 MG/1
2.5 TABLET ORAL EVERY 6 HOURS PRN
Qty: 20 TABLET | Refills: 0 | Status: SHIPPED | OUTPATIENT
Start: 2025-08-13

## 2025-08-13 RX ORDER — DILTIAZEM HYDROCHLORIDE 120 MG/1
120 CAPSULE, EXTENDED RELEASE ORAL DAILY
COMMUNITY

## 2025-08-14 ENCOUNTER — TRANSITIONAL CARE UNIT VISIT (OUTPATIENT)
Dept: GERIATRICS | Facility: CLINIC | Age: OVER 89
End: 2025-08-14
Payer: COMMERCIAL

## 2025-08-14 VITALS
HEIGHT: 66 IN | RESPIRATION RATE: 18 BRPM | SYSTOLIC BLOOD PRESSURE: 172 MMHG | HEART RATE: 42 BPM | TEMPERATURE: 98.1 F | OXYGEN SATURATION: 91 % | BODY MASS INDEX: 30.18 KG/M2 | WEIGHT: 187.8 LBS | DIASTOLIC BLOOD PRESSURE: 80 MMHG

## 2025-08-14 DIAGNOSIS — J96.01 ACUTE RESPIRATORY FAILURE WITH HYPOXIA (H): ICD-10-CM

## 2025-08-14 DIAGNOSIS — R00.1 BRADYCARDIA: ICD-10-CM

## 2025-08-14 DIAGNOSIS — I10 HYPERTENSION, UNSPECIFIED TYPE: ICD-10-CM

## 2025-08-14 DIAGNOSIS — I48.91 NEW ONSET ATRIAL FIBRILLATION (H): Primary | ICD-10-CM

## 2025-08-15 ENCOUNTER — LAB REQUISITION (OUTPATIENT)
Dept: LAB | Facility: CLINIC | Age: OVER 89
End: 2025-08-15
Payer: COMMERCIAL

## 2025-08-18 ENCOUNTER — OFFICE VISIT (OUTPATIENT)
Dept: PHYSICAL MEDICINE AND REHAB | Facility: CLINIC | Age: OVER 89
End: 2025-08-18
Attending: NURSE PRACTITIONER
Payer: COMMERCIAL

## 2025-08-18 VITALS
DIASTOLIC BLOOD PRESSURE: 86 MMHG | WEIGHT: 185 LBS | BODY MASS INDEX: 29.73 KG/M2 | SYSTOLIC BLOOD PRESSURE: 152 MMHG | HEIGHT: 66 IN

## 2025-08-18 DIAGNOSIS — M48.26 BAASTRUP DISEASE OF LUMBAR SPINE: ICD-10-CM

## 2025-08-18 DIAGNOSIS — M47.816 SPONDYLOSIS OF LUMBAR REGION WITHOUT MYELOPATHY OR RADICULOPATHY: ICD-10-CM

## 2025-08-18 PROCEDURE — 99204 OFFICE O/P NEW MOD 45 MIN: CPT | Performed by: NURSE PRACTITIONER

## 2025-08-18 PROCEDURE — 1125F AMNT PAIN NOTED PAIN PRSNT: CPT | Performed by: NURSE PRACTITIONER

## 2025-08-18 PROCEDURE — 3079F DIAST BP 80-89 MM HG: CPT | Performed by: NURSE PRACTITIONER

## 2025-08-18 PROCEDURE — 3077F SYST BP >= 140 MM HG: CPT | Performed by: NURSE PRACTITIONER

## 2025-08-18 ASSESSMENT — PAIN SCALES - GENERAL: PAINLEVEL_OUTOF10: MODERATE PAIN (4)

## 2025-08-19 ENCOUNTER — TRANSITIONAL CARE UNIT VISIT (OUTPATIENT)
Dept: GERIATRICS | Facility: CLINIC | Age: OVER 89
End: 2025-08-19
Payer: COMMERCIAL

## 2025-08-19 VITALS
SYSTOLIC BLOOD PRESSURE: 160 MMHG | WEIGHT: 176.6 LBS | TEMPERATURE: 97.9 F | BODY MASS INDEX: 28.38 KG/M2 | OXYGEN SATURATION: 95 % | HEART RATE: 79 BPM | RESPIRATION RATE: 18 BRPM | DIASTOLIC BLOOD PRESSURE: 93 MMHG | HEIGHT: 66 IN

## 2025-08-19 DIAGNOSIS — I48.91 NEW ONSET ATRIAL FIBRILLATION (H): ICD-10-CM

## 2025-08-19 DIAGNOSIS — I10 PRIMARY HYPERTENSION: ICD-10-CM

## 2025-08-19 DIAGNOSIS — E87.79 OTHER HYPERVOLEMIA: ICD-10-CM

## 2025-08-19 DIAGNOSIS — I48.91 ATRIAL FIBRILLATION WITH RVR (H): Primary | ICD-10-CM

## 2025-08-19 DIAGNOSIS — J96.01 ACUTE RESPIRATORY FAILURE WITH HYPOXIA (H): ICD-10-CM

## 2025-08-19 DIAGNOSIS — M47.816 SPONDYLOSIS OF LUMBAR REGION WITHOUT MYELOPATHY OR RADICULOPATHY: ICD-10-CM

## 2025-08-19 PROCEDURE — 99310 SBSQ NF CARE HIGH MDM 45: CPT | Performed by: NURSE PRACTITIONER

## 2025-08-21 ENCOUNTER — HOSPITAL ENCOUNTER (INPATIENT)
Facility: HOSPITAL | Age: OVER 89
LOS: 1 days | Discharge: HOME | End: 2025-08-23
Attending: STUDENT IN AN ORGANIZED HEALTH CARE EDUCATION/TRAINING PROGRAM | Admitting: HOSPITALIST
Payer: COMMERCIAL

## 2025-08-21 ENCOUNTER — LAB REQUISITION (OUTPATIENT)
Dept: LAB | Facility: CLINIC | Age: OVER 89
End: 2025-08-21
Payer: COMMERCIAL

## 2025-08-21 ENCOUNTER — TRANSITIONAL CARE UNIT VISIT (OUTPATIENT)
Dept: GERIATRICS | Facility: CLINIC | Age: OVER 89
End: 2025-08-21
Payer: COMMERCIAL

## 2025-08-21 VITALS
HEART RATE: 91 BPM | TEMPERATURE: 98 F | DIASTOLIC BLOOD PRESSURE: 102 MMHG | SYSTOLIC BLOOD PRESSURE: 168 MMHG | WEIGHT: 176.6 LBS | OXYGEN SATURATION: 95 % | HEIGHT: 66 IN | BODY MASS INDEX: 28.38 KG/M2 | RESPIRATION RATE: 17 BRPM

## 2025-08-21 DIAGNOSIS — I48.91 ATRIAL FIBRILLATION WITH RVR (H): ICD-10-CM

## 2025-08-21 DIAGNOSIS — R06.00 DYSPNEA, UNSPECIFIED TYPE: Primary | ICD-10-CM

## 2025-08-21 DIAGNOSIS — R06.02 SHORTNESS OF BREATH: ICD-10-CM

## 2025-08-21 DIAGNOSIS — I50.31 ACUTE DIASTOLIC CONGESTIVE HEART FAILURE (H): ICD-10-CM

## 2025-08-21 DIAGNOSIS — I10 HYPERTENSION, UNSPECIFIED TYPE: ICD-10-CM

## 2025-08-21 DIAGNOSIS — I10 PRIMARY HYPERTENSION: ICD-10-CM

## 2025-08-21 DIAGNOSIS — I48.91 NEW ONSET ATRIAL FIBRILLATION (H): ICD-10-CM

## 2025-08-21 DIAGNOSIS — D69.6 THROMBOCYTOPENIA, UNSPECIFIED: ICD-10-CM

## 2025-08-21 DIAGNOSIS — K74.60 CIRRHOSIS OF LIVER WITHOUT ASCITES, UNSPECIFIED HEPATIC CIRRHOSIS TYPE (H): ICD-10-CM

## 2025-08-21 DIAGNOSIS — M47.816 SPONDYLOSIS OF LUMBAR REGION WITHOUT MYELOPATHY OR RADICULOPATHY: ICD-10-CM

## 2025-08-21 LAB
ANION GAP SERPL CALCULATED.3IONS-SCNC: 12 MMOL/L (ref 7–15)
BUN SERPL-MCNC: 16.9 MG/DL (ref 8–23)
CALCIUM SERPL-MCNC: 8.7 MG/DL (ref 8.8–10.4)
CHLORIDE SERPL-SCNC: 101 MMOL/L (ref 98–107)
CREAT SERPL-MCNC: 1.01 MG/DL (ref 0.51–0.95)
D DIMER PPP FEU-MCNC: 1.25 UG/ML FEU (ref 0–0.5)
EGFRCR SERPLBLD CKD-EPI 2021: 51 ML/MIN/1.73M2
ERYTHROCYTE [DISTWIDTH] IN BLOOD BY AUTOMATED COUNT: 13.5 % (ref 10–15)
GLUCOSE SERPL-MCNC: 133 MG/DL (ref 70–99)
HCO3 SERPL-SCNC: 23 MMOL/L (ref 22–29)
HCT VFR BLD AUTO: 36.8 % (ref 35–47)
HGB BLD-MCNC: 12.2 G/DL (ref 11.7–15.7)
MCH RBC QN AUTO: 31.4 PG (ref 26.5–33)
MCHC RBC AUTO-ENTMCNC: 33.2 G/DL (ref 31.5–36.5)
MCV RBC AUTO: 94.6 FL (ref 78–100)
NT-PROBNP SERPL-MCNC: 3241 PG/ML (ref 0–624)
PLATELET # BLD AUTO: 112 10E3/UL (ref 150–450)
POTASSIUM SERPL-SCNC: 4.1 MMOL/L (ref 3.4–5.3)
RBC # BLD AUTO: 3.89 10E6/UL (ref 3.8–5.2)
SODIUM SERPL-SCNC: 136 MMOL/L (ref 135–145)
WBC # BLD AUTO: 4.75 10E3/UL (ref 4–11)

## 2025-08-21 ASSESSMENT — ACTIVITIES OF DAILY LIVING (ADL): ADLS_ACUITY_SCORE: 57

## 2025-08-22 ENCOUNTER — APPOINTMENT (OUTPATIENT)
Dept: CT IMAGING | Facility: HOSPITAL | Age: OVER 89
End: 2025-08-22
Attending: STUDENT IN AN ORGANIZED HEALTH CARE EDUCATION/TRAINING PROGRAM
Payer: COMMERCIAL

## 2025-08-22 ENCOUNTER — APPOINTMENT (OUTPATIENT)
Dept: OCCUPATIONAL THERAPY | Facility: HOSPITAL | Age: OVER 89
End: 2025-08-22
Attending: HOSPITALIST
Payer: COMMERCIAL

## 2025-08-22 ENCOUNTER — APPOINTMENT (OUTPATIENT)
Dept: ULTRASOUND IMAGING | Facility: HOSPITAL | Age: OVER 89
End: 2025-08-22
Attending: HOSPITALIST
Payer: COMMERCIAL

## 2025-08-22 ENCOUNTER — APPOINTMENT (OUTPATIENT)
Dept: PHYSICAL THERAPY | Facility: HOSPITAL | Age: OVER 89
End: 2025-08-22
Attending: HOSPITALIST
Payer: COMMERCIAL

## 2025-08-22 PROBLEM — J90 PLEURAL EFFUSION: Status: ACTIVE | Noted: 2025-08-22

## 2025-08-22 PROBLEM — I50.32 CHRONIC HEART FAILURE WITH PRESERVED EJECTION FRACTION (HFPEF) (H): Status: ACTIVE | Noted: 2025-08-22

## 2025-08-22 PROBLEM — R06.02 SOB (SHORTNESS OF BREATH): Status: ACTIVE | Noted: 2025-08-22

## 2025-08-22 PROBLEM — I48.19 PERSISTENT ATRIAL FIBRILLATION (H): Status: ACTIVE | Noted: 2025-07-31

## 2025-08-22 PROCEDURE — 71275 CT ANGIOGRAPHY CHEST: CPT

## 2025-08-22 PROCEDURE — 272N000042 US THORACENTESIS

## 2025-08-22 ASSESSMENT — ACTIVITIES OF DAILY LIVING (ADL)
ADLS_ACUITY_SCORE: 39
ADLS_ACUITY_SCORE: 37
ADLS_ACUITY_SCORE: 39
ADLS_ACUITY_SCORE: 57
ADLS_ACUITY_SCORE: 39
ADLS_ACUITY_SCORE: 57
ADLS_ACUITY_SCORE: 57
ADLS_ACUITY_SCORE: 39

## 2025-08-23 ENCOUNTER — APPOINTMENT (OUTPATIENT)
Dept: OCCUPATIONAL THERAPY | Facility: HOSPITAL | Age: OVER 89
End: 2025-08-23
Attending: HOSPITALIST
Payer: COMMERCIAL

## 2025-08-23 ASSESSMENT — ACTIVITIES OF DAILY LIVING (ADL)
ADLS_ACUITY_SCORE: 39

## 2025-08-26 ENCOUNTER — PATIENT OUTREACH (OUTPATIENT)
Dept: CARE COORDINATION | Facility: CLINIC | Age: OVER 89
End: 2025-08-26
Payer: COMMERCIAL

## (undated) DEVICE — SOL WATER IRRIG 1000ML BOTTLE 2F7114

## (undated) DEVICE — TUBING SUCTION MEDI-VAC 1/4"X20' N620A - HE

## (undated) DEVICE — FORCEP BIOPSY 2.3MM DISP COATED 000388

## (undated) DEVICE — SUCTION MANIFOLD NEPTUNE 2 SYS 1 PORT 702-025-000

## (undated) RX ORDER — LIDOCAINE HYDROCHLORIDE 10 MG/ML
INJECTION, SOLUTION EPIDURAL; INFILTRATION; INTRACAUDAL; PERINEURAL
Status: DISPENSED
Start: 2022-07-07

## (undated) RX ORDER — PROPOFOL 10 MG/ML
INJECTION, EMULSION INTRAVENOUS
Status: DISPENSED
Start: 2022-07-07